# Patient Record
Sex: FEMALE | Race: WHITE | Employment: FULL TIME | URBAN - METROPOLITAN AREA
[De-identification: names, ages, dates, MRNs, and addresses within clinical notes are randomized per-mention and may not be internally consistent; named-entity substitution may affect disease eponyms.]

---

## 2017-02-21 ENCOUNTER — HOSPITAL ENCOUNTER (OUTPATIENT)
Dept: MAMMOGRAPHY | Facility: CLINIC | Age: 47
Discharge: HOME | End: 2017-02-21
Attending: FAMILY MEDICINE | Admitting: FAMILY MEDICINE
Payer: COMMERCIAL

## 2017-02-21 DIAGNOSIS — Z12.31 VISIT FOR SCREENING MAMMOGRAM: ICD-10-CM

## 2017-02-21 PROCEDURE — 77063 BREAST TOMOSYNTHESIS BI: CPT

## 2017-03-10 ENCOUNTER — RESULT FOLLOW UP (OUTPATIENT)
Dept: FAMILY MEDICINE | Facility: CLINIC | Age: 47
End: 2017-03-10

## 2017-03-10 ENCOUNTER — OFFICE VISIT (OUTPATIENT)
Dept: FAMILY MEDICINE | Facility: CLINIC | Age: 47
End: 2017-03-10
Payer: COMMERCIAL

## 2017-03-10 VITALS
TEMPERATURE: 97.8 F | OXYGEN SATURATION: 100 % | BODY MASS INDEX: 22.4 KG/M2 | WEIGHT: 139.4 LBS | DIASTOLIC BLOOD PRESSURE: 80 MMHG | SYSTOLIC BLOOD PRESSURE: 124 MMHG | HEART RATE: 83 BPM | HEIGHT: 66 IN

## 2017-03-10 DIAGNOSIS — Z12.4 SCREENING FOR MALIGNANT NEOPLASM OF CERVIX: Primary | ICD-10-CM

## 2017-03-10 DIAGNOSIS — Z15.89 MTHFR MUTATION: ICD-10-CM

## 2017-03-10 DIAGNOSIS — R87.610 PAPANICOLAOU SMEAR OF CERVIX WITH ATYPICAL SQUAMOUS CELLS OF UNDETERMINED SIGNIFICANCE (ASC-US): ICD-10-CM

## 2017-03-10 DIAGNOSIS — Z13.6 CARDIOVASCULAR SCREENING; LDL GOAL LESS THAN 130: ICD-10-CM

## 2017-03-10 DIAGNOSIS — Z00.00 ENCOUNTER FOR ROUTINE ADULT HEALTH EXAMINATION WITHOUT ABNORMAL FINDINGS: ICD-10-CM

## 2017-03-10 DIAGNOSIS — N39.46 MIXED INCONTINENCE URGE AND STRESS (MALE)(FEMALE): ICD-10-CM

## 2017-03-10 DIAGNOSIS — N81.4 CYSTOCELE WITH UTERINE DESCENSUS: ICD-10-CM

## 2017-03-10 DIAGNOSIS — R87.610 PAP SMEAR WITH ATYPICAL SQUAMOUS CELLS, CANNOT EXCLUDE HIGH GRADE SQUAMOUS INTRAEPITHELIAL LESION (ASC-H): ICD-10-CM

## 2017-03-10 DIAGNOSIS — Z23 NEED FOR PROPHYLACTIC VACCINATION AND INOCULATION AGAINST INFLUENZA: ICD-10-CM

## 2017-03-10 LAB
ERYTHROCYTE [DISTWIDTH] IN BLOOD BY AUTOMATED COUNT: 12.6 % (ref 10–15)
HCT VFR BLD AUTO: 42.6 % (ref 35–47)
HGB BLD-MCNC: 14.2 G/DL (ref 11.7–15.7)
MCH RBC QN AUTO: 29.5 PG (ref 26.5–33)
MCHC RBC AUTO-ENTMCNC: 33.3 G/DL (ref 31.5–36.5)
MCV RBC AUTO: 88 FL (ref 78–100)
PLATELET # BLD AUTO: 294 10E9/L (ref 150–450)
RBC # BLD AUTO: 4.82 10E12/L (ref 3.8–5.2)
WBC # BLD AUTO: 7 10E9/L (ref 4–11)

## 2017-03-10 PROCEDURE — G0145 SCR C/V CYTO,THINLAYER,RESCR: HCPCS | Performed by: FAMILY MEDICINE

## 2017-03-10 PROCEDURE — 84443 ASSAY THYROID STIM HORMONE: CPT | Performed by: FAMILY MEDICINE

## 2017-03-10 PROCEDURE — 80053 COMPREHEN METABOLIC PANEL: CPT | Performed by: FAMILY MEDICINE

## 2017-03-10 PROCEDURE — 87624 HPV HI-RISK TYP POOLED RSLT: CPT | Performed by: FAMILY MEDICINE

## 2017-03-10 PROCEDURE — 85027 COMPLETE CBC AUTOMATED: CPT | Performed by: FAMILY MEDICINE

## 2017-03-10 PROCEDURE — 80061 LIPID PANEL: CPT | Performed by: FAMILY MEDICINE

## 2017-03-10 PROCEDURE — 90471 IMMUNIZATION ADMIN: CPT | Performed by: FAMILY MEDICINE

## 2017-03-10 PROCEDURE — 90686 IIV4 VACC NO PRSV 0.5 ML IM: CPT | Performed by: FAMILY MEDICINE

## 2017-03-10 PROCEDURE — 99396 PREV VISIT EST AGE 40-64: CPT | Mod: 25 | Performed by: FAMILY MEDICINE

## 2017-03-10 PROCEDURE — 36415 COLL VENOUS BLD VENIPUNCTURE: CPT | Performed by: FAMILY MEDICINE

## 2017-03-10 PROCEDURE — G0124 SCREEN C/V THIN LAYER BY MD: HCPCS | Performed by: FAMILY MEDICINE

## 2017-03-10 ASSESSMENT — ANXIETY QUESTIONNAIRES
2. NOT BEING ABLE TO STOP OR CONTROL WORRYING: SEVERAL DAYS
6. BECOMING EASILY ANNOYED OR IRRITABLE: NOT AT ALL
3. WORRYING TOO MUCH ABOUT DIFFERENT THINGS: SEVERAL DAYS
GAD7 TOTAL SCORE: 7
7. FEELING AFRAID AS IF SOMETHING AWFUL MIGHT HAPPEN: SEVERAL DAYS
5. BEING SO RESTLESS THAT IT IS HARD TO SIT STILL: SEVERAL DAYS
IF YOU CHECKED OFF ANY PROBLEMS ON THIS QUESTIONNAIRE, HOW DIFFICULT HAVE THESE PROBLEMS MADE IT FOR YOU TO DO YOUR WORK, TAKE CARE OF THINGS AT HOME, OR GET ALONG WITH OTHER PEOPLE: NOT DIFFICULT AT ALL
1. FEELING NERVOUS, ANXIOUS, OR ON EDGE: MORE THAN HALF THE DAYS

## 2017-03-10 ASSESSMENT — PATIENT HEALTH QUESTIONNAIRE - PHQ9: 5. POOR APPETITE OR OVEREATING: SEVERAL DAYS

## 2017-03-10 NOTE — MR AVS SNAPSHOT
"              After Visit Summary   3/10/2017    Sherry Osorio    MRN: [id number]           Patient Information     Date Of Birth          1970        Visit Information        Provider Department      3/10/2017 4:15 PM Selena Barnes MD [facility] [place]        Today's Diagnoses     Screening for malignant neoplasm of cervix    -  1    Encounter for routine adult health examination without abnormal findings        CARDIOVASCULAR SCREENING; LDL GOAL LESS THAN 130        Papanicolaou smear of cervix with atypical squamous cells of undetermined significance (ASC-US)        Cystocele with uterine descensus- mild w/ both         MTHFR mutation - heterozygote        Need for prophylactic vaccination and inoculation against influenza        Mixed incontinence urge and stress (male)(female)          Care Instructions    STOP TANNING!!!  Discussed effects of UV rays on immune system, wrinkling, skin damage, skin cancer, etc.    Patient Instructions    Tips for avoiding skin cancer and premature skin aging:    Wear sunscreen on face every day even in winter. UVA penetrates window glass and is just as strong in the winter as it is in the summer. Sunscreen with SPF > 30 is recommended.    Avoid mid-day sunshine (10 AM to 3 PM) if possible.    Never use a tanning bed. They are associated with much higher risks of skin cancer. There is no merit in getting \"a base tan\" before a warm weather vacation. All tanning damages the skin.    Wear a wide brimmed hat and sunglasses.    Wear  sun protective clothing such as [employer] ( local company out of [facility]- they guarantee their UPF 50+sun protection for the life of the garment -  or sunprecaCelebrations.com ( [employer]) that is stylish, comfortable and cool. Try their web site for sales.    If you smoke, stop. Smokers have higher rates of skin cancer and also have premature wrinkling.    Note that spray sunscreens are only for re-application, not " for initial applications. Caution around kids who may breathe in the chemicals.    At the beach, it is recommended to use 2 ounces (one shot glass) of sunscreen and to reapply every 2 hours. This means an 8 ounce bottle or tube of sunscreen contains four applications.    Any tan indicates sun damage. Aim for ivory skin year round and you will have less trouble with your skin in years to come.      Chemical free sunscreens (better for sensitive skin) include  -Vanicream SPF 30 and 50+  - EltaMD tinted mineral sunscreen, SPF 41 (for face)  - Blue Lizard  -Neutrogena Pure and Free       Preventive Health Recommendations  Female Ages 40 to 49    Yearly exam:     See your health care provider every year in order to  1. Review health changes.   2. Discuss preventive care.    3. Review your medicines if your doctor prescribed any.      Get a Pap test every three years (unless you have an abnormal result and your provider advises testing more often).      If you get Pap tests with HPV test, you only need to test every 5 years, unless you have an abnormal result. You do not need a Pap test if your uterus was removed (hysterectomy) and you have not had cancer.      You should be tested each year for STDs (sexually transmitted diseases), if you're at risk.       Ask your doctor if you should have a mammogram.      Have a colonoscopy (test for colon cancer) if someone in your family has had colon cancer or polyps before age 50.       Have a cholesterol test every 5 years.       Have a diabetes test (fasting glucose) after age 45. If you are at risk for diabetes, you should have this test every 3 years.    Shots: Get a flu shot each year. Get a tetanus shot every 10 years.     Nutrition:     Eat at least 5 servings of fruits and vegetables each day.    Eat whole-grain bread, whole-wheat pasta and brown rice instead of white grains and rice.    Talk to your provider about Calcium and Vitamin D.     Lifestyle    Exercise at least  150 minutes a week (an average of 30 minutes a day, 5 days a week). This will help you control your weight and prevent disease.    Limit alcohol to one drink per day.    No smoking.     Wear sunscreen to prevent skin cancer.    See your dentist every six months for an exam and cleaning.            Follow-ups after your visit        Additional Services     UROLOGY ADULT REFERRAL       Your provider has referred you to: [facility]: [facility] [phone]   [email or web address]    Please be aware that coverage of these services is subject to the terms and limitations of your health insurance plan.  Call member services at your health plan with any benefit or coverage questions.      Please bring the following with you to your appointment:    (1) Any X-Rays, CTs or MRIs which have been performed.  Contact the facility where they were done to arrange for  prior to your scheduled appointment.  Any new CT, MRI or other procedures ordered by your specialist must be performed at a [facility] facility or coordinated by your clinic's referral office.  (2) List of current medications  (3) This referral request   (4) Any documents/labs given to you for this referral                  Who to contact     If you have questions or need follow up information about today's clinic visit or your schedule please contact [facility] [place] directly at [phone].  Normal or non-critical lab and imaging results will be communicated to you by MyChart, letter or phone within 4 business days after the clinic has received the results. If you do not hear from us within 7 days, please contact the clinic through MyChart or phone. If you have a critical or abnormal lab result, we will notify you by phone as soon as possible.  Submit refill requests through [facility] or call your pharmacy and they will forward the refill request to us. Please allow 3 business days for your  "refill to be completed.          Additional Information About Your Visit        [email or web address] Information     [employer] gives you secure access to your electronic health record. If you see a primary care provider, you can also send messages to your care team and make appointments. If you have questions, please call your primary care clinic.  If you do not have a primary care provider, please call [phone] and they will assist you.        Care EveryWhere ID     This is your Care EveryWhere ID. This could be used by other organizations to access your [facility] medical records  [id number]        Your Vitals Were     Pulse Temperature Height Last Period Pulse Oximetry BMI (Body Mass Index)    83 97.8  F (36.6  C) (Oral) 5' 5.5\" (1.664 m) 04/01/2015 100% 22.84 kg/m2       Blood Pressure from Last 3 Encounters:   03/10/17 124/80   02/11/16 121/84   01/25/16 121/70    Weight from Last 3 Encounters:   03/10/17 139 lb 6.4 oz (63.2 kg)   02/11/16 159 lb (72.1 kg)   01/25/16 159 lb 12.8 oz (72.5 kg)              We Performed the Following     CBC with platelets     Comprehensive metabolic panel     FLU VAC, SPLIT VIRUS IM > 3 YO (QUADRIVALENT) [00623]     HPV High Risk Types DNA Cervical     Lipid panel reflex to direct LDL     Pap imaged thin layer screen with HPV - recommended age 30 - 65 years (select HPV order below)     TSH with free T4 reflex     UROLOGY ADULT REFERRAL     Vaccine Administration, Initial [[id number]]        Primary Care Provider Office Phone # Fax #    Selena Barnes [phone]       [address] [address]        Thank you!     Thank you for choosing [facility]  for your care. Our goal is always to provide you with excellent care. Hearing back from our patients is one way we can continue to improve our services. Please take a few minutes to complete the written survey that you may receive in the mail after your visit with " us. Thank you!             Your Updated Medication List - Protect others around you: Learn how to safely use, store and throw away your medicines at www.disposemymeds.org.          This list is accurate as of: 3/10/17  5:58 PM.  Always use your most recent med list.                   Brand Name Dispense Instructions for use    aspirin 81 MG tablet     30 tablet    Take 1 tablet (81 mg) by mouth daily       triamcinolone 0.1 % cream    KENALOG    80 g    Apply topically 3 times daily as needed Apply sparingly to affected area.

## 2017-03-10 NOTE — NURSING NOTE
"Chief Complaint   Patient presents with     Physical       Initial /80 (BP Location: Right arm, Patient Position: Chair, Cuff Size: Adult Regular)  Pulse 83  Temp 97.8  F (36.6  C) (Oral)  Ht 5' 5.5\" (1.664 m)  Wt 139 lb 6.4 oz (63.2 kg)  LMP 04/01/2015  SpO2 100%  BMI 22.84 kg/m2 Estimated body mass index is 22.84 kg/(m^2) as calculated from the following:    Height as of this encounter: 5' 5.5\" (1.664 m).    Weight as of this encounter: 139 lb 6.4 oz (63.2 kg).  Medication Reconciliation: complete   Adriane Rivera CMA  "

## 2017-03-10 NOTE — LETTER
November 28, 2018      Sherry [address]in[address]a  [address] [place] [address]    Dear ,      At [facility], your health and wellness is our primary concern. That is why we are following up on a colposcopy from 12/13/17, which was reported as NEO 1. Your provider had recommended that you have a Pap smear and HPV test completed by 12/13/18. Our records do not show that this has been scheduled.    It is important to complete the follow up that your provider has suggested for you to ensure that there are no worsening changes which may, over time, develop into cancer.      Please contact our office at  [phone] to schedule an appointment for a Pap smear and HPV test at your earliest convenience. If you have questions or concerns, please call the clinic and we will be happy to assist you.    If you have completed the tests outside of [facility], please have the results forwarded to our office. We will update the chart for your primary Physician to review before your next annual physical.     Thank you for choosing [facility]!    Sincerely,      Selena Barnes MD/[facility]

## 2017-03-10 NOTE — LETTER
October 5, 2017      Sherry [address]iz [address]  [address] [place] [address]    Dear ,      At [facility], your health and wellness is our primary concern. That is why we are following up on a colposcopy from 4/21/17, which was reported as NEO 1. Your provider had recommended that you have a Pap smear completed by 10/21/17. Our records do not show that this has been done.      It is important to complete the follow up that your provider has suggested for you to ensure that there are no worsening changes which may, over time, develop into cancer.      Please contact our office at  [phone] to schedule an appointment for a Pap smear at your earliest convenience. If you have questions or concerns, please call the clinic and we will be happy to assist you.    If you have completed the tests outside of [facility], please have the results forwarded to our office. We will update the chart for your primary Physician to review before your next annual physical.     Thank you for choosing [facility]!    Sincerely,      Selena Barnes MD/mj

## 2017-03-10 NOTE — PROGRESS NOTES
SUBJECTIVE:     CC: Sherry Osorio is an 46 year old woman who presents for preventive health visit.     Healthy Habits:    Do you get at least three servings of calcium containing foods daily (dairy, green leafy vegetables, etc.)? yes    Amount of exercise or daily activities, outside of work: 2-3 day(s) per week, 45-60 minutes    Problems taking medications regularly No    Medication side effects: No    Have you had an eye exam in the past two years? yes    Do you see a dentist twice per year? yes    Do you have sleep apnea, excessive snoring or daytime drowsiness?no            Today's PHQ-2 Score:   PHQ-2 ( 1999 Pfizer) 3/10/2017 1/24/2016   Q1: Little interest or pleasure in doing things 1 -   Q2: Feeling down, depressed or hopeless 1 -   PHQ-2 Score 2 -   Little interest or pleasure in doing things Several days Not at all   Feeling down, depressed or hopeless Several days Not at all   PHQ-2 Score 2 0       Abuse: Current or Past(Physical, Sexual or Emotional)- No  Do you feel safe in your environment - Yes    Social History   Substance Use Topics     Smoking status: Never Smoker     Smokeless tobacco: Never Used     Alcohol use 0.0 - 1.0 oz/week      Comment: maybe 0-1 weekly     The patient does not drink >3 drinks per day nor >7 drinks per week.    Recent Labs   Lab Test  01/25/16   1508  12/22/14   0953  12/18/13   1137   CHOL  192  193  200   HDL  65  63  60   LDL  117*  122  128   TRIG  51  41  61   CHOLHDLRATIO   --   3.1  3.3   NHDL  127   --    --        Reviewed orders with patient.  Reviewed health maintenance and updated orders accordingly - Yes    Mammo Decision Support:  Patient under age 50, mammogram was done on 02/21/2017.      Pertinent mammograms are reviewed under the imaging tab.  History of abnormal Pap smear: NO - age 30- 65 PAP every 3 years recommended    Reviewed and updated as needed this visit by clinical staff  Tobacco  Allergies  Meds  Med Hx  Surg Hx  Fam Hx  Soc Hx         Reviewed and updated as needed this visit by Provider        Health Maintenance   Topic Date Due     INFLUENZA VACCINE (SYSTEM ASSIGNED)  09/01/2017     MAMMO Q1 YR INBASKET MESSAGE  02/21/2018     LIPID MONITORING Q1 YEAR( NO INBASKET)  03/10/2018     PAP Q1 YR  03/10/2018     CAROL QUESTIONNAIRE 1 YEAR  03/10/2018     WELLNESS VISIT Q1 YR (NO INBASKET)  03/10/2018     PHQ-9 Q1YR (NO INBASKET)  03/10/2018     TETANUS IMMUNIZATION (SYSTEM ASSIGNED)  08/30/2022       Patient Active Problem List   Diagnosis     CARDIOVASCULAR SCREENING; LDL GOAL LESS THAN 130     Advanced directives, counseling/discussion     Multiple lipomas     Sebaceous cyst- left upper forehead      Syncope and collapse     Raynaud's phenomenon - noted 10/17/2011 at first visit      Cystocele with uterine descensus- mild w/ both      Cervical pain     Family history of clotting disorder- sister with MTHFR     Dermatitis- every winter - bilateral flanks and hips     MTHFR mutation - heterozygote     Headache     Chronic neck pain     Papanicolaou smear of cervix with atypical squamous cells of undetermined significance (ASC-US)     DDD (degenerative disc disease), cervical     DDD (degenerative disc disease), thoracic     Cervical spinal stenosis     Chronic constipation       Past Medical History   Diagnosis Date     ASCUS favor benign 2011, 2014     neg HPV Plan cotest in 3 yrs.     Cellulitis of right leg w/ abrasion 9/4/2012     Cervicalgia      Left ankle injury 9/4/2012     MTHFR mutation (H)      heterozygote - referred to hematology     Puncture wound of left lower leg with complication- cellulitis 9/4/2012     Vertigo summer 2006     rehydrated over night in hosp = better        Past Surgical History   Procedure Laterality Date     Powersite teeth extraction       Excise mass head  12/11/2012     Procedure: EXCISE MASS HEAD;  Excision Left Forehead Mass, Excision Right Posterior Thigh Mass, Excision Left Posterior Thigh Mass ;  Surgeon:  Felix Trivedi MD;  Location: [facility] OR     Excise mass lower extremity  2012     Procedure: EXCISE MASS LOWER EXTREMITY;;  Surgeon: Felix Trivedi MD;  Location: [facility] OR     Colonoscopy  2016     Dr. 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 No      Service No     Blood Transfusions No     Caffeine Concern Yes     1 can of pop daily      Exercise Yes     not regular - walks occasionally      Seat Belt Yes     always      Self-Exams Yes     SBE encouraged monthly      Social History Narrative       Family History   Problem Relation Age of Onset     Asthma Father      Hypertension Father      CEREBROVASCULAR DISEASE Father      Prostate Cancer Father      Neurologic Disorder Father      Lewy Body dementia - broke hip-  at age 76      CEREBROVASCULAR DISEASE Sister 47     Thyroid Disease Sister 68     hypothyroid      Blood Disease Sister 68     MTHFR deficiency - not factor V leiden      CEREBROVASCULAR DISEASE Sister 15     secondary to MTHFR deficiency      CANCER Sister 30     Breast cancer dx'd 2012     Breast Cancer Sister      Blood Disease Sister      CANCER Sister      Depression Sister      Thyroid Disease Sister      Colon Cancer No family hx of   "    Current Outpatient Prescriptions   Medication Sig Dispense Refill     aspirin 81 MG tablet Take 1 tablet (81 mg) by mouth daily 30 tablet      triamcinolone (KENALOG) 0.1 % cream Apply topically 3 times daily as needed Apply sparingly to affected area. 80 g 5      No Known Allergies     ROS:  C: NEGATIVE for fever, chills, change in weight  I: NEGATIVE for worrisome rashes, moles or lesions  E: NEGATIVE for vision changes or irritation  ENT: NEGATIVE for ear, mouth and throat problems  R: NEGATIVE for significant cough or SOB  B: NEGATIVE for masses, tenderness or discharge  CV: NEGATIVE for chest pain, palpitations or peripheral edema  GI: NEGATIVE for nausea, abdominal pain, heartburn, or change in bowel habits  : NEGATIVE for unusual urinary or vaginal symptoms. Periods are regular.  M: NEGATIVE for significant arthralgias or myalgia  N: NEGATIVE for weakness, dizziness or paresthesias  P: NEGATIVE for changes in mood or affect    Problem list, Medication list, Allergies, and Medical/Social/Surgical histories reviewed in [facility] and updated as appropriate.  Labs reviewed in EPIC  BP Readings from Last 3 Encounters:   03/10/17 124/80   02/11/16 121/84   01/25/16 121/70    Wt Readings from Last 3 Encounters:   03/10/17 139 lb 6.4 oz (63.2 kg)   02/11/16 159 lb (72.1 kg)   01/25/16 159 lb 12.8 oz (72.5 kg)                  OBJECTIVE:     /80 (BP Location: Right arm, Patient Position: Chair, Cuff Size: Adult Regular)  Pulse 83  Temp 97.8  F (36.6  C) (Oral)  Ht 5' 5.5\" (1.664 m)  Wt 139 lb 6.4 oz (63.2 kg)  [facility] 04/01/2015  SpO2 100%  BMI 22.84 kg/m2  EXAM:  GENERAL: healthy, alert and no distress  EYES: Eyes grossly normal to inspection, PERRL and conjunctivae and sclerae normal  HENT: ear canals and TM's normal, nose and mouth without ulcers or lesions  NECK: no adenopathy, no asymmetry, masses, or scars and thyroid normal to palpation  RESP: lungs clear to auscultation - no rales, rhonchi or " wheezes  BREAST: normal without masses, tenderness or nipple discharge and no palpable axillary masses or adenopathy  CV: regular rate and rhythm, normal S1 S2, no S3 or S4, no murmur, click or rub, no peripheral edema and peripheral pulses strong  ABDOMEN: soft, nontender, no hepatosplenomegaly, no masses and bowel sounds normal   (female): normal female external genitalia, normal urethral meatus, vaginal mucosa pink, moist, well rugated, and normal cervix/adnexa/uterus without masses or discharge  MS: no gross musculoskeletal defects noted, no edema  SKIN: no suspicious lesions or rashes  NEURO: Normal strength and tone, mentation intact and speech normal  PSYCH: mentation appears normal, affect normal/bright    ASSESSMENT/PLAN:         ICD-10-CM    1. Screening for malignant neoplasm of cervix Z12.4 Pap imaged thin layer screen with HPV - recommended age 30 - 65 years (select HPV order below)     HPV High Risk Types DNA Cervical   2. Encounter for routine adult health examination without abnormal findings Z00.00 CBC with platelets     TSH with free T4 reflex     Comprehensive metabolic panel   3. CARDIOVASCULAR SCREENING; LDL GOAL LESS THAN 130 Z13.6 Lipid panel reflex to direct LDL     CBC with platelets     TSH with free T4 reflex     Comprehensive metabolic panel   4. Papanicolaou smear of cervix with atypical squamous cells of undetermined significance (ASC-US) R87.610 Pap imaged thin layer screen with HPV - recommended age 30 - 65 years (select HPV order below)     HPV High Risk Types DNA Cervical   5. Cystocele with uterine descensus- mild w/ both  N81.4    6. MTHFR mutation - heterozygote E72.12    7. Need for prophylactic vaccination and inoculation against influenza Z23 Vaccine Administration, Initial [02493]     FLU VAC, SPLIT VIRUS IM > 3 YO (QUADRIVALENT) [66569]   8. Mixed incontinence urge and stress (male)(female) N39.46 UROLOGY ADULT REFERRAL       COUNSELING:   Reviewed preventive health  "counseling, as reflected in patient instructions  BP Readings from Last 3 Encounters:   03/10/17 124/80   02/11/16 121/84   01/25/16 121/70       BP Screening:   Last 3 BP Readings:    BP Readings from Last 3 Encounters:   03/10/17 124/80   02/11/16 121/84   01/25/16 121/70       The following was recommended to the patient:  Re-screen BP within a year and recommended lifestyle modifications     reports that she has never smoked. She has never used smokeless tobacco.    Estimated body mass index is 22.84 kg/(m^2) as calculated from the following:    Height as of this encounter: 5' 5.5\" (1.664 m).    Weight as of this encounter: 139 lb 6.4 oz (63.2 kg).        Getting some mixed stress and urge urinary incontinence. Has had 2 episodes of uncontrollable nocturia from which she did not awaken until she had soaked her bedclothes.  Desires referral to Urology at this time. Doesn't want to do medication quite yet.no dysuria, frequency or urgency.      Counseling Resources:  ATP IV Guidelines  Pooled Cohorts Equation Calculator  Breast Cancer Risk Calculator  FRAX Risk Assessment  ICSI Preventive Guidelines  Dietary Guidelines for Americans, 2010  USDA's MyPlate  ASA Prophylaxis  Lung CA Screening    Selena Barnes MD  [facility] [place]  "

## 2017-03-10 NOTE — PROGRESS NOTES
Injectable Influenza Immunization Documentation    1.  Is the person to be vaccinated sick today?  No    2. Does the person to be vaccinated have an allergy to eggs or to a component of the vaccine?  No    3. Has the person to be vaccinated today ever had a serious reaction to influenza vaccine in the past?  No    4. Has the person to be vaccinated ever had Guillain-[place] syndrome?  No     Form completed by Pt

## 2017-03-10 NOTE — PATIENT INSTRUCTIONS
"STOP TANNING!!!  Discussed effects of UV rays on immune system, wrinkling, skin damage, skin cancer, etc.    Patient Instructions    Tips for avoiding skin cancer and premature skin aging:    Wear sunscreen on face every day even in winter. UVA penetrates window glass and is just as strong in the winter as it is in the summer. Sunscreen with SPF > 30 is recommended.    Avoid mid-day sunshine (10 AM to 3 PM) if possible.    Never use a tanning bed. They are associated with much higher risks of skin cancer. There is no merit in getting \"a base tan\" before a warm weather vacation. All tanning damages the skin.    Wear a wide brimmed hat and sunglasses.    Wear  sun protective clothing such as Cool[employer]ar.com ( local company out of [place]- they guarantee their UPF 50+sun protection for the life of the garment -  or sunpreShark Punch ( [employer] that is stylish, comfortable and cool. Try their web site for sales.    If you smoke, stop. Smokers have higher rates of skin cancer and also have premature wrinkling.    Note that spray sunscreens are only for re-application, not for initial applications. Caution around kids who may breathe in the chemicals.    At the beach, it is recommended to use 2 ounces (one shot glass) of sunscreen and to reapply every 2 hours. This means an 8 ounce bottle or tube of sunscreen contains four applications.    Any tan indicates sun damage. Aim for ivory skin year round and you will have less trouble with your skin in years to come.      Chemical free sunscreens (better for sensitive skin) include  -Vanicream SPF 30 and 50+  - EltaMD tinted mineral sunscreen, SPF 41 (for face)  - Blue Lizard  -Neutrogena Pure and Free       Preventive Health Recommendations  Female Ages 40 to 49    Yearly exam:     See your health care provider every year in order to  1. Review health changes.   2. Discuss preventive care.    3. Review your medicines if your doctor prescribed any.      Get a Pap " test every three years (unless you have an abnormal result and your provider advises testing more often).      If you get Pap tests with HPV test, you only need to test every 5 years, unless you have an abnormal result. You do not need a Pap test if your uterus was removed (hysterectomy) and you have not had cancer.      You should be tested each year for STDs (sexually transmitted diseases), if you're at risk.       Ask your doctor if you should have a mammogram.      Have a colonoscopy (test for colon cancer) if someone in your family has had colon cancer or polyps before age 50.       Have a cholesterol test every 5 years.       Have a diabetes test (fasting glucose) after age 45. If you are at risk for diabetes, you should have this test every 3 years.    Shots: Get a flu shot each year. Get a tetanus shot every 10 years.     Nutrition:     Eat at least 5 servings of fruits and vegetables each day.    Eat whole-grain bread, whole-wheat pasta and brown rice instead of white grains and rice.    Talk to your provider about Calcium and Vitamin D.     Lifestyle    Exercise at least 150 minutes a week (an average of 30 minutes a day, 5 days a week). This will help you control your weight and prevent disease.    Limit alcohol to one drink per day.    No smoking.     Wear sunscreen to prevent skin cancer.    See your dentist every six months for an exam and cleaning.

## 2017-03-11 LAB
ALBUMIN SERPL-MCNC: 4.1 G/DL (ref 3.4–5)
ALP SERPL-CCNC: 103 U/L (ref 40–150)
ALT SERPL W P-5'-P-CCNC: 23 U/L (ref 0–50)
ANION GAP SERPL CALCULATED.3IONS-SCNC: 8 MMOL/L (ref 3–14)
AST SERPL W P-5'-P-CCNC: 17 U/L (ref 0–45)
BILIRUB SERPL-MCNC: 0.5 MG/DL (ref 0.2–1.3)
BUN SERPL-MCNC: 9 MG/DL (ref 7–30)
CALCIUM SERPL-MCNC: 9.3 MG/DL (ref 8.5–10.1)
CHLORIDE SERPL-SCNC: 105 MMOL/L (ref 94–109)
CHOLEST SERPL-MCNC: 203 MG/DL
CO2 SERPL-SCNC: 28 MMOL/L (ref 20–32)
CREAT SERPL-MCNC: 0.73 MG/DL (ref 0.52–1.04)
GFR SERPL CREATININE-BSD FRML MDRD: 86 ML/MIN/1.7M2
GLUCOSE SERPL-MCNC: 86 MG/DL (ref 70–99)
HDLC SERPL-MCNC: 64 MG/DL
LDLC SERPL CALC-MCNC: 130 MG/DL
NONHDLC SERPL-MCNC: 139 MG/DL
POTASSIUM SERPL-SCNC: 4.5 MMOL/L (ref 3.4–5.3)
PROT SERPL-MCNC: 7.4 G/DL (ref 6.8–8.8)
SODIUM SERPL-SCNC: 141 MMOL/L (ref 133–144)
TRIGL SERPL-MCNC: 47 MG/DL
TSH SERPL DL<=0.005 MIU/L-ACNC: 0.59 MU/L (ref 0.4–4)

## 2017-03-11 ASSESSMENT — PATIENT HEALTH QUESTIONNAIRE - PHQ9: SUM OF ALL RESPONSES TO PHQ QUESTIONS 1-9: 7

## 2017-03-11 ASSESSMENT — ANXIETY QUESTIONNAIRES: GAD7 TOTAL SCORE: 7

## 2017-03-16 LAB
COPATH REPORT: ABNORMAL
PAP: ABNORMAL

## 2017-03-17 LAB
FINAL DIAGNOSIS: NORMAL
HPV HR 12 DNA CVX QL NAA+PROBE: NEGATIVE
HPV16 DNA SPEC QL NAA+PROBE: NEGATIVE
HPV18 DNA SPEC QL NAA+PROBE: NEGATIVE
SPECIMEN DESCRIPTION: NORMAL

## 2017-03-20 ENCOUNTER — TELEPHONE (OUTPATIENT)
Dept: FAMILY MEDICINE | Facility: CLINIC | Age: 47
End: 2017-03-20

## 2017-03-20 NOTE — TELEPHONE ENCOUNTER
Reason for Call:  Appointment     Detailed comments: Pt wondering how far out it is okay to book for the colposcopy. Dr. Barnes is booked out until mid April. Is it okay to wait this long or can we fit them in sooner?     Phone Number Patient can be reached at: Cell number on file:    Telephone Information:   Mobile [phone]       Best Time: anytime    Can we leave a detailed message on this number? YES    Call taken on 3/20/2017 at 11:20 AM by Cara Bar

## 2017-03-20 NOTE — PROGRESS NOTES
2011, 2014: Ascus pap,neg HPV Plan cotest in 3 yrs.  1/2016 Pap NIL, cotest one year.  03/10/17: ASC-H pap, Neg HR HPV result. Plan Head Waters due by 06/10/17.  03/20/17: Pt was informed of results and recommendation for a Head Waters. I also sent the pt the info via Kines[employer] Capture per request.  4/21/17: Head Waters-NEO 1. Plan: diagnostic Pap in 6 mo.(dbe)  10/5/17 My Chart pap reminder message sent (rlm)  11/10/17 ASC-H pap, neg HR HPV. Plan to refer back to ObGyn. (LN)  11/27/17: Pt was advised. (sk)  03/23/18 MyChart conversation with RN. ([facility])  03/26/18 RN called ObGyn to request records. ([facility])  12/13/17: Head Waters Bx NEO I, ECC Neg for dysplasia. Plan cotest in 1 year or LEEP if the pt desires this. Cotest due by 12/13/18. (abstracted records).  04/25/18: I'm not seeing that a LEEP was done. Message sent to the pt K[employer] Capture to see if she just decided to cotest in 1 year.  07/18/18: Pt responded via Kines[employer] Capture and said that she decided to cotest in 1 year. Cotest due by 12/13/18.  11/28/18 Cotest reminder letter sent. ([facility])  12/20/18 Spoke with pt, thanked us for the reminder. ([facility])  01/03/19 Patient is lost to pap tracking follow-up. FYI routed to provider. ([facility])

## 2017-04-21 ENCOUNTER — OFFICE VISIT (OUTPATIENT)
Dept: FAMILY MEDICINE | Facility: CLINIC | Age: 47
End: 2017-04-21
Payer: COMMERCIAL

## 2017-04-21 VITALS
OXYGEN SATURATION: 100 % | TEMPERATURE: 98.3 F | HEART RATE: 95 BPM | DIASTOLIC BLOOD PRESSURE: 72 MMHG | BODY MASS INDEX: 22.53 KG/M2 | SYSTOLIC BLOOD PRESSURE: 108 MMHG | HEIGHT: 66 IN | WEIGHT: 140.2 LBS

## 2017-04-21 DIAGNOSIS — R87.610 PAP SMEAR WITH ATYPICAL SQUAMOUS CELLS, CANNOT EXCLUDE HIGH GRADE SQUAMOUS INTRAEPITHELIAL LESION (ASC-H): Primary | ICD-10-CM

## 2017-04-21 LAB — BETA HCG QUAL IFA URINE: NEGATIVE

## 2017-04-21 PROCEDURE — 84703 CHORIONIC GONADOTROPIN ASSAY: CPT | Performed by: FAMILY MEDICINE

## 2017-04-21 PROCEDURE — 57454 BX/CURETT OF CERVIX W/SCOPE: CPT | Performed by: FAMILY MEDICINE

## 2017-04-21 PROCEDURE — 88305 TISSUE EXAM BY PATHOLOGIST: CPT | Performed by: FAMILY MEDICINE

## 2017-04-21 NOTE — PROGRESS NOTES
SUBJECTIVE:                                                    Sherry Osorio is a 46 year old female who presents to clinic today for the following health issues:  for  colposcopy, referred by me . Pap smear 1 months ago showed: ASCUS cannot rule out high grade cellular changes. The prior pap showed Normal.   No LMP recorded. Patient is not currently having periods (Reason: Amenorrhea)..  urine pregnancy test results today: negative.   Allergies:  No Known Allergies    Prior cervical/vaginal disease: Normal exam without visible pathology.  Prior cervical treatment: no treatment.    Procedure for colposcopy and biopsy has been explained to the   Patient in detail. Before the procedure, it was ensured that the patient was educated regarding the nature of her findings to date, the implications of them, and what was to be done. She has been made aware of the role of HPV, the natural history of infection, ways to minimize her future risk, the effect of HPV on the cervix, and treatment options available should they be indicated.     The details of the colposcopic procedure were reviewed, as well as the risks of missed diagnoses, pain, infection and bleeding. All questions were answered before proceeding, and informed consent was therefore obtained.  Risks, benefits and alternative explained. All pt's questions asked and answered.  Full  written informed consent, including risks of bleeding, infection, and permanent scarring, was obtained and sent to scan into Epic.      Pause for the cause with identification of the patient's full name, birthdate, and reason and location of the procedure was done and confirmed today with me, Selena Barnes MD and Ashley Mccormick LPN,  as a witness.     HPV and the ways it can affect the genitals and cervix were explained to pt as well today.     PROCEDURE:  Speculum placed in vagina and excellent visualization of cervix   acheived, cervix swabbed x 3 with acetic acid  solution.    FINDINGS:  Cervix: no mosaicism, no punctation, no abnormal vasculature,  Superficial warty/HPV changes noted at 9 o'clock; cervix swabbed with Lugol's solution, endocervical speculum placed, superficial endocervical lesions noted at 12 and 6 o'clock, and hurricaine gel applied to endo and ectocervix.   endocervical curettage performed, cervical biopsy  taken at 9 o'clock, specimens labelled and sent to pathology, hemostasis achieved with Monsel's solution   Vaginal inspection: normal without visible lesions.    Vulvar colposcopy: normal mucosa without lesions.    Procedure Summary: Patient tolerated procedure well and colposcopy adequate.    ASSESSMENT/PLAN:                                                      ASSESSMENT: mild  cervical dysplasia    ICD-10-CM    1. Pap smear with atypical squamous cells, cannot exclude high grade squamous intraepithelial lesion (ASC-H) R87.611 Beta HCG qual IFA urine     Surgical pathology exam     COLP CERVIX/UPPER VAGINA W BX CERVIX/ENDOCERV CURETT        PLAN: Specimens labelled and sent to pathology., Will base further treatment on pathology findings., treatment options discussed with patient, post biopsy instructions given to patient and call to discuss Pathology results. see scanned colposcopy written sheet as well. Please, call or return to clinic or go to the ER immediately if signs or symptoms worsen or fail to improve as anticipated.          Selena Barnes MD    [facility]

## 2017-04-21 NOTE — PATIENT INSTRUCTIONS
Please, call or return to clinic or go to the ER immediately if signs or symptoms worsen or fail to improve as anticipated.     Please call if:   any bleeding heavier than a heavy period.                          any temperature > 100.5 degrees Fahrenheit   Any severe abdominal/pelvic cramping - more than a bad period   Any dizziness or feeling like you might faint that doesn't get better after eating/drinking     Use a light coating of vaseline to help protect your vulva from the Monsel's solution passing.     Nothing in the vagina ( no fingers, tampons, or sexual activity at all) for 7 days.     No tub baths or hot tubs x 7 days.       Please, call or return to clinic or go to the ER immediately if signs or symptoms worsen or fail to improve as anticipated.     Thank you for choosing [facility] for your Health Care. It is our pleasure and privilege to help care for you.  Please contact us with any questions or concerns you may have.       Sincerely,              Selena Barnes MD                                                                    To reach your [facility] care team after hours call:   [phone]     Our clinic hours are:   Monday- 7:30 am - 7:00 pm                          Tuesday through Friday- 7:30 am - 5:00 pm                                      Saturday- 8:00 am - 12:00 pm              Phone:  [phone]     Our pharmacy hours are:     Monday - Friday- 8:00 am to 7:00 pm                     Sat & Sun - 9:00 am to 1:00 pm         Phone:  [phone]       There is also information available at our web site:  [email or web address]     If your provider ordered any lab tests and you do not receive the results within 10 business days, please call the clinic.     If you need a medication refill please contact your pharmacy.  Please allow 2 business days for your refill to be completed.     Our clinic offers telephone visits and e visits.  Please ask one  of your team members to explain more.       Use Moxiehart (secure email communication and access to your chart) to send your primary care provider a message or make an appointment. Ask someone on your Team how to sign up for [employer]ie[employer].

## 2017-04-21 NOTE — MR AVS SNAPSHOT
After Visit Summary   4/21/2017    Sherry Osorio    MRN: [id number]           Patient Information     Date Of Birth          1970        Visit Information        Provider Department      4/21/2017 9:40 AM Selena Barnes MD; RV PROC RM 1 [facility]        Today's Diagnoses     Pap smear with atypical squamous cells, cannot exclude high grade squamous intraepithelial lesion (ASC-H)    -  1      Care Instructions    Please, call or return to clinic or go to the ER immediately if signs or symptoms worsen or fail to improve as anticipated.     Please call if:   any bleeding heavier than a heavy period.                          any temperature > 100.5 degrees Fahrenheit   Any severe abdominal/pelvic cramping - more than a bad period   Any dizziness or feeling like you might faint that doesn't get better after eating/drinking     Use a light coating of vaseline to help protect your vulva from the Monsel's solution passing.     Nothing in the vagina ( no fingers, tampons, or sexual activity at all) for 7 days.     No tub baths or hot tubs x 7 days.       Please, call or return to clinic or go to the ER immediately if signs or symptoms worsen or fail to improve as anticipated.     Thank you for choosing [facility] for your Health Care. It is our pleasure and privilege to help care for you.  Please contact us with any questions or concerns you may have.       Sincerely,              Selena Barnes MD                                                                    To reach your [facility] care team after hours call:   [phone]     Our clinic hours are:   Monday- 7:30 am - 7:00 pm                          Tuesday through Friday- 7:30 am - 5:00 pm                                      Saturday- 8:00 am - 12:00 pm              Phone:  [phone]     Our pharmacy hours are:     Monday - Friday- 8:00 am to 7:00 pm                      Sat & Sun - 9:00 am to 1:00 pm         Phone:  [phone]       There is also information available at our web site:  [email or web address]     If your provider ordered any lab tests and you do not receive the results within 10 business days, please call the clinic.     If you need a medication refill please contact your pharmacy.  Please allow 2 business days for your refill to be completed.     Our clinic offers telephone visits and e visits.  Please ask one of your team members to explain more.       Use TravelSharkt (secure email communication and access to your chart) to send your primary care provider a message or make an appointment. Ask someone on your Team how to sign up for [employer].           Follow-ups after your visit        Who to contact     If you have questions or need follow up information about today's clinic visit or your schedule please contact [facility] directly at [phone].  Normal or non-critical lab and imaging results will be communicated to you by MyChart, letter or phone within 4 business days after the clinic has received the results. If you do not hear from us within 7 days, please contact the clinic through Woldmehart or phone. If you have a critical or abnormal lab result, we will notify you by phone as soon as possible.  Submit refill requests through [employer] or call your pharmacy and they will forward the refill request to us. Please allow 3 business days for your refill to be completed.          Additional Information About Your Visit        MyChart Information     TravelSharkt gives you secure access to your electronic health record. If you see a primary care provider, you can also send messages to your care team and make appointments. If you have questions, please call your primary care clinic.  If you do not have a primary care provider, please call [phone] and they will assist you.        Care EveryWhere ID     This is your Care EveryWhere ID. This could be used  "by other organizations to access your [place] medical records  [id number]        Your Vitals Were     Pulse Temperature Height Pulse Oximetry BMI (Body Mass Index)       95 98.3  F (36.8  C) (Oral) 5' 5.5\" (1.664 m) 100% 22.98 kg/m2        Blood Pressure from Last 3 Encounters:   04/21/17 108/72   03/10/17 124/80   02/11/16 121/84    Weight from Last 3 Encounters:   04/21/17 140 lb 3.2 oz (63.6 kg)   03/10/17 139 lb 6.4 oz (63.2 kg)   02/11/16 159 lb (72.1 kg)              We Performed the Following     Beta HCG qual IFA urine        Primary Care Provider Office Phone # Fax #    Selena Barnes [phone]       [facility] [address] [place] [id number]        Thank you!     Thank you for choosing [facility]  for your care. Our goal is always to provide you with excellent care. Hearing back from our patients is one way we can continue to improve our services. Please take a few minutes to complete the written survey that you may receive in the mail after your visit with us. Thank you!             Your Updated Medication List - Protect others around you: Learn how to safely use, store and throw away your medicines at www.disposemymeds.org.          This list is accurate as of: 4/21/17 11:10 AM.  Always use your most recent med list.                   Brand Name Dispense Instructions for use    aspirin 81 MG tablet     30 tablet    Take 1 tablet (81 mg) by mouth daily       triamcinolone 0.1 % cream    KENALOG    80 g    Apply topically 3 times daily as needed Apply sparingly to affected area.         "

## 2017-04-21 NOTE — NURSING NOTE
"Chief Complaint   Patient presents with     Colposcopy       Initial /72 (BP Location: Left arm, Patient Position: Chair, Cuff Size: Adult Regular)  Pulse 95  Temp 98.3  F (36.8  C) (Oral)  Ht 5' 5.5\" (1.664 m)  Wt 140 lb 3.2 oz (63.6 kg)  SpO2 100%  BMI 22.98 kg/m2 Estimated body mass index is 22.98 kg/(m^2) as calculated from the following:    Height as of this encounter: 5' 5.5\" (1.664 m).    Weight as of this encounter: 140 lb 3.2 oz (63.6 kg).  Medication Reconciliation: complete   Adriane Rivera CMA  "

## 2017-04-26 LAB — COPATH REPORT: NORMAL

## 2017-04-26 NOTE — PROGRESS NOTES
Please call pt with results below:     - Consistent with low-grade squamous intraepithelial lesion (NEO-1).  The body will often heal itself from this type of mild HPV lesion. Recommend rechecking diagnostic pap in 6 months.

## 2017-07-27 ENCOUNTER — TELEPHONE (OUTPATIENT)
Dept: FAMILY MEDICINE | Facility: CLINIC | Age: 47
End: 2017-07-27

## 2017-07-27 ENCOUNTER — OFFICE VISIT (OUTPATIENT)
Dept: FAMILY MEDICINE | Facility: CLINIC | Age: 47
End: 2017-07-27
Payer: COMMERCIAL

## 2017-07-27 ENCOUNTER — RADIANT APPOINTMENT (OUTPATIENT)
Dept: GENERAL RADIOLOGY | Facility: CLINIC | Age: 47
End: 2017-07-27
Attending: PHYSICIAN ASSISTANT
Payer: COMMERCIAL

## 2017-07-27 VITALS
HEART RATE: 87 BPM | TEMPERATURE: 98 F | HEIGHT: 66 IN | SYSTOLIC BLOOD PRESSURE: 102 MMHG | DIASTOLIC BLOOD PRESSURE: 62 MMHG | WEIGHT: 140.6 LBS | OXYGEN SATURATION: 100 % | BODY MASS INDEX: 22.6 KG/M2

## 2017-07-27 DIAGNOSIS — S69.91XA INJURY OF RIGHT HAND, INITIAL ENCOUNTER: ICD-10-CM

## 2017-07-27 DIAGNOSIS — S69.91XA INJURY OF RIGHT HAND, INITIAL ENCOUNTER: Primary | ICD-10-CM

## 2017-07-27 PROCEDURE — 73130 X-RAY EXAM OF HAND: CPT | Mod: RT

## 2017-07-27 PROCEDURE — 99213 OFFICE O/P EST LOW 20 MIN: CPT | Performed by: PHYSICIAN ASSISTANT

## 2017-07-27 NOTE — NURSING NOTE
"Chief Complaint   Patient presents with     Musculoskeletal Problem       Initial /62 (BP Location: Right arm, Patient Position: Sitting, Cuff Size: Adult Regular)  Pulse 87  Temp 98  F (36.7  C) (Oral)  Ht 5' 5.5\" (1.664 m)  Wt 140 lb 9.6 oz (63.8 kg)  SpO2 100%  BMI 23.04 kg/m2 Estimated body mass index is 23.04 kg/(m^2) as calculated from the following:    Height as of this encounter: 5' 5.5\" (1.664 m).    Weight as of this encounter: 140 lb 9.6 oz (63.8 kg).  Medication Reconciliation: complete   Nereyda Veras MA  "

## 2017-07-27 NOTE — PROGRESS NOTES
SUBJECTIVE:                                                    hSerry Osorio is a 47 year old female who presents to clinic today for the following health issues:    Concern - Hand injury- playing with dog, pt has tingling pain coming from fingers traveling down.    Onset: 7-26    Description:   Right hand- no longer swollen.    Intensity: severe- yesterday last night when it happened.    Progression of Symptoms:  It is numb on the palm side but no longer swollen, but when moving hand it seems to hurt more      Alleviating factors:  Improved by: Iced her hand and took ibuprofen     Patient was playing fetch with her dog yesterday. While she was on her knees, she lunged forward for the toy with an outstretched hand and hit the ulnar aspect of her right hand directly on the hard wood floor. She heard a cracking sound and felt tingling into her right 3rd, 4th, and 5th digits. Subsequently developed swelling and bruising of the dorsal aspect of the right hand overlying the 4th and 5th metacarpals. Pain is worse with adduction of the 5th metacarpal and extension of the digits, and improves with flexion of fingers. Denies pain with wrist range of motion. She applied ice and the swelling improved. Took ibuprofen last night with minimal improvement in pain.     Patient has no prior history of fractures. Denies other injuries or concerns.     Problem list and histories reviewed & adjusted, as indicated.  Additional history: as documented    Patient Active Problem List   Diagnosis     CARDIOVASCULAR SCREENING; LDL GOAL LESS THAN 130     Advanced directives, counseling/discussion     Multiple lipomas     Sebaceous cyst- left upper forehead      Syncope and collapse     Raynaud's phenomenon - noted 10/17/2011 at first visit      Cystocele with uterine descensus- mild w/ both      Cervical pain     Family history of clotting disorder- sister with MTHFR     Dermatitis- every winter - bilateral flanks and hips     MTHFR  mutation - heterozygote     Headache     Chronic neck pain     Pap smear with atypical squamous cells, cannot exclude high grade squamous intraepithelial lesion (ASC-H)     DDD (degenerative disc disease), cervical     DDD (degenerative disc disease), thoracic     Cervical spinal stenosis     Chronic constipation     Past Surgical History:   Procedure Laterality Date     COLONOSCOPY  2016    Dr. Diaz [facility]     COLONOSCOPY N/A 2016    Procedure: COLONOSCOPY;  Surgeon: Oscar Diaz MD;  Location: [facility]     EXCISE MASS HEAD  2012    Procedure: EXCISE MASS HEAD;  Excision Left Forehead Mass, Excision Right Posterior Thigh Mass, Excision Left Posterior Thigh Mass ;  Surgeon: Felix Trivedi MD;  Location: [facility] OR     EXCISE MASS LOWER EXTREMITY  2012    Procedure: EXCISE MASS LOWER EXTREMITY;;  Surgeon: Felix Trivedi MD;  Location: [facility] OR     wisdom teeth extraction         Social History   Substance Use Topics     Smoking status: Never Smoker     Smokeless tobacco: Never Used     Alcohol use 0.0 - 1.0 oz/week      Comment: maybe 0-1 weekly     Family History   Problem Relation Age of Onset     Asthma Father      Hypertension Father      CEREBROVASCULAR DISEASE Father      Prostate Cancer Father      Neurologic Disorder Father      Lewy Body dementia - broke hip-  at age 76      CEREBROVASCULAR DISEASE Sister 47     Thyroid Disease Sister 68     hypothyroid      Blood Disease Sister 68     MTHFR deficiency - not factor V leiden      CEREBROVASCULAR DISEASE Sister 15     secondary to MTHFR deficiency      CANCER Sister 30     Breast cancer dx'd 2012     Breast Cancer Sister      Blood Disease Sister      CANCER Sister      Depression Sister      Thyroid Disease Sister      Colon Cancer No family hx of          Current Outpatient Prescriptions   Medication Sig Dispense Refill     aspirin 81 MG tablet Take 1 tablet (81 mg) by mouth daily 30 tablet      triamcinolone (KENALOG)  "0.1 % cream Apply topically 3 times daily as needed Apply sparingly to affected area. 80 g 5     No Known Allergies      Reviewed and updated as needed this visit by clinical staff     Reviewed and updated as needed this visit by Provider         ROS:  C: NEGATIVE for fever, chills  I: NEGATIVE for worrisome rashes, moles or lesions  R: NEGATIVE for significant cough or SOB  CV: NEGATIVE for chest pain, palpitations or peripheral edema  M: POSITIVE right hand injury, pain, swelling. NEGATIVE for significant arthralgias or myalgia      OBJECTIVE:     /62 (BP Location: Right arm, Patient Position: Sitting, Cuff Size: Adult Regular)  Pulse 87  Temp 98  F (36.7  C) (Oral)  Ht 5' 5.5\" (1.664 m)  Wt 140 lb 9.6 oz (63.8 kg)  SpO2 100%  BMI 23.04 kg/m2  Body mass index is 23.04 kg/(m^2).  GENERAL: healthy, alert and no distress  RESP: lungs clear to auscultation - no rales, rhonchi or wheezes  CV: regular rate and rhythm, normal S1 S2, no S3 or S4, no murmur, click or rub, no peripheral edema and peripheral pulses strong  MS: Right hand - FROM of right wrist and digits. Pain increased with adduction of 5th digit and extension of digits, and improves with flexion. Swelling and ecchymosis overlying the dorsal aspect of 4th and 5th metacarpals. No bony abnormalities. No snuffbox tenderness. Distal neurovascular is intact.     Diagnostic Test Results:  Xray - No evidence of fracture or dislocation per my interpretation.    ASSESSMENT/PLAN:     1. Injury of right hand, initial encounter  Xrays of the right hand ordered. There is no evidence of fracture or dislocation. Work-up is consistent with a contusion of the right hand. Recommend applying ice and pressure with ACE wrap. Continue to take ibuprofen for inflammation. Return precautions discussed (follow-up if not improving over the next 10-14 days).  - XR Hand Right G/E 3 Views; Future    See Patient Instructions.     MADISON Chan    I performed a history " and physical examination in addition to that performed by the student. The documentation above reflects my personal history and physical findings.    Davida Gastelum PA-C  [facility]

## 2017-07-27 NOTE — TELEPHONE ENCOUNTER
Sherry was playing with her dog and made a dive for a toy last night.  She landed on her side of hand and heard click in her wrist.  Full mobility and can move her fingers but is is very swollen. She  Iced but now has   Numbness and tingling in her hand.  Appt made with Davida Gastelum at 1:40.   Khadra Painter RN- Triage [employer]

## 2017-07-27 NOTE — MR AVS SNAPSHOT
After Visit Summary   7/27/2017    Sherry Osorio    MRN: [id number]           Patient Information     Date Of Birth          1970        Visit Information        Provider Department      7/27/2017 1:40 PM Davida Gastelum PA-C [facility]        Today's Diagnoses     Injury of right hand, initial encounter    -  1      Care Instructions      Upper Extremity Contusion  You have a contusion (bruise) of an upper extremity (arm, wrist, hand, or fingers). Symptoms include pain, swelling, and skin discoloration. No bones are broken. This injury may take from a few days to a few weeks to heal.  During that time, the bruise may change from reddish in color, to purple-blue, to green-yellow, to yellow-brown.  Home care    Unless another medication was prescribed, you can take acetaminophen, ibuprofen, or naproxen to control pain. (If you have chronic liver or kidney disease or ever had a stomach ulcer or GI bleeding, talk with your doctor before using these medicines.)    Elevate the injured area to reduce pain and swelling. As much as possible, sit or lie down with the injured area raised about the level of your heart. This is especially important during the first 48 hours.    Ice the injured area to help reduce pain and swelling. Wrap a cold source (ice pack or ice cubes in a plastic bag) in a thin towel. Apply to the bruised area for 20 minutes every 1 to 2 hours the first day. Continue this 3 to 4 times a day until the pain and swelling goes away.    If a sling was provided, you may remove it to shower or bathe. To prevent joint stiffness, do not wear it for more than one week.  Follow up  Follow up with your health care provider or our staff as advised. Call if you are not improving within the next 1 to 2 weeks.  When to seek medical advice   Call your health care provider right away if any of these occur:    Increased pain or swelling    Hand or fingers become cold, blue, numb or  "tingly    Signs of infection: Warmth, drainage, or increased redness or pain around the injury    Inability to move the injured body part     Frequent bruising for unknown reasons  Date Last Reviewed: 4/29/2015 2000-2017 [employer]. [address] [place] [address], [place] [address]. All rights reserved. This information is not intended as a substitute for professional medical care. Always follow your healthcare professional's instructions.                Follow-ups after your visit        Who to contact     If you have questions or need follow up information about today's clinic visit or your schedule please contact [facility] directly at [phone].  Normal or non-critical lab and imaging results will be communicated to you by MyChart, letter or phone within 4 business days after the clinic has received the results. If you do not hear from us within 7 days, please contact the clinic through [employer] or phone. If you have a critical or abnormal lab result, we will notify you by phone as soon as possible.  Submit refill requests through [employer] or call your pharmacy and they will forward the refill request to us. Please allow 3 business days for your refill to be completed.          Additional Information About Your Visit        [employer] Information     [employer] gives you secure access to your electronic health record. If you see a primary care provider, you can also send messages to your care team and make appointments. If you have questions, please call your primary care clinic.  If you do not have a primary care provider, please call [phone] and they will assist you.        Care EveryWhere ID     This is your Care EveryWhere ID. This could be used by other organizations to access your [place] medical records  [id number]        Your Vitals Were     Pulse Temperature Height Pulse Oximetry BMI (Body Mass Index)       87 98  F (36.7  C) (Oral) 5' 5.5\" (1.664 m) 100% 23.04 kg/m2        " Blood Pressure from Last 3 Encounters:   07/27/17 102/62   04/21/17 108/72   03/10/17 124/80    Weight from Last 3 Encounters:   07/27/17 140 lb 9.6 oz (63.8 kg)   04/21/17 140 lb 3.2 oz (63.6 kg)   03/10/17 139 lb 6.4 oz (63.2 kg)               Primary Care Provider Office Phone # Fax #    Selena Barnes [phone]       [facility] [address] [facility] [address]        Equal Access to Services     [facility]: Hadii aad ku hadasho Soomaali, waaxda luqadaha, qaybta kaalmada adeegyada, erinn cesar . So [facility] [phone].    ATENCIÓN: Si habla español, tiene a chirinos disposición servicios gratuitos de asistencia lingüística. Llame al [phone].    We comply with applicable federal civil rights laws and [place] laws. We do not discriminate on the basis of race, color, national origin, age, disability sex, sexual orientation or gender identity.            Thank you!     Thank you for choosing [facility]  for your care. Our goal is always to provide you with excellent care. Hearing back from our patients is one way we can continue to improve our services. Please take a few minutes to complete the written survey that you may receive in the mail after your visit with us. Thank you!             Your Updated Medication List - Protect others around you: Learn how to safely use, store and throw away your medicines at www.disposemymeds.org.          This list is accurate as of: 7/27/17  2:32 PM.  Always use your most recent med list.                   Brand Name Dispense Instructions for use Diagnosis    aspirin 81 MG tablet     30 tablet    Take 1 tablet (81 mg) by mouth daily    MTHFR mutation (H)       triamcinolone 0.1 % cream    KENALOG    80 g    Apply topically 3 times daily as needed Apply sparingly to affected area.    Dermatitis

## 2017-07-27 NOTE — PATIENT INSTRUCTIONS
Upper Extremity Contusion  You have a contusion (bruise) of an upper extremity (arm, wrist, hand, or fingers). Symptoms include pain, swelling, and skin discoloration. No bones are broken. This injury may take from a few days to a few weeks to heal.  During that time, the bruise may change from reddish in color, to purple-blue, to green-yellow, to yellow-brown.  Home care    Unless another medication was prescribed, you can take acetaminophen, ibuprofen, or naproxen to control pain. (If you have chronic liver or kidney disease or ever had a stomach ulcer or GI bleeding, talk with your doctor before using these medicines.)    Elevate the injured area to reduce pain and swelling. As much as possible, sit or lie down with the injured area raised about the level of your heart. This is especially important during the first 48 hours.    Ice the injured area to help reduce pain and swelling. Wrap a cold source (ice pack or ice cubes in a plastic bag) in a thin towel. Apply to the bruised area for 20 minutes every 1 to 2 hours the first day. Continue this 3 to 4 times a day until the pain and swelling goes away.    If a sling was provided, you may remove it to shower or bathe. To prevent joint stiffness, do not wear it for more than one week.  Follow up  Follow up with your health care provider or our staff as advised. Call if you are not improving within the next 1 to 2 weeks.  When to seek medical advice   Call your health care provider right away if any of these occur:    Increased pain or swelling    Hand or fingers become cold, blue, numb or tingly    Signs of infection: Warmth, drainage, or increased redness or pain around the injury    Inability to move the injured body part     Frequent bruising for unknown reasons  Date Last Reviewed: 4/29/2015 2000-2017 [employer] [address] [place] [address], [place] [address]. All rights reserved. This information is not intended as a substitute for professional  medical care. Always follow your healthcare professional's instructions.

## 2017-11-01 ENCOUNTER — TRANSFERRED RECORDS (OUTPATIENT)
Dept: HEALTH INFORMATION MANAGEMENT | Facility: CLINIC | Age: 47
End: 2017-11-01

## 2017-11-10 ENCOUNTER — OFFICE VISIT (OUTPATIENT)
Dept: FAMILY MEDICINE | Facility: CLINIC | Age: 47
End: 2017-11-10
Payer: COMMERCIAL

## 2017-11-10 VITALS
SYSTOLIC BLOOD PRESSURE: 112 MMHG | DIASTOLIC BLOOD PRESSURE: 72 MMHG | WEIGHT: 141.6 LBS | HEART RATE: 95 BPM | BODY MASS INDEX: 23.21 KG/M2 | OXYGEN SATURATION: 100 % | TEMPERATURE: 98.5 F

## 2017-11-10 DIAGNOSIS — Z23 NEED FOR PROPHYLACTIC VACCINATION AND INOCULATION AGAINST INFLUENZA: ICD-10-CM

## 2017-11-10 DIAGNOSIS — R87.610 PAP SMEAR WITH ATYPICAL SQUAMOUS CELLS, CANNOT EXCLUDE HIGH GRADE SQUAMOUS INTRAEPITHELIAL LESION (ASC-H): Primary | ICD-10-CM

## 2017-11-10 PROCEDURE — 88141 CYTOPATH C/V INTERPRET: CPT | Performed by: FAMILY MEDICINE

## 2017-11-10 PROCEDURE — 99212 OFFICE O/P EST SF 10 MIN: CPT | Mod: 25 | Performed by: FAMILY MEDICINE

## 2017-11-10 PROCEDURE — 90686 IIV4 VACC NO PRSV 0.5 ML IM: CPT | Performed by: FAMILY MEDICINE

## 2017-11-10 PROCEDURE — 88175 CYTOPATH C/V AUTO FLUID REDO: CPT | Performed by: FAMILY MEDICINE

## 2017-11-10 PROCEDURE — 87624 HPV HI-RISK TYP POOLED RSLT: CPT | Performed by: FAMILY MEDICINE

## 2017-11-10 PROCEDURE — 90471 IMMUNIZATION ADMIN: CPT | Performed by: FAMILY MEDICINE

## 2017-11-10 NOTE — MR AVS SNAPSHOT
After Visit Summary   11/10/2017    Sherry Osorio    MRN: [id number]           Patient Information     Date Of Birth          1970        Visit Information        Provider Department      11/10/2017 3:15 PM Selena Barnes MD [facility]        Today's Diagnoses     Pap smear with atypical squamous cells, cannot exclude high grade squamous intraepithelial lesion (ASC-H)    -  1    Need for prophylactic vaccination and inoculation against influenza          Care Instructions    Await pathology results on pap.  Will plan from there.                Thank you for choosing [facility]  for your Health Care. It was a pleasure seeing you at your visit today. Please contact us with any questions or concerns you may have.                   Selena Barnes MD                                  To reach your [facility] care team after hours call:   [phone]    Our clinic hours are:     Monday- 7:30 am - 7:00 pm                             Tuesday through Friday- 7:30 am - 5:00 pm                                        Saturday- 8:00 am - 12:00 pm                  Phone:  [phone]    Our pharmacy hours are:     Monday  8:00 am to 7:00 pm      Tuesday through Friday 8:00am to 6:00pm                        Saturday - 9:00 am to 1:00 pm      Sunday : Closed.              Phone:  [phone]      There is also information available at our web site:  [email or web address]    If your provider ordered any lab tests and you do not receive the results within 10 business days, please call the clinic.    If you need a medication refill please contact your pharmacy.  Please allow 2 business days for your refill to be completed.    Our clinic offers telephone visits and e visits.  Please ask one of your team members to explain more.      Use my[employer]le (secure email communication and access to your chart) to send your primary care provider a message or  make an appointment. Ask someone on your Team how to sign up for [employer].                         Follow-ups after your visit        Who to contact     If you have questions or need follow up information about today's clinic visit or your schedule please contact [facility] directly at [phone].  Normal or non-critical lab and imaging results will be communicated to you by MyChart, letter or phone within 4 business days after the clinic has received the results. If you do not hear from us within 7 days, please contact the clinic through [employer] or phone. If you have a critical or abnormal lab result, we will notify you by phone as soon as possible.  Submit refill requests through [employer] or call your pharmacy and they will forward the refill request to us. Please allow 3 business days for your refill to be completed.          Additional Information About Your Visit        [employer] Information     [employer] gives you secure access to your electronic health record. If you see a primary care provider, you can also send messages to your care team and make appointments. If you have questions, please call your primary care clinic.  If you do not have a primary care provider, please call [phone] and they will assist you.        Care EveryWhere ID     This is your Care EveryWhere ID. This could be used by other organizations to access your [place] medical records  [id number]        Your Vitals Were     Pulse Temperature Pulse Oximetry BMI (Body Mass Index)          95 98.5  F (36.9  C) (Oral) 100% 23.21 kg/m2         Blood Pressure from Last 3 Encounters:   11/10/17 112/72   07/27/17 102/62   04/21/17 108/72    Weight from Last 3 Encounters:   11/10/17 141 lb 9.6 oz (64.2 kg)   07/27/17 140 lb 9.6 oz (63.8 kg)   04/21/17 140 lb 3.2 oz (63.6 kg)              We Performed the Following          ADMIN VACCINE, FIRST [53272]     HC FLU VAC PRESRV FREE QUAD SPLIT VIR 3+YRS IM  [[id number]]     HPV High Risk  Types DNA Cervical     Pap imaged thin layer diagnostic with HPV (select HPV order below)        Primary Care Provider Office Phone # Fax #    Selena Barnes [phone] [address] [facility] [address]        Equal Access to Services     LENAJULIA GENEVIEVE : Hadii aad ku hadasho Soomaali, waaxda luqadaha, qaybta kaalmada adeegyada, waxfran ramirez alycian donavan vegamuluroly knox. So [facility] [phone].    ATENCIÓN: Si habla español, tiene a chirinos disposición servicios gratuitos de asistencia lingüística. Llame al [phone].    We comply with applicable federal civil rights laws and [place] laws. We do not discriminate on the basis of race, color, national origin, age, disability, sex, sexual orientation, or gender identity.            Thank you!     Thank you for choosing [facility]  for your care. Our goal is always to provide you with excellent care. Hearing back from our patients is one way we can continue to improve our services. Please take a few minutes to complete the written survey that you may receive in the mail after your visit with us. Thank you!             Your Updated Medication List - Protect others around you: Learn how to safely use, store and throw away your medicines at www.disposemymeds.org.          This list is accurate as of: 11/10/17  3:59 PM.  Always use your most recent med list.                   Brand Name Dispense Instructions for use Diagnosis    aspirin 81 MG tablet     30 tablet    Take 1 tablet (81 mg) by mouth daily    MTHFR mutation (H)       triamcinolone 0.1 % cream    KENALOG    80 g    Apply topically 3 times daily as needed Apply sparingly to affected area.    Dermatitis

## 2017-11-10 NOTE — PATIENT INSTRUCTIONS
Await pathology results on pap.  Will plan from there.                Thank you for choosing [facility]  for your Health Care. It was a pleasure seeing you at your visit today. Please contact us with any questions or concerns you may have.                   Selena Barnes MD                                  To reach your [facility] care team after hours call:   [phone]    Our clinic hours are:     Monday- 7:30 am - 7:00 pm                             Tuesday through Friday- 7:30 am - 5:00 pm                                        Saturday- 8:00 am - 12:00 pm                  Phone:  [phone]    Our pharmacy hours are:     Monday  8:00 am to 7:00 pm      Tuesday through Friday 8:00am to 6:00pm                        Saturday - 9:00 am to 1:00 pm      Sunday : Closed.              Phone:  [phone]      There is also information available at our web site:  [email or web address]    If your provider ordered any lab tests and you do not receive the results within 10 business days, please call the clinic.    If you need a medication refill please contact your pharmacy.  Please allow 2 business days for your refill to be completed.    Our clinic offers telephone visits and e visits.  Please ask one of your team members to explain more.      Use [employer] (secure email communication and access to your chart) to send your primary care provider a message or make an appointment. Ask someone on your Team how to sign up for [employer].

## 2017-11-10 NOTE — NURSING NOTE

## 2017-11-10 NOTE — NURSING NOTE
"Chief Complaint   Patient presents with     Repeat Pap Smear       Initial /72 (BP Location: Left arm, Patient Position: Chair, Cuff Size: Adult Regular)  Pulse 95  Temp 98.5  F (36.9  C) (Oral)  Wt 141 lb 9.6 oz (64.2 kg)  SpO2 100%  BMI 23.21 kg/m2 Estimated body mass index is 23.21 kg/(m^2) as calculated from the following:    Height as of 7/27/17: 5' 5.5\" (1.664 m).    Weight as of this encounter: 141 lb 9.6 oz (64.2 kg).  Medication Reconciliation: complete   Adriane Rivera CMA  "

## 2017-11-10 NOTE — PROGRESS NOTES
SUBJECTIVE:                                                    Sherry Osorio is a 47 year old female who presents to clinic today for the following health issues:    Patient is here today for a follow up pap smear due having an abnormal pap smear in the past.   Recent pap hx :   2011, 2014: [facility] pap,neg HPV Plan cotest in 3 yrs.  1/2016 Pap NIL, cotest one year.  03/10/17: ASC-H pap, Neg HR HPV result. Plan [place] due by 06/10/17.  4/21/17: [place]-CIN1. Plan: diagnostic Pap in 6 mo.    Patient Active Problem List   Diagnosis     CARDIOVASCULAR SCREENING; LDL GOAL LESS THAN 130     Advanced directives, counseling/discussion     Multiple lipomas     Sebaceous cyst- left upper forehead      Syncope and collapse     Raynaud's phenomenon - noted 10/17/2011 at first visit      Cystocele with uterine descensus- mild w/ both      Cervical pain     Family history of clotting disorder- sister with MTHFR     Dermatitis- every winter - bilateral flanks and hips     MTHFR mutation - heterozygote     Headache     Chronic neck pain     Pap smear with atypical squamous cells, cannot exclude high grade squamous intraepithelial lesion (ASC-H)     DDD (degenerative disc disease), cervical     DDD (degenerative disc disease), thoracic     Cervical spinal stenosis     Chronic constipation       Current Outpatient Prescriptions   Medication Sig Dispense Refill     aspirin 81 MG tablet Take 1 tablet (81 mg) by mouth daily 30 tablet      triamcinolone (KENALOG) 0.1 % cream Apply topically 3 times daily as needed Apply sparingly to affected area. 80 g 5        No Known Allergies       Problem list and histories reviewed & adjusted, as indicated.  Additional history: as documented    Reviewed and updated as needed this visit by clinical staff  Tobacco  Allergies  Meds  Med Hx  Surg Hx  Fam Hx  Soc Hx      Reviewed and updated as needed this visit by Provider         ROS:   ROS: 12 point ROS neg other than the symptoms noted  above    OBJECTIVE:                                                    /72 (BP Location: Left arm, Patient Position: Chair, Cuff Size: Adult Regular)  Pulse 95  Temp 98.5  F (36.9  C) (Oral)  Wt 141 lb 9.6 oz (64.2 kg)  SpO2 100%  BMI 23.21 kg/m2  Body mass index is 23.21 kg/(m^2).   GENERAL: healthy, alert, well nourished, well hydrated, no distress  HENT:   Mouth- no ulcers, no lesions  NECK: no tenderness, no adenopathy, no asymmetry, no masses, no stiffness;  RESP: lungs clear to auscultation - no rales, no rhonchi, no wheezes  CV: regular rates and rhythm, normal S1 S2, no S3 or S4 and no murmur, no click or rub -  ABDOMEN: soft, no tenderness, no  hepatosplenomegaly, no masses, normal bowel sounds  Vagina and vulva are normal;  no discharge is noted.  Cervix normal without lesions. Uterus anteverted and mobile, normal in size and shape without tenderness.  Adnexa normal in size without masses or tenderness. Pap Smear - is completed today.        ASSESSMENT/PLAN:                                                        ICD-10-CM    1. Pap smear with atypical squamous cells, cannot exclude high grade squamous intraepithelial lesion (ASC-H) R87.611 Pap imaged thin layer diagnostic with HPV (select HPV order below)     HPV High Risk Types DNA Cervical   2. Need for prophylactic vaccination and inoculation against influenza Z23 HC FLU VAC PRESRV FREE QUAD SPLIT VIR 3+YRS IM  [95248]          ADMIN VACCINE, FIRST [60206]       See Patient Instructions. Await path results.  Will proceed per current abnormal pap recomrnendations.          Selena Barnes MD    [facility]

## 2017-11-16 LAB
COPATH REPORT: ABNORMAL
PAP: ABNORMAL

## 2017-12-05 ENCOUNTER — TRANSFERRED RECORDS (OUTPATIENT)
Dept: HEALTH INFORMATION MANAGEMENT | Facility: CLINIC | Age: 47
End: 2017-12-05

## 2017-12-13 ENCOUNTER — TRANSFERRED RECORDS (OUTPATIENT)
Dept: HEALTH INFORMATION MANAGEMENT | Facility: CLINIC | Age: 47
End: 2017-12-13

## 2017-12-15 ENCOUNTER — TRANSFERRED RECORDS (OUTPATIENT)
Dept: HEALTH INFORMATION MANAGEMENT | Facility: CLINIC | Age: 47
End: 2017-12-15

## 2018-03-26 ENCOUNTER — TELEPHONE (OUTPATIENT)
Dept: FAMILY MEDICINE | Facility: CLINIC | Age: 48
End: 2018-03-26

## 2018-03-26 NOTE — TELEPHONE ENCOUNTER
I called [facility] and requested these records to be faxed.  Emani Santos RN-Pap Tracking

## 2018-04-21 ENCOUNTER — HEALTH MAINTENANCE LETTER (OUTPATIENT)
Age: 48
End: 2018-04-21

## 2018-07-19 ENCOUNTER — HOSPITAL ENCOUNTER (OUTPATIENT)
Dept: MAMMOGRAPHY | Facility: CLINIC | Age: 48
Discharge: HOME | End: 2018-07-19
Attending: FAMILY MEDICINE | Admitting: FAMILY MEDICINE
Payer: COMMERCIAL

## 2018-07-19 ENCOUNTER — OFFICE VISIT (OUTPATIENT)
Dept: FAMILY MEDICINE | Facility: CLINIC | Age: 48
End: 2018-07-19
Payer: COMMERCIAL

## 2018-07-19 VITALS
DIASTOLIC BLOOD PRESSURE: 72 MMHG | TEMPERATURE: 98.4 F | HEIGHT: 66 IN | WEIGHT: 147 LBS | OXYGEN SATURATION: 100 % | HEART RATE: 91 BPM | BODY MASS INDEX: 23.63 KG/M2 | SYSTOLIC BLOOD PRESSURE: 112 MMHG

## 2018-07-19 DIAGNOSIS — Z12.31 VISIT FOR SCREENING MAMMOGRAM: ICD-10-CM

## 2018-07-19 DIAGNOSIS — M54.50 ACUTE LEFT-SIDED LOW BACK PAIN WITHOUT SCIATICA: Primary | ICD-10-CM

## 2018-07-19 PROCEDURE — 99213 OFFICE O/P EST LOW 20 MIN: CPT | Performed by: FAMILY MEDICINE

## 2018-07-19 PROCEDURE — 77063 BREAST TOMOSYNTHESIS BI: CPT

## 2018-07-19 RX ORDER — CYCLOBENZAPRINE HCL 5 MG
5-10 TABLET ORAL 3 TIMES DAILY PRN
Qty: 40 TABLET | Refills: 1 | Status: SHIPPED | OUTPATIENT
Start: 2018-07-19 | End: 2020-12-16

## 2018-07-19 NOTE — MR AVS SNAPSHOT
"              After Visit Summary   7/19/2018    Sherry Osorio    MRN: [id number]           Patient Information     Date Of Birth          1970        Visit Information        Provider Department      7/19/2018 10:00 AM Georges Snyder MD [facility] [place]        Today's Diagnoses     Acute left-sided low back pain without sciatica    -  1    Visit for screening mammogram           Follow-ups after your visit        Additional Services     PHYSICAL THERAPY REFERRAL       *This therapy referral will be filtered to a centralized scheduling office at [facility] and the patient will receive a call to schedule an appointment at a [place] location most convenient for them. *     [facility] provides Physical Therapy evaluation and treatment and many specialty services across the [place] system.  If requesting a specialty program, please choose from the list below.    If you have not heard from the scheduling office within 2 business days, please call [phone] for all locations, with the exception of [place], please call [phone] and [facility], please call [phone]  Treatment: Evaluation & Treatment  Special Instructions/Modalities:   Special Programs:     Please be aware that coverage of these services is subject to the terms and limitations of your health insurance plan.  Call member services at your health plan with any benefit or coverage questions.      **Note to Provider:  If you are referring outside of [place] for the therapy appointment, please list the name of the location in the \"special instructions\" above, print the referral and give to the patient to schedule the appointment.                  Future tests that were ordered for you today     Open Future Orders        Priority Expected Expires Ordered    MA Screen Bilateral w/Christian Routine  7/18/2019 7/18/2018            Who to contact     If you have questions or need follow up " "information about today's clinic visit or your schedule please contact [facility] [place] directly at [phone].  Normal or non-critical lab and imaging results will be communicated to you by Hairbobohart, letter or phone within 4 business days after the clinic has received the results. If you do not hear from us within 7 days, please contact the clinic through Hairbobohart or phone. If you have a critical or abnormal lab result, we will notify you by phone as soon as possible.  Submit refill requests through Tittat or call your pharmacy and they will forward the refill request to us. Please allow 3 business days for your refill to be completed.          Additional Information About Your Visit        Hairbobohart Information     Tittat gives you secure access to your electronic health record. If you see a primary care provider, you can also send messages to your care team and make appointments. If you have questions, please call your primary care clinic.  If you do not have a primary care provider, please call [phone] and they will assist you.        Care EveryWhere ID     This is your Care EveryWhere ID. This could be used by other organizations to access your [facility] medical records  [id number]        Your Vitals Were     Pulse Temperature Height Pulse Oximetry BMI (Body Mass Index)       91 98.4  F (36.9  C) (Oral) 5' 5.5\" (1.664 m) 100% 24.09 kg/m2        Blood Pressure from Last 3 Encounters:   07/19/18 112/72   11/10/17 112/72   07/27/17 102/62    Weight from Last 3 Encounters:   07/19/18 147 lb (66.7 kg)   11/10/17 141 lb 9.6 oz (64.2 kg)   07/27/17 140 lb 9.6 oz (63.8 kg)              We Performed the Following     PHYSICAL THERAPY REFERRAL          Today's Medication Changes          These changes are accurate as of 7/19/18 10:27 AM.  If you have any questions, ask your nurse or doctor.               Start taking these medicines.        Dose/Directions    cyclobenzaprine 5 MG tablet   Commonly known as:  " FLEXERIL   Used for:  Acute left-sided low back pain without sciatica   Started by:  Georges Snyder MD        Dose:  5-10 mg   Take 1-2 tablets (5-10 mg) by mouth 3 times daily as needed for muscle spasms   Quantity:  40 tablet   Refills:  1            Where to get your medicines      These medications were sent to [id number] - [place], [place] - [address] AT [facility] [address]  [address] [place] [address]     Phone:  [phone]     cyclobenzaprine 5 MG tablet                Primary Care Provider Office Phone # Fax #    Selenaradha Barnes [phone]       [address] [place] [address]        Equal Access to Services     [facility]: Hadii lorraine zacarias hadasho Soomaali, waaxda luqadaha, qaybta kaalmada adeegyada, erinn cesar . So [facility] [phone].    ATENCIÓN: Si habla español, tiene a chirinos disposición servicios gratuitos de asistencia lingüística. [facility] [phone].    We comply with applicable federal civil rights laws and [place] laws. We do not discriminate on the basis of race, color, national origin, age, disability, sex, sexual orientation, or gender identity.            Thank you!     Thank you for choosing [facility]  for your care. Our goal is always to provide you with excellent care. Hearing back from our patients is one way we can continue to improve our services. Please take a few minutes to complete the written survey that you may receive in the mail after your visit with us. Thank you!             Your Updated Medication List - Protect others around you: Learn how to safely use, store and throw away your medicines at www.disposemymeds.org.          This list is accurate as of 7/19/18 10:27 AM.  Always use your most recent med list.                   Brand Name Dispense Instructions for use Diagnosis    aspirin 81 MG tablet     30 tablet    Take 1 tablet (81 mg) by mouth daily    MTHFR  mutation (H)       cyclobenzaprine 5 MG tablet    FLEXERIL    40 tablet    Take 1-2 tablets (5-10 mg) by mouth 3 times daily as needed for muscle spasms    Acute left-sided low back pain without sciatica       triamcinolone 0.1 % cream    KENALOG    80 g    Apply topically 3 times daily as needed Apply sparingly to affected area.    Dermatitis

## 2018-07-19 NOTE — PROGRESS NOTES
"  SUBJECTIVE:   Sherry Osorio is a 48 year old female who presents to clinic today for the following health issues:    Back Pain Follow Up - Pt thinks it might be due her sleep number bed or that she's been driving a lot more      Description:   Location of pain:  X a couple of days, Lower back down spine, pt has a history of upper back and neck pain but this is a new location  Character of pain: spasm, tightness, pulling and intermittent  Pain radiation: Radiates up into neck  Since last visit, pain is:  New location  New numbness or weakness in legs, not attributed to pain:  no     Intensity: Depends on how she moves    History:   Pain interferes with job: 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    ROS:  General, neuro, sleep, psych, musculoskeletal system otherwise negative.     /72 (BP Location: Right arm, Patient Position: Sitting, Cuff Size: Adult Regular)  Pulse 91  Temp 98.4  F (36.9  C) (Oral)  Ht 5' 5.5\" (1.664 m)  Wt 147 lb (66.7 kg)  SpO2 100%  BMI 24.09 kg/m2    Back examination: Back symmetric, no curvature. ROM normal. No CVA tenderness.  [unfilled] leg raise test: negative  GENERAL APPEARANCE: healthy and mild distress  RESP: lungs clear to auscultation - no rales, rhonchi or wheezes  CV: regular rates and rhythm, normal S1 S2, no murmur noted  NEURO: Normal strength and tone with no weakness or sensory deficit noted, reflexes normal   SKIN: no suspicious lesions or rashes    ASSESSMENT/IMPRESSION:  lumbosacral strain    PLAN:1) PLEASE SEE ORDER SUMMARY  [unfilled]:      1.  Continue stretching and strengthening exercises.       2.  Continue prn heat or ice application.    PT and muscle relaxant   Pt instructed to come back to the clinic for worsening sx      "

## 2018-07-30 ENCOUNTER — THERAPY VISIT (OUTPATIENT)
Dept: PHYSICAL THERAPY | Facility: CLINIC | Age: 48
End: 2018-07-30
Payer: COMMERCIAL

## 2018-07-30 DIAGNOSIS — M54.50 LEFT-SIDED LOW BACK PAIN WITHOUT SCIATICA, UNSPECIFIED CHRONICITY: Primary | ICD-10-CM

## 2018-07-30 PROCEDURE — 97161 PT EVAL LOW COMPLEX 20 MIN: CPT | Mod: GP | Performed by: PHYSICAL THERAPIST

## 2018-07-30 PROCEDURE — 97110 THERAPEUTIC EXERCISES: CPT | Mod: GP | Performed by: PHYSICAL THERAPIST

## 2018-07-30 NOTE — MR AVS SNAPSHOT
After Visit Summary   7/30/2018    Sherry Osorio    MRN: [id number]           Patient Information     Date Of Birth          1970        Visit Information        Provider Department      7/30/2018 9:50 AM Bob Black PT [facility] [place]        Today's Diagnoses     Left-sided low back pain without sciatica, unspecified chronicity    -  1       Follow-ups after your visit        Your next 10 appointments already scheduled     Aug 06, 2018  9:50 AM CDT   [facility] Spine with Bob Black PT   [facility] ([facility] [place])    [address] [id number] [phone]            Aug 20, 2018  9:50 AM CDT   BH[facility] Spine with Bob Black PT   [facility] ([facility] [place])    [address]  [place] [id number] [phone]              Who to contact     If you have questions or need follow up information about today's clinic visit or your schedule please contact [place] [facility] [place] directly at [phone].  Normal or non-critical lab and imaging results will be communicated to you by MyChart, letter or phone within 4 business days after the clinic has received the results. If you do not hear from us within 7 days, please contact the clinic through Nextivahart or phone. If you have a critical or abnormal lab result, we will notify you by phone as soon as possible.  Submit refill requests through [employer] or call your pharmacy and they will forward the refill request to us. Please allow 3 business days for your refill to be completed.          Additional Information About Your Visit        MyChart Information     [employer] gives you secure access to your electronic health record. If you see a primary care provider, you can also send messages to your care team and make appointments. If you have questions, please call your primary care clinic.  If you do not have a primary care provider,  please call [phone] and they will assist you.        Care EveryWhere ID     This is your Care EveryWhere ID. This could be used by other organizations to access your [facility] medical records  [id number]         Blood Pressure from Last 3 Encounters:   07/19/18 112/72   11/10/17 112/72   07/27/17 102/62    Weight from Last 3 Encounters:   07/19/18 66.7 kg (147 lb)   11/10/17 64.2 kg (141 lb 9.6 oz)   07/27/17 63.8 kg (140 lb 9.6 oz)              We Performed the Following     HC PT EVAL, LOW COMPLEXITY     BHAVESH INITIAL EVAL REPORT     THERAPEUTIC EXERCISES        Primary Care Provider Office Phone # Fax #    Selena Barnes [phone]       [address] [place] [address]        Equal Access to Services     [facility]: Hadii aad ku hadasho Sokaylah, waaxda luqadaha, qaybta kaalmada adeegyada, erinn ramirez hayricky cesar . So [facility] [phone].    ATENCIÓN: Si habla español, tiene a chirinos disposición servicios gratuitos de asistencia lingüística. Iván al [phone].    We comply with applicable federal civil rights laws and [place] laws. We do not discriminate on the basis of race, color, national origin, age, disability, sex, sexual orientation, or gender identity.            Thank you!     Thank you for choosing [facility] [place]  for your care. Our goal is always to provide you with excellent care. Hearing back from our patients is one way we can continue to improve our services. Please take a few minutes to complete the written survey that you may receive in the mail after your visit with us. Thank you!             Your Updated Medication List - Protect others around you: Learn how to safely use, store and throw away your medicines at www.disposemymeds.org.          This list is accurate as of 7/30/18 10:43 AM.  Always use your most recent med list.                   Brand Name Dispense Instructions for use Diagnosis    aspirin 81 MG  tablet     30 tablet    Take 1 tablet (81 mg) by mouth daily    MTHFR mutation (H)       cyclobenzaprine 5 MG tablet    FLEXERIL    40 tablet    Take 1-2 tablets (5-10 mg) by mouth 3 times daily as needed for muscle spasms    Acute left-sided low back pain without sciatica       triamcinolone 0.1 % cream    KENALOG    80 g    Apply topically 3 times daily as needed Apply sparingly to affected area.    Dermatitis

## 2018-07-30 NOTE — PROGRESS NOTES
[facility] Initial Evaluation  Subjective:  Patient is a 48 year old female presenting with rehab back hpi. The history is provided by the patient. No  was used.   Sherry Osorio is a 48 year old female with a lumbar condition.  Condition occurred with:  Degenerative joint disease (Pt with Hx of Cervical/Thoracic DJD, with increased LBP started about 1 month ago. No known injury.  Lower left side pain, now feeling litle better with taking some flexeril).  Condition occurred: for unknown reasons.  This is a new condition  June 2018  .    Patient reports pain:  Lower lumbar spine and lumbar spine left.  Radiates to:  No radiation.  Pain is described as aching and is intermittent and reported as 2/10.  Associated symptoms:  Loss of motion. Pain is the same all the time.  Symptoms are exacerbated by lifting, certain positions, standing and sitting and relieved by muscle relaxants.  Since onset symptoms are gradually improving.        General health as reported by patient is good.  Pertinent medical history includes:  Osteoarthritis and cancer.  Medical allergies: no.  Other surgeries include:  Cancer surgery ([facility]).  Current medications:  None as reported by the patient.  Current occupation is educator.        Barriers include:  None as reported by the patient.    Red flags:  None as reported by the patient.                        Objective:  System         Lumbar/SI Evaluation  ROM:  Arom wnl lumbar: repeated L SG  2 x 10, better with L SB ROM.  GONZALEZ x 2 no change, feels some neck tension.  AROM Lumbar:   Flexion:            WNL, some L sided ache (worse)  Ext:                    Min loss no worse   Side Bend:        Left:  Min loss L sided limits    Right:  WNL pulls L  Rotation:           Left:     Right:   Side Glide:        Left:  No pain, min loss    Right:  + pain          Lumbar Myotomes:  normal                  Neural Tension/Mobility:  Lumbar:  Normal                                                   Hip Evaluation  Hip PROM:  Hip PROM:  Left Hip:    Normal  Right Hip:  Normal                          Hip Strength:        Abduction:  Left: 4-/5    -   Pain:Right: 5-/5    Pain:                                 General     ROS    Assessment/Plan:    Patient is a 48 year old female with lumbar complaints.    Patient has the following significant findings with corresponding treatment plan.                Diagnosis 1:  L LBP  Pain -  hot/cold therapy, self management, education, directional preference exercise and home program  Decreased ROM/flexibility - manual therapy and therapeutic exercise  Decreased strength - therapeutic exercise and therapeutic activities  Decreased function - therapeutic activities    Therapy Evaluation Codes:   1) History comprised of:   Personal factors that impact the plan of care:      None.    Comorbidity factors that impact the plan of care are:      Osteoarthritis.     Medications impacting care: None.  2) Examination of Body Systems comprised of:   Body structures and functions that impact the plan of care:      Hip and Lumbar spine.   Activity limitations that impact the plan of care are:      Lifting, Sitting and Standing.  3) Clinical presentation characteristics are:   Stable/Uncomplicated.  4) Decision-Making    Low complexity using standardized patient assessment instrument and/or measureable assessment of functional outcome.  Cumulative Therapy Evaluation is: Low complexity.    Previous and current functional limitations:  (See Goal Flow Sheet for this information)    Short term and Long term goals: (See Goal Flow Sheet for this information)     Communication ability:  Patient appears to be able to clearly communicate and understand verbal and written communication and follow directions correctly.  Treatment Explanation - The following has been discussed with the patient:   RX ordered/plan of care  Anticipated outcomes  Possible  risks and side effects  This patient would benefit from PT intervention to resume normal activities.   Rehab potential is excellent.    Frequency:  1 X week, once daily  Duration:  for 6 weeks  Discharge Plan:  Achieve all LTG.  Independent in home treatment program.  Reach maximal therapeutic benefit.    Please refer to the daily flowsheet for treatment today, total treatment time and time spent performing 1:1 timed codes.

## 2018-08-02 ENCOUNTER — TRANSFERRED RECORDS (OUTPATIENT)
Dept: HEALTH INFORMATION MANAGEMENT | Facility: CLINIC | Age: 48
End: 2018-08-02

## 2018-08-06 ENCOUNTER — THERAPY VISIT (OUTPATIENT)
Dept: PHYSICAL THERAPY | Facility: CLINIC | Age: 48
End: 2018-08-06
Payer: COMMERCIAL

## 2018-08-06 DIAGNOSIS — M54.50 LEFT-SIDED LOW BACK PAIN WITHOUT SCIATICA, UNSPECIFIED CHRONICITY: ICD-10-CM

## 2018-08-06 PROCEDURE — 97112 NEUROMUSCULAR REEDUCATION: CPT | Mod: GP | Performed by: PHYSICAL THERAPIST

## 2018-08-06 PROCEDURE — 97110 THERAPEUTIC EXERCISES: CPT | Mod: GP | Performed by: PHYSICAL THERAPIST

## 2018-08-20 ENCOUNTER — THERAPY VISIT (OUTPATIENT)
Dept: PHYSICAL THERAPY | Facility: CLINIC | Age: 48
End: 2018-08-20
Payer: COMMERCIAL

## 2018-08-20 DIAGNOSIS — M54.50 LEFT-SIDED LOW BACK PAIN WITHOUT SCIATICA, UNSPECIFIED CHRONICITY: ICD-10-CM

## 2018-08-20 PROCEDURE — 97110 THERAPEUTIC EXERCISES: CPT | Mod: GP | Performed by: PHYSICAL THERAPIST

## 2018-08-20 PROCEDURE — 97112 NEUROMUSCULAR REEDUCATION: CPT | Mod: GP | Performed by: PHYSICAL THERAPIST

## 2018-09-18 ENCOUNTER — THERAPY VISIT (OUTPATIENT)
Dept: PHYSICAL THERAPY | Facility: CLINIC | Age: 48
End: 2018-09-18
Payer: COMMERCIAL

## 2018-09-18 DIAGNOSIS — M54.50 LEFT-SIDED LOW BACK PAIN WITHOUT SCIATICA, UNSPECIFIED CHRONICITY: ICD-10-CM

## 2018-09-18 PROCEDURE — 97140 MANUAL THERAPY 1/> REGIONS: CPT | Mod: GP | Performed by: PHYSICAL THERAPIST

## 2018-09-18 PROCEDURE — 97110 THERAPEUTIC EXERCISES: CPT | Mod: GP | Performed by: PHYSICAL THERAPIST

## 2018-09-18 NOTE — PROGRESS NOTES
Subjective:  HPI  Oswestry Score: 8 %                 Objective:  System    Physical Exam    General     ROS    Assessment/Plan:    DISCHARGE REPORT    Progress reporting period is from initial to 9/18.       SUBJECTIVE  Subjective changes noted by patient:  Sherry returns to PT feeling in general better, she has started working again/teaching and at times her posture habits impacts her as the day goes on.  Walking for longer distances does not cause back or hips to fatigue.    Current pain level is 0/10  .     Previous pain level was  2/10 Initial Pain level: 2/10.   Changes in function:  Yes (See Goal flowsheet attached for changes in current functional level)  Adverse reaction to treatment or activity: None    OBJECTIVE  Changes noted in objective findings:  Yes, Lumbar ROM Flex WNL (palms flat), SB WNL Bilat, SG to L min loss, SG R WNL, Extension WNL no pain feels better than before, thoracic flex WNL no pain, Rot B WNL, Extension min loss w pain (region of T9-T11)     ASSESSMENT/PLAN  Updated problem list and treatment plan: Diagnosis 1:  L LBP, thoracic pain  Pain -  hot/cold therapy, manual therapy, self management, education and home program  Decreased ROM/flexibility - manual therapy and therapeutic exercise  Decreased strength - therapeutic exercise and therapeutic activities  Decreased function - therapeutic activities  Impaired posture - neuro re-education  STG/LTGs have been met or progress has been made towards goals:  Yes (See Goal flow sheet completed today.)  Assessment of Progress: The patient's condition is improving.  Self Management Plans:  Patient has been instructed in a home treatment program.  Patient  has been instructed in self management of symptoms.  I have re-evaluated this patient and find that the nature, scope, duration and intensity of the therapy is appropriate for the medical condition of the patient.  Sherry continues to require the following intervention to meet STG and LTG's:   PT    Recommendations:  This patient is ready to be discharged from therapy and continue their home treatment program.  Pt will follow up with PT as needed over the next 1 month.    Please refer to the daily flowsheet for treatment today, total treatment time and time spent performing 1:1 timed codes.

## 2018-09-18 NOTE — MR AVS SNAPSHOT
After Visit Summary   9/18/2018    Sherry Osorio    MRN: [id number]           Patient Information     Date Of Birth          1970        Visit Information        Provider Department      9/18/2018 4:20 PM Bob Black PT [place] [facility] [place]        Today's Diagnoses     Left-sided low back pain without sciatica, unspecified chronicity           Follow-ups after your visit        Who to contact     If you have questions or need follow up information about today's clinic visit or your schedule please contact [place] [facility] [place] directly at [phone].  Normal or non-critical lab and imaging results will be communicated to you by La Cartooneriehart, letter or phone within 4 business days after the clinic has received the results. If you do not hear from us within 7 days, please contact the clinic through Digital Lifeboatt or phone. If you have a critical or abnormal lab result, we will notify you by phone as soon as possible.  Submit refill requests through Runivermag or call your pharmacy and they will forward the refill request to us. Please allow 3 business days for your refill to be completed.          Additional Information About Your Visit        MyChart Information     Runiver[employer] gives you secure access to your electronic health record. If you see a primary care provider, you can also send messages to your care team and make appointments. If you have questions, please call your primary care clinic.  If you do not have a primary care provider, please call [phone] and they will assist you.        Care EveryWhere ID     This is your Care EveryWhere ID. This could be used by other organizations to access your [facility] medical records  [id number]         Blood Pressure from Last 3 Encounters:   07/19/18 112/72   11/10/17 112/72   07/27/17 102/62    Weight from Last 3 Encounters:   07/19/18 66.7 kg (147 lb)   11/10/17 64.2 kg (141 lb 9.6 oz)   07/27/17 63.8 kg (140 lb  9.6 oz)              We Performed the Following     BHAVESH PROGRESS NOTES REPORT     MANUAL THER TECH,1+REGIONS,EA 15 MIN     THERAPEUTIC EXERCISES        Primary Care Provider Office Phone # Fax #    Selena Barnes [phone]       [address] [place] [address]        Equal Access to Services     [facility] : Hadii aad ku hadasho Soomaali, waaxda luqadaha, qaybta kaalmada adeegyada, waxay idiin hayaan adeeg chrissh la'aan . So [facility] [phone].    ATENCIÓN: Si habla español, tiene a chirinos disposición servicios gratuitos de asistencia lingüística. Llame al [phone].    We comply with applicable federal civil rights laws and [place] laws. We do not discriminate on the basis of race, color, national origin, age, disability, sex, sexual orientation, or gender identity.            Thank you!     Thank you for choosing [facility] [place]  for your care. Our goal is always to provide you with excellent care. Hearing back from our patients is one way we can continue to improve our services. Please take a few minutes to complete the written survey that you may receive in the mail after your visit with us. Thank you!             Your Updated Medication List - Protect others around you: Learn how to safely use, store and throw away your medicines at www.disposemymeds.org.          This list is accurate as of 9/18/18  5:12 PM.  Always use your most recent med list.                   Brand Name Dispense Instructions for use Diagnosis    aspirin 81 MG tablet     30 tablet    Take 1 tablet (81 mg) by mouth daily    MTHFR mutation (H)       cyclobenzaprine 5 MG tablet    FLEXERIL    40 tablet    Take 1-2 tablets (5-10 mg) by mouth 3 times daily as needed for muscle spasms    Acute left-sided low back pain without sciatica       triamcinolone 0.1 % cream    KENALOG    80 g    Apply topically 3 times daily as needed Apply sparingly to affected area.    Dermatitis

## 2018-12-20 ENCOUNTER — TELEPHONE (OUTPATIENT)
Dept: FAMILY MEDICINE | Facility: CLINIC | Age: 48
End: 2018-12-20

## 2018-12-20 NOTE — TELEPHONE ENCOUNTER
Pt is past due for f/u pap smear.  Spoke with pt, thanked us for the reminder.  Kathryn Cuevas,    Pap Tracking

## 2019-06-28 ENCOUNTER — OFFICE VISIT (OUTPATIENT)
Dept: FAMILY MEDICINE | Facility: CLINIC | Age: 49
End: 2019-06-28
Payer: COMMERCIAL

## 2019-06-28 VITALS
WEIGHT: 146.8 LBS | DIASTOLIC BLOOD PRESSURE: 76 MMHG | HEART RATE: 103 BPM | SYSTOLIC BLOOD PRESSURE: 118 MMHG | HEIGHT: 66 IN | BODY MASS INDEX: 23.59 KG/M2 | OXYGEN SATURATION: 99 % | TEMPERATURE: 98.7 F

## 2019-06-28 DIAGNOSIS — Z11.4 SCREENING FOR HUMAN IMMUNODEFICIENCY VIRUS: ICD-10-CM

## 2019-06-28 DIAGNOSIS — Z13.6 CARDIOVASCULAR SCREENING; LDL GOAL LESS THAN 130: ICD-10-CM

## 2019-06-28 DIAGNOSIS — Z12.4 SCREENING FOR CERVICAL CANCER: ICD-10-CM

## 2019-06-28 DIAGNOSIS — Z15.89 MTHFR MUTATION: ICD-10-CM

## 2019-06-28 DIAGNOSIS — Z23 NEED FOR SHINGLES VACCINE: ICD-10-CM

## 2019-06-28 DIAGNOSIS — Z78.0 ASYMPTOMATIC POSTMENOPAUSAL STATUS: ICD-10-CM

## 2019-06-28 DIAGNOSIS — Z00.01 ENCOUNTER FOR ROUTINE ADULT MEDICAL EXAM WITH ABNORMAL FINDINGS: Primary | ICD-10-CM

## 2019-06-28 DIAGNOSIS — Z12.31 VISIT FOR SCREENING MAMMOGRAM: ICD-10-CM

## 2019-06-28 PROCEDURE — 87624 HPV HI-RISK TYP POOLED RSLT: CPT | Performed by: FAMILY MEDICINE

## 2019-06-28 PROCEDURE — 99396 PREV VISIT EST AGE 40-64: CPT | Performed by: FAMILY MEDICINE

## 2019-06-28 PROCEDURE — G0145 SCR C/V CYTO,THINLAYER,RESCR: HCPCS | Performed by: FAMILY MEDICINE

## 2019-06-28 ASSESSMENT — ANXIETY QUESTIONNAIRES
1. FEELING NERVOUS, ANXIOUS, OR ON EDGE: MORE THAN HALF THE DAYS
7. FEELING AFRAID AS IF SOMETHING AWFUL MIGHT HAPPEN: NOT AT ALL
IF YOU CHECKED OFF ANY PROBLEMS ON THIS QUESTIONNAIRE, HOW DIFFICULT HAVE THESE PROBLEMS MADE IT FOR YOU TO DO YOUR WORK, TAKE CARE OF THINGS AT HOME, OR GET ALONG WITH OTHER PEOPLE: SOMEWHAT DIFFICULT
3. WORRYING TOO MUCH ABOUT DIFFERENT THINGS: MORE THAN HALF THE DAYS
2. NOT BEING ABLE TO STOP OR CONTROL WORRYING: MORE THAN HALF THE DAYS
GAD7 TOTAL SCORE: 9
5. BEING SO RESTLESS THAT IT IS HARD TO SIT STILL: SEVERAL DAYS
6. BECOMING EASILY ANNOYED OR IRRITABLE: SEVERAL DAYS

## 2019-06-28 ASSESSMENT — PATIENT HEALTH QUESTIONNAIRE - PHQ9
SUM OF ALL RESPONSES TO PHQ QUESTIONS 1-9: 10
5. POOR APPETITE OR OVEREATING: SEVERAL DAYS

## 2019-06-28 ASSESSMENT — MIFFLIN-ST. JEOR: SCORE: 1299.69

## 2019-06-28 NOTE — PATIENT INSTRUCTIONS
Preventive Health Recommendations  Female Ages 40 to 49    Yearly exam:     See your health care provider every year in order to  1. Review health changes.   2. Discuss preventive care.    3. Review your medicines if your doctor prescribed any.      Get a Pap test every three years (unless you have an abnormal result and your provider advises testing more often).      If you get Pap tests with HPV test, you only need to test every 5 years, unless you have an abnormal result. You do not need a Pap test if your uterus was removed (hysterectomy) and you have not had cancer.      You should be tested each year for STDs (sexually transmitted diseases), if you're at risk.     Ask your doctor if you should have a mammogram.      Have a colonoscopy (test for colon cancer) if someone in your family has had colon cancer or polyps before age 50.       Have a cholesterol test every 5 years.       Have a diabetes test (fasting glucose) after age 45. If you are at risk for diabetes, you should have this test every 3 years.    Shots: Get a flu shot each year. Get a tetanus shot every 10 years.     Nutrition:     Eat at least 5 servings of fruits and vegetables each day.    Eat whole-grain bread, whole-wheat pasta and brown rice instead of white grains and rice.    Get adequate Calcium and Vitamin D.      Lifestyle    Exercise at least 150 minutes a week (an average of 30 minutes a day, 5 days a week). This will help you control your weight and prevent disease.    Limit alcohol to one drink per day.    No smoking.     Wear sunscreen to prevent skin cancer.    See your dentist every six months for an exam and cleaning.               Thank you for choosing [facility]  for your Health Care. It was a pleasure seeing you at your visit today. Please contact us with any questions or concerns you may have.                   Selena Barnes MD                                  To reach your [facility]  [facility] team after hours call:   [phone]    Our clinic hours are:     Monday- 7:30 am - 7:00 pm                             Tuesday through Friday- 7:30 am - 5:00 pm                                        Saturday- 8:00 am - 12:00 pm                  Phone:  [phone]    Our pharmacy hours are:     Monday  8:00 am to 7:00 pm      Tuesday through Friday 8:00am to 6:00pm                        Saturday - 9:00 am to 1:00 pm      Sunday : Closed.              Phone:  [phone]      There is also information available at our web site:  [email or web address]    If your provider ordered any lab tests and you do not receive the results within 10 business days, please call the clinic.    If you need a medication refill please contact your pharmacy.  Please allow 2 business days for your refill to be completed.    Our clinic offers telephone visits and e visits.  Please ask one of your team members to explain more.      Use [employer] (secure email communication and access to your chart) to send your primary care provider a message or make an appointment. Ask someone on your Team how to sign up for [employer].

## 2019-06-28 NOTE — PROGRESS NOTES
SUBJECTIVE:   CC: Sherry Osorio is an 48 year old woman who presents for preventive health visit.     Healthy Habits:    Do you get at least three servings of calcium containing foods daily (dairy, green leafy vegetables, etc.)? yes    Amount of exercise or daily activities, outside of work: walk and yoga    Problems taking medications regularly No    Medication side effects: No    Have you had an eye exam in the past two years? yes    Do you see a dentist twice per year? yes    Do you have sleep apnea, excessive snoring or daytime drowsiness?no      Additional concerns:  Right side of head feels foggy intermittently. Comes and goes without rhyme or reason.  Her vision has seemed foggy as well and has seen an eye doctor.  Had a skin biopsy with MOHS surgery for basal cell skin cancer in same area  2 yrs ago - still gets some pain assoc with scar.  ? Related to sleep and dehydration.  Can't really correlate.  Doesn't seem to be getting worse. Steroid injections  In the scar helped previously.  Pt will talk with her dermatologist in 8/2019 when she sees her again.   Please, call or return to clinic or go to the ER immediately if signs or symptoms worsen or fail to improve as anticipated.       Today's PHQ-2 Score:   PHQ-2 ( 1999 [employer]) 6/28/2019 3/10/2017   Q1: Little interest or pleasure in doing things 1 1   Q2: Feeling down, depressed or hopeless 1 1   PHQ-2 Score 2 2   Q1: Little interest or pleasure in doing things - Several days   Q2: Feeling down, depressed or hopeless - Several days   PHQ-2 Score - 2       Abuse: Current or Past(Physical, Sexual or Emotional)- No  Do you feel safe in your environment? Yes      Social History     Tobacco Use     Smoking status: Never Smoker     Smokeless tobacco: Never Used   Substance Use Topics     Alcohol use: Yes     Alcohol/week: 0.0 - 1.0 oz     Comment: maybe 0-1 weekly     If you drink alcohol do you typically have >3 drinks per day or >7 drinks per week? No                      Reviewed orders with patient.  Reviewed health maintenance and updated orders accordingly - Yes  BP Readings from Last 3 Encounters:   06/28/19 118/76   07/19/18 112/72   11/10/17 112/72    Wt Readings from Last 3 Encounters:   06/28/19 66.6 kg (146 lb 12.8 oz)   07/19/18 66.7 kg (147 lb)   11/10/17 64.2 kg (141 lb 9.6 oz)                  Patient Active Problem List   Diagnosis     CARDIOVASCULAR SCREENING; LDL GOAL LESS THAN 130     Advanced directives, counseling/discussion     Multiple lipomas     Sebaceous cyst- left upper forehead      Syncope and collapse     Raynaud's phenomenon - noted 10/17/2011 at first visit      Cystocele with uterine descensus- mild w/ both      Cervical pain     Family history of clotting disorder- sister with MTHFR     Dermatitis- every winter - bilateral flanks and hips     MTHFR mutation - heterozygote     Headache     Chronic neck pain     Pap smear with atypical squamous cells, cannot exclude high grade squamous intraepithelial lesion (ASC-H)     DDD (degenerative disc disease), cervical     DDD (degenerative disc disease), thoracic     Cervical spinal stenosis     Chronic constipation     Left-sided low back pain without sciatica, unspecified chronicity     Asymptomatic postmenopausal status - since 2012 - some mood swings, but scant hot flashes , no night sweats      Past Surgical History:   Procedure Laterality Date     BIOPSY  8/1995; 11/2017    needle biopsy of mass in left breast;shave biopsy of scalp     COLONOSCOPY  2/11/2016    Dr. Diaz [facility]     COLONOSCOPY N/A 2/11/2016    Procedure: COLONOSCOPY;  Surgeon: Oscar Diaz MD;  Location: [facility]     EXCISE MASS HEAD  12/11/2012    Procedure: EXCISE MASS HEAD;  Excision Left Forehead Mass, Excision Right Posterior Thigh Mass, Excision Left Posterior Thigh Mass ;  Surgeon: Felix Trivedi MD;  Location: [facility] OR     EXCISE MASS LOWER EXTREMITY  12/11/2012    Procedure: EXCISE MASS LOWER  EXTREMITY;;  Surgeon: Felix Trivedi MD;  Location: [facility] OR     wisdom teeth extraction         Social History     Tobacco Use     Smoking status: Never Smoker     Smokeless tobacco: Never Used   Substance Use Topics     Alcohol use: Yes     Alcohol/week: 0.0 - 1.0 oz     Comment: maybe 0-1 weekly     Family History   Problem Relation Age of Onset     Asthma Father      Hypertension Father      Cerebrovascular Disease Father      Prostate Cancer Father      Neurologic Disorder Father         Lewy Body dementia - broke hip-  at age 76      Cerebrovascular Disease Sister 47     Thyroid Disease Sister 68        hypothyroid      Blood Disease Sister 68        MTHFR deficiency - not factor V leiden      Cerebrovascular Disease Sister 15        secondary to MTHFR deficiency      Cancer Sister 30        Breast cancer dx'd 2012     Breast Cancer Sister      Blood Disease Sister      Cancer Sister      Depression Sister      Thyroid Disease Sister      Cerebrovascular Disease Sister      Breast Cancer Sister         just had genetic testing done 2019 - she's heterozygous MTHFR      Depression Sister      Thyroid Disease Sister      Endometrial Cancer Sister 51        Endometrioid adenocarcinoma grade 1     Other - See Comments Sister         heterozygous MTHFR      Colon Cancer Other         Maternal aunt         Current Outpatient Medications   Medication Sig Dispense Refill     aspirin 81 MG tablet Take 1 tablet (81 mg) by mouth daily 30 tablet      cyclobenzaprine (FLEXERIL) 5 MG tablet Take 1-2 tablets (5-10 mg) by mouth 3 times daily as needed for muscle spasms 40 tablet 1     triamcinolone (KENALOG) 0.1 % cream Apply topically 3 times daily as needed Apply sparingly to affected area. 80 g 5     No Known Allergies  Recent Labs   Lab Test 03/10/17  1621 16  1508 14  0953  12  1147   * 117* 122   < > 127   HDL 64 65 63   < > 62   TRIG 47 51 41   < > 49   ALT 23 25  --   --   --    CR  0.73 0.66  --   --   --    GFRESTIMATED 86 >90  Non  GFR Calc    --   --   --    GFRESTBLACK >90   GFR Calc   >90   GFR Calc    --   --   --    POTASSIUM 4.5 4.2  --   --   --    TSH 0.59  --   --   --  0.71    < > = values in this interval not displayed.      Mammogram Screening: Patient under age 50, mutual decision reflected in health maintenance.    No results found.      History of abnormal Pap smear: YES - updated in Problem List and Health Maintenance accordingly  PAP / HPV Latest Ref Rng & Units 11/10/2017 3/10/2017 1/25/2016   PAP - ASC-H(A) ASC-H(A) NIL   HPV 16 DNA NEG:Negative Negative Negative -   HPV 18 DNA NEG:Negative Negative Negative -   OTHER HR HPV NEG:Negative Negative Negative -     Reviewed and updated as needed this visit by clinical staff  Tobacco  Allergies  Meds  Problems  Med Hx  Surg Hx  Fam Hx  Soc Hx        Reviewed and updated as needed this visit by Provider  Tobacco  Allergies  Meds  Problems  Med Hx  Surg Hx  Fam Hx        Past Medical History:   Diagnosis Date     Arthritis 2010    osteoarthritis...     ASCUS favor benign 2011, 2014    neg HPV Plan cotest in 3 yrs.     Cellulitis of right leg w/ abrasion 9/4/2012     Cervicalgia      Headache      Left ankle injury 9/4/2012     MTHFR mutation (H)     heterozygote - referred to hematology     Pap smear cannot exclude high grade squamous intraepithelial lesion (ASC-H) 03/10/2017    03/10/17: ASC-H pap, Neg HR HPV result.      Puncture wound of left lower leg with complication- cellulitis 9/4/2012     Vertigo summer 2006    rehydrated over night in hosp = better       Past Surgical History:   Procedure Laterality Date     BIOPSY  8/1995; 11/2017    needle biopsy of mass in left breast;shave biopsy of scalp     COLONOSCOPY  2/11/2016    Dr. Diaz [facility]     COLONOSCOPY N/A 2/11/2016    Procedure: COLONOSCOPY;  Surgeon: Oscar Diaz MD;  [facility]: [facility]      "EXCISE MASS HEAD  2012    Procedure: EXCISE MASS HEAD;  Excision Left Forehead Mass, Excision Right Posterior Thigh Mass, Excision Left Posterior Thigh Mass ;  Surgeon: Felix Trivedi MD;  Location: [facility]     EXCISE MASS LOWER EXTREMITY  2012    Procedure: EXCISE MASS LOWER EXTREMITY;;  Surgeon: Felix Trivedi MD;  Location: [facility] OR     wisdom teeth extraction       OB History    Para Term  AB Living   4 4 4 0 0 4   SAB TAB Ectopic Multiple Live Births   0 0 0 0 0      # Outcome Date GA Lbr Clement/2nd Weight Sex Delivery Anes PTL Lv   4 Term            3 Term            2 Term            1 Term               Obstetric Comments    x 4 - no complications with any pregnancies , labors or deliveries     ROS:  CONSTITUTIONAL: NEGATIVE for fever, chills, change in weight  INTEGUMENTARU/SKIN: NEGATIVE for worrisome rashes, moles or lesions  EYES: NEGATIVE for vision changes or irritation  ENT: NEGATIVE for ear, mouth and throat problems  RESP: NEGATIVE for significant cough or SOB  BREAST: NEGATIVE for masses, tenderness or discharge  CV: NEGATIVE for chest pain, palpitations or peripheral edema  GI: NEGATIVE for nausea, abdominal pain, heartburn, or change in bowel habits  : NEGATIVE for unusual urinary or vaginal symptoms. Periods are absent.   MUSCULOSKELETAL: NEGATIVE for significant arthralgias or myalgia  NEURO: NEGATIVE for weakness, dizziness or paresthesias  ENDOCRINE: NEGATIVE for temperature intolerance, skin/hair changes  HEME/ALLERGY/IMMUNE: NEGATIVE for bleeding problems  PSYCHIATRIC: NEGATIVE for changes in mood or affect    OBJECTIVE:   /76 (BP Location: Right arm, Patient Position: Chair, Cuff Size: Adult Regular)   Pulse 103   Temp 98.7  F (37.1  C) (Oral)   Ht 1.664 m (5' 5.5\")   Wt 66.6 kg (146 lb 12.8 oz)   SpO2 99%   BMI 24.06 kg/m    EXAM:  GENERAL APPEARANCE: healthy, alert and no distress  EYES: Eyes grossly normal to inspection, PERRL and conjunctivae " and sclerae normal  HENT: ear canals and TM's normal, nose and mouth without ulcers or lesions, oropharynx clear and oral mucous membranes moist  NECK: no adenopathy, no asymmetry, masses, or scars and thyroid normal to palpation  RESP: lungs clear to auscultation - no rales, rhonchi or wheezes  BREAST: normal without masses, tenderness or nipple discharge and no palpable axillary masses or adenopathy  CV: regular rate and rhythm, normal S1 S2, no S3 or S4, no murmur, click or rub, no peripheral edema and peripheral pulses strong  ABDOMEN: soft, nontender, no hepatosplenomegaly, no masses and bowel sounds normal   (female): normal female external genitalia, normal urethral meatus, vaginal mucosal atrophy noted, normal cervix, adnexae, and uterus without masses or abnormal discharge  MS: no musculoskeletal defects are noted and gait is age appropriate without ataxia  SKIN: no suspicious lesions or rashes  NEURO: Normal strength and tone, sensory exam grossly normal, mentation intact and speech normal  PSYCH: mentation appears normal and affect normal/bright    Diagnostic Test Results:  See [facility] orders.     ASSESSMENT/PLAN:       ICD-10-CM    1. Encounter for routine adult medical exam with abnormal findings Z00.01    2. CARDIOVASCULAR SCREENING; LDL GOAL LESS THAN 130 Z13.6 Lipid panel reflex to direct LDL Fasting     Comprehensive metabolic panel     CBC with platelets     TSH with free T4 reflex   3. Screening for human immunodeficiency virus Z11.4 CANCELED: HIV Screening   4. Screening for cervical cancer Z12.4 Pap imaged thin layer screen with HPV - recommended age 30 - 65 years (select HPV order below)     HPV High Risk Types DNA Cervical   5. Visit for screening mammogram Z12.31 MA Screen Bilateral w/Christian   6. Need for shingles vaccine Z23    7. MTHFR mutation (H) E72.12    8. Asymptomatic postmenopausal status - since 2012 - some mood swings, but scant hot flashes , no night sweats  Z78.0   "      COUNSELING:   Reviewed preventive health counseling, as reflected in patient instructions    Estimated body mass index is 24.06 kg/m  as calculated from the following:    Height as of this encounter: 1.664 m (5' 5.5\").    Weight as of this encounter: 66.6 kg (146 lb 12.8 oz).         reports that she has never smoked. She has never used smokeless tobacco.      Counseling Resources:  ATP IV Guidelines  Pooled Cohorts Equation Calculator  Breast Cancer Risk Calculator  FRAX Risk Assessment  ICSI Preventive Guidelines  Dietary Guidelines for Americans, 2010  USDA's MyPlate  ASA Prophylaxis  Lung CA Screening    Selena Barnes MD  [facility]  "

## 2019-06-29 ASSESSMENT — ANXIETY QUESTIONNAIRES: GAD7 TOTAL SCORE: 9

## 2019-07-03 DIAGNOSIS — Z13.6 CARDIOVASCULAR SCREENING; LDL GOAL LESS THAN 130: ICD-10-CM

## 2019-07-03 LAB
ALBUMIN SERPL-MCNC: 3.4 G/DL (ref 3.4–5)
ALP SERPL-CCNC: 118 U/L (ref 40–150)
ALT SERPL W P-5'-P-CCNC: 20 U/L (ref 0–50)
ANION GAP SERPL CALCULATED.3IONS-SCNC: 10 MMOL/L (ref 3–14)
AST SERPL W P-5'-P-CCNC: 17 U/L (ref 0–45)
BILIRUB SERPL-MCNC: 0.4 MG/DL (ref 0.2–1.3)
BUN SERPL-MCNC: 13 MG/DL (ref 7–30)
CALCIUM SERPL-MCNC: 8.3 MG/DL (ref 8.5–10.1)
CHLORIDE SERPL-SCNC: 108 MMOL/L (ref 94–109)
CHOLEST SERPL-MCNC: 206 MG/DL
CO2 SERPL-SCNC: 25 MMOL/L (ref 20–32)
COPATH REPORT: NORMAL
CREAT SERPL-MCNC: 0.72 MG/DL (ref 0.52–1.04)
ERYTHROCYTE [DISTWIDTH] IN BLOOD BY AUTOMATED COUNT: 12.6 % (ref 10–15)
GFR SERPL CREATININE-BSD FRML MDRD: >90 ML/MIN/{1.73_M2}
GLUCOSE SERPL-MCNC: 85 MG/DL (ref 70–99)
HCT VFR BLD AUTO: 40.6 % (ref 35–47)
HDLC SERPL-MCNC: 58 MG/DL
HGB BLD-MCNC: 13.7 G/DL (ref 11.7–15.7)
LDLC SERPL CALC-MCNC: 138 MG/DL
MCH RBC QN AUTO: 29.4 PG (ref 26.5–33)
MCHC RBC AUTO-ENTMCNC: 33.7 G/DL (ref 31.5–36.5)
MCV RBC AUTO: 87 FL (ref 78–100)
NONHDLC SERPL-MCNC: 148 MG/DL
PAP: NORMAL
PLATELET # BLD AUTO: 268 10E9/L (ref 150–450)
POTASSIUM SERPL-SCNC: 4.2 MMOL/L (ref 3.4–5.3)
PROT SERPL-MCNC: 6.8 G/DL (ref 6.8–8.8)
RBC # BLD AUTO: 4.66 10E12/L (ref 3.8–5.2)
SODIUM SERPL-SCNC: 143 MMOL/L (ref 133–144)
TRIGL SERPL-MCNC: 50 MG/DL
TSH SERPL DL<=0.005 MIU/L-ACNC: 1.25 MU/L (ref 0.4–4)
WBC # BLD AUTO: 4.9 10E9/L (ref 4–11)

## 2019-07-03 PROCEDURE — 84443 ASSAY THYROID STIM HORMONE: CPT | Performed by: FAMILY MEDICINE

## 2019-07-03 PROCEDURE — 80061 LIPID PANEL: CPT | Performed by: FAMILY MEDICINE

## 2019-07-03 PROCEDURE — 36415 COLL VENOUS BLD VENIPUNCTURE: CPT | Performed by: FAMILY MEDICINE

## 2019-07-03 PROCEDURE — 80053 COMPREHEN METABOLIC PANEL: CPT | Performed by: FAMILY MEDICINE

## 2019-07-03 PROCEDURE — 85027 COMPLETE CBC AUTOMATED: CPT | Performed by: FAMILY MEDICINE

## 2019-07-08 ENCOUNTER — RESULT FOLLOW UP (OUTPATIENT)
Dept: FAMILY MEDICINE | Facility: CLINIC | Age: 49
End: 2019-07-08

## 2019-07-08 DIAGNOSIS — N87.0 MILD DYSPLASIA OF CERVIX: ICD-10-CM

## 2019-07-08 LAB
FINAL DIAGNOSIS: NORMAL
HPV HR 12 DNA CVX QL NAA+PROBE: NEGATIVE
HPV16 DNA SPEC QL NAA+PROBE: NEGATIVE
HPV18 DNA SPEC QL NAA+PROBE: NEGATIVE
SPECIMEN DESCRIPTION: NORMAL
SPECIMEN SOURCE CVX/VAG CYTO: NORMAL

## 2019-07-08 NOTE — PROGRESS NOTES
2011, 2014: Ascus pap,neg HPV Plan cotest in 3 yrs.  1/2016 Pap NIL, cotest one year.  03/10/17: ASC-H pap, Neg HR HPV result. Plan Dietrich due by 06/10/17.  4/21/17: Dietrich-CIN1. Plan: diagnostic Pap in 6 mo.  11/10/17 ASC-H pap, neg HR HPV. Refer back to ObGyn.  12/13/17: Dietrich Bx NEO I, ECC Neg for dysplasia. Plan cotest in 1 year or LEEP if the pt desires this. Pt decided to repeat a cotest in 1 year. (abstracted records)  01/03/19 Patient is lost to pap tracking follow-up.   6/28/19 NIL, Neg HPV. Plan 1 yr co-test due by 6/28/20.  7/8/19 [employer] result letter sent and has been viewed by patient. (LN)

## 2019-08-08 ENCOUNTER — TRANSFERRED RECORDS (OUTPATIENT)
Dept: HEALTH INFORMATION MANAGEMENT | Facility: CLINIC | Age: 49
End: 2019-08-08

## 2019-08-31 ENCOUNTER — TELEPHONE (OUTPATIENT)
Dept: SCHEDULING | Facility: CLINIC | Age: 49
End: 2019-08-31

## 2019-09-12 PROBLEM — M54.50 LEFT-SIDED LOW BACK PAIN WITHOUT SCIATICA, UNSPECIFIED CHRONICITY: Status: RESOLVED | Noted: 2018-07-30 | Resolved: 2019-09-12

## 2019-10-18 ENCOUNTER — HOSPITAL ENCOUNTER (OUTPATIENT)
Dept: MAMMOGRAPHY | Facility: CLINIC | Age: 49
Discharge: HOME | End: 2019-10-18
Attending: FAMILY MEDICINE | Admitting: FAMILY MEDICINE
Payer: COMMERCIAL

## 2019-10-18 DIAGNOSIS — Z12.31 VISIT FOR SCREENING MAMMOGRAM: ICD-10-CM

## 2019-10-18 PROCEDURE — 77063 BREAST TOMOSYNTHESIS BI: CPT

## 2019-10-31 ENCOUNTER — HEALTH MAINTENANCE LETTER (OUTPATIENT)
Age: 49
End: 2019-10-31

## 2020-09-03 ENCOUNTER — TRANSFERRED RECORDS (OUTPATIENT)
Dept: HEALTH INFORMATION MANAGEMENT | Facility: CLINIC | Age: 50
End: 2020-09-03

## 2020-09-08 ENCOUNTER — TELEPHONE (OUTPATIENT)
Dept: FAMILY MEDICINE | Facility: CLINIC | Age: 50
End: 2020-09-08

## 2020-09-08 NOTE — TELEPHONE ENCOUNTER
General Call:     Who is calling:  patient    Reason for Call:  Patient had sutures put in at [facility] and was told to get them removed on Thurs 9/10. Appt made with Nereyda Hazel at 11:40am.    Sherry wants to speak with a nurse regarding their sutures. States it is still super swollen and the skin is not closing. Wondering if the need to wait longer.    What are your questions or concerns:  n/a    Date of last appointment with provider: n/a    Okay to leave a detailed message:Yes at Cell number on file:    Telephone Information:   Mobile [phone]

## 2020-09-09 NOTE — TELEPHONE ENCOUNTER
Called # [phone]     Pt called and discussed finger. Pt advised to be seen for evaluation as difficult to visualize over phone. Pt did note the incision is still somewhat open, Pt stated there probably could have been a few more sutures. Pt stated cap refill is somewhat delayed but unable to tell for how long as finger is still swollen and bruised. Pt did note able to move finger and no signs of infection.     Aren Geller RN   [facility]

## 2020-09-09 NOTE — PROGRESS NOTES
SUBJECTIVE:   Sherry Osorio is a 50 year old female who presents to clinic today for the following health issues:    ED/UC Followup:    Facility:  [facility]   Date of visit: 9/3/2020  Reason for visit: Suture Removal   Current Status: Some what improved, but patient is wondering if her sutures were not enough to close the wound.            Problem list and histories reviewed & adjusted, as indicated.  Additional history: as documented    Patient Active Problem List   Diagnosis     CARDIOVASCULAR SCREENING; LDL GOAL LESS THAN 130     Advanced directives, counseling/discussion     Multiple lipomas     Sebaceous cyst- left upper forehead      Syncope and collapse     Raynaud's phenomenon - noted 10/17/2011 at first visit      Cystocele with uterine descensus- mild w/ both      Cervical pain     Family history of clotting disorder- sister with MTHFR     Dermatitis- every winter - bilateral flanks and hips     MTHFR mutation - heterozygote     Headache     Chronic neck pain     Pap smear with atypical squamous cells, cannot exclude high grade squamous intraepithelial lesion (ASC-H)     DDD (degenerative disc disease), cervical     DDD (degenerative disc disease), thoracic     Cervical spinal stenosis     Chronic constipation     Asymptomatic postmenopausal status - since 2012 - some mood swings, but scant hot flashes , no night sweats      Past Surgical History:   Procedure Laterality Date     BIOPSY  8/1995; 11/2017    needle biopsy of mass in left breast;shave biopsy of scalp     [facility]  [facility]     COLONOSCOPY N/A 2/11/2016    Procedure: COLONOSCOPY;  Surgeon: Oscar Diaz MD;  Location: [facility]     EXCISE MASS HEAD  12/11/2012    Procedure: EXCISE MASS HEAD;  Excision Left Forehead Mass, Excision Right Posterior Thigh Mass, Excision Left Posterior Thigh Mass ;  Surgeon: Felix Trivedi MD;  Location: [facility]     EXCISE MASS LOWER EXTREMITY   2012    Procedure: EXCISE MASS LOWER EXTREMITY;;  Surgeon: Felix Trivedi MD;  Location: [facility] OR     wisdom teeth extraction         Social History     Tobacco Use     Smoking status: Never Smoker     Smokeless tobacco: Never Used   Substance Use Topics     Alcohol use: Yes     Alcohol/week: 0.0 - 1.7 standard drinks     Comment: maybe 0-1 weekly     Family History   Problem Relation Age of Onset     Asthma Father      Hypertension Father      Cerebrovascular Disease Father      Prostate Cancer Father      Neurologic Disorder Father         Lewy Body dementia - broke hip-  at age 76      Cerebrovascular Disease Sister 47     Thyroid Disease Sister 68        hypothyroid      Blood Disease Sister 68        MTHFR deficiency - not factor V leiden      Cerebrovascular Disease Sister 15        secondary to MTHFR deficiency      Cancer Sister 30        Breast cancer dx'd 2012     Breast Cancer Sister      Blood Disease Sister      Cancer Sister      Depression Sister      Thyroid Disease Sister      Cerebrovascular Disease Sister      Breast Cancer Sister         just had genetic testing done 2019 - she's heterozygous MTHFR      Depression Sister      Thyroid Disease Sister      Endometrial Cancer Sister 51        Endometrioid adenocarcinoma grade 1     Other - See Comments Sister         heterozygous MTHFR      Colon Cancer Other         Maternal aunt         Current Outpatient Medications   Medication Sig Dispense Refill     aspirin 81 MG tablet Take 1 tablet (81 mg) by mouth daily 30 tablet      cefPROZIL (CEFZIL) 500 MG tablet        cyclobenzaprine (FLEXERIL) 5 MG tablet Take 1-2 tablets (5-10 mg) by mouth 3 times daily as needed for muscle spasms 40 tablet 1     triamcinolone (KENALOG) 0.1 % cream Apply topically 3 times daily as needed Apply sparingly to affected area. 80 g 5     VITAMIN D PO        No Known Allergies    Reviewed and updated as needed this visit by clinical staff  Tobacco   "Allergies  Meds  Problems  Med Hx  Surg Hx  Fam Hx  Soc Hx        Reviewed and updated as needed this visit by Provider  Tobacco  Allergies  Meds  Problems  Med Hx  Surg Hx  Fam Hx         ROS:  Constitutional, HEENT, cardiovascular, pulmonary, GI, , musculoskeletal, neuro, skin, endocrine and psych systems are negative, except as otherwise noted.    OBJECTIVE:   /72 (BP Location: Right arm, Cuff Size: Adult Regular)   Pulse 87   Temp 98.7  F (37.1  C) (Tympanic)   Ht 1.664 m (5' 5.5\")   Wt 71.2 kg (157 lb)   SpO2 99%   BMI 25.73 kg/m   Body mass index is 25.73 kg/m .      GENERAL: healthy, alert and no distress  EYES: Eyes grossly normal to inspection  MS: no gross musculoskeletal defects noted, no edema  SKIN: 5 nylon sutures removed without difficulty.  well healing laceration on the volar aspect of the 3rd finger of the left hand, some mottling of skin and area where skin is missing.  In these regions early granulation tissue is noted.  No evidence for infection or dehiscence.    NEURO: Normal strength and tone, mentation intact and speech normal  PSYCH: mentation appears normal, affect normal/bright    Diagnostic Test Results:  none   ASSESSMENT/PLAN:   Sherry was seen today for er f/u.    Diagnoses and all orders for this visit:    Visit for suture removal  Sutures removed and wound care discussed in detail.  Complete antibiotic as well.      There are no Patient Instructions on file for this visit.    No follow-ups on file.     [address] [address]  [email or web address]   Office: [phone]           Nereyda Hazel, MS, PA-C      "

## 2020-09-10 ENCOUNTER — OFFICE VISIT (OUTPATIENT)
Dept: FAMILY MEDICINE | Facility: CLINIC | Age: 50
End: 2020-09-10
Payer: COMMERCIAL

## 2020-09-10 VITALS
DIASTOLIC BLOOD PRESSURE: 72 MMHG | OXYGEN SATURATION: 99 % | HEART RATE: 87 BPM | HEIGHT: 66 IN | SYSTOLIC BLOOD PRESSURE: 110 MMHG | BODY MASS INDEX: 25.23 KG/M2 | TEMPERATURE: 98.7 F | WEIGHT: 157 LBS

## 2020-09-10 DIAGNOSIS — Z48.02 VISIT FOR SUTURE REMOVAL: Primary | ICD-10-CM

## 2020-09-10 PROCEDURE — 99212 OFFICE O/P EST SF 10 MIN: CPT | Performed by: PHYSICIAN ASSISTANT

## 2020-09-10 RX ORDER — CEFPROZIL 500 MG/1
TABLET, FILM COATED ORAL
COMMUNITY
Start: 2020-09-03 | End: 2024-07-09

## 2020-09-10 ASSESSMENT — MIFFLIN-ST. JEOR: SCORE: 1335.96

## 2020-11-07 ENCOUNTER — HEALTH MAINTENANCE LETTER (OUTPATIENT)
Age: 50
End: 2020-11-07

## 2020-11-15 ENCOUNTER — VIRTUAL VISIT (OUTPATIENT)
Dept: FAMILY MEDICINE | Facility: OTHER | Age: 50
End: 2020-11-15

## 2020-11-15 DIAGNOSIS — Z20.822 SUSPECTED COVID-19 VIRUS INFECTION: Primary | ICD-10-CM

## 2020-11-15 NOTE — PROGRESS NOTES
"Date: 11/15/2020 10:13:24  Clinician: Elliot Nolan  Clinician NPI: [id number]  Patient: Sherry Osorio  Patient : 1970  Patient Address: [address] [place] [address]  Patient Phone: [phone]  Visit Protocol: URI  Patient Summary:  Sherry is a 50 year old ( : 1970 ) female who initiated a [facility] Visit for COVID-19 (Coronavirus) evaluation and screening. When asked the question \"Please sign me up to receive news, health information and promotions. \", Sherry responded \"Yes\".    Sherry states her symptoms started 1-2 days ago.   Her symptoms consist of facial pain or pressure, myalgia, tooth pain, and a cough.   Symptom details     Cough: Sherry coughs a few times an hour and her cough is not more bothersome at night. Phlegm does not come into her throat when she coughs. She believes her cough is caused by post-nasal drip.     Facial pain or pressure: The facial pain or pressure feels worse when bending over or leaning forward.     Tooth pain: The tooth pain is not caused by a cavity, recent dental work, or other mouth problems.      Sherry denies having vomiting, rhinitis, chills, malaise, sore throat, ageusia, diarrhea, ear pain, headache, wheezing, fever, nasal congestion, nausea, and anosmia. She also denies taking antibiotic medication in the past month and having recent facial or sinus surgery in the past 60 days. She is not experiencing dyspnea.   Precipitating events  She has recently been exposed to someone with influenza. Sherry has not been in close contact with any high risk individuals.   Pertinent COVID-19 (Coronavirus) information  Sherry does not work or volunteer as healthcare worker or a . In the past 14 days, Sherry has not worked or volunteered at a healthcare facility or group living setting.   In the past 14 days, she also has not lived in a congregate living setting.   Sherry has had a close contact with a laboratory-confirmed COVID-19 patient " within 14 days of symptom onset. She was not exposed at her work. Date Sherry was exposed to the laboratory-confirmed COVID-19 patient: 11/10/2020   Additional information about contact with COVID-19 (Coronavirus) patient as reported by the patient (free text): my son started symptoms on 11/10 and tested 11/12  results came back positive today    Since December 2019, Sherry has not been tested for COVID-19 and has had upper respiratory infection (URI) or influenza-like illness.      Date(s) of previous URI or influenza-like illness (free-text): January 9,2020     Symptoms Sherry experienced during previous URI or influenza-like illness as reported by the patient (free-text): complete body aches, fever, fatigue, headache        Pertinent medical history  Sherry does not get yeast infections when she takes antibiotics.   Sherry does not need a return to work/school note.   Weight: 150 lbs   Sherry does not smoke or use smokeless tobacco.   Weight: 150 lbs    MEDICATIONS: Tylenol Arthritis Pain oral, cyclobenzaprine oral, [place] Calcium-Vitamin D3 oral, aspirin-dipyridamole oral, ALLERGIES: NKDA  Clinician Response:  Dear Sherry,   Your symptoms show that you may have coronavirus (COVID-19). This illness can cause fever, cough and trouble breathing. Many people get a mild case and get better on their own. Some people can get very sick.  What should I do?  We would like to test you for this virus.   1. Please call [phone] to schedule your visit. Explain that you were referred by [facility] to have a COVID-19 test. Be ready to share your [facility] visit ID number.  * If you need to schedule in [facility] please call [phone] or for [employer] employees please call [phone].  * If you need to schedule in the [place] area please call [phone]. [employer] employees call [phone].  The following will serve as your written order for this COVID Test, ordered by me, for the indication of suspected COVID  "[Z20.828]: The test will be ordered in [employer], our electronic health record, after you are scheduled. It will show as ordered and authorized by Colten Saeed MD.  Order: COVID-19 (Coronavirus) PCR for SYMPTOMATIC testing from [facility].   2. When it's time for your COVID test:  Stay at least 6 feet away from others. (If someone will drive you to your test, stay in the backseat, as far away from the  as you can.)   Cover your mouth and nose with a mask, tissue or washcloth.  Go straight to the testing site. Don't make any stops on the way there or back.      3.Starting now: Stay home and away from others (self-isolate) until:   You've had no fever---and no medicine that reduces fever---for one full day (24 hours). And...   Your other symptoms have gotten better. For example, your cough or breathing has improved. And...   At least 10 days have passed since your symptoms started.       During this time, don't leave the house except for testing or medical care.   Stay in your own room, even for meals. Use your own bathroom if you can.   Stay away from others in your home. No hugging, kissing or shaking hands. No visitors.  Don't go to work, school or anywhere else.    Clean \"high touch\" surfaces often (doorknobs, counters, handles, etc.). Use a household cleaning spray or wipes. You'll find a full list of  on the EPA website: www.epa.gov/pesticide-registration/list-n-disinfectants-use-against-sars-cov-2.   Cover your mouth and nose with a mask, tissue or washcloth to avoid spreading germs.  Wash your hands and face often. Use soap and water.  Caregivers in these groups are at risk for severe illness due to COVID-19:  o People 65 years and older  o People who live in a nursing home or long-term care facility  o People with chronic disease (lung, heart, cancer, diabetes, kidney, liver, immunologic)  o People who have a weakened immune system, including those who:   Are in cancer treatment  Take medicine that " weakens the immune system, such as corticosteroids  Had a bone marrow or organ transplant  Have an immune deficiency  Have poorly controlled HIV or AIDS  Are obese (body mass index of 40 or higher)  Smoke regularly   o Caregivers should wear gloves while washing dishes, handling laundry and cleaning bedrooms and bathrooms.  o Use caution when washing and drying laundry: Don't shake dirty laundry, and use the warmest water setting that you can.  o For more tips, go to www.cdc.gov/coronavirus/2019-ncov/downloads/10Things.pdf.    4.Sign up for [employer]. We know it's scary to hear that you might have COVID-19. We want to track your symptoms to make sure you're okay over the next 2 weeks. Please look for an email from [email or web address]---this is a free, online program that we'll use to keep in touch. To sign up, follow the link in the email. Learn more at [email or web address]  How can I take care of myself?   Get lots of rest. Drink extra fluids (unless a doctor has told you not to).   Take Tylenol (acetaminophen) for fever or pain. If you have liver or kidney problems, ask your family doctor if it's okay to take Tylenol.   Adults can take either:    650 mg (two 325 mg pills) every 4 to 6 hours, or...   1,000 mg (two 500 mg pills) every 8 hours as needed.    Note: Don't take more than 3,000 mg in one day. Acetaminophen is found in many medicines (both prescribed and over-the-counter medicines). Read all labels to be sure you don't take too much.   For children, check the Tylenol bottle for the right dose. The dose is based on the child's age or weight.    If you have other health problems (like cancer, heart failure, an organ transplant or severe kidney disease): Call your specialty clinic if you don't feel better in the next 2 days.       Know when to call 911. Emergency warning signs include:    Trouble breathing or shortness of breath Pain or pressure in the chest that doesn't go away Feeling confused like  you haven't felt before, or not being able to wake up Bluish-colored lips or face.  Where can I get more information?   [facility] -- About COVID-19: [email or web address]   CDC -- What to Do If You're Sick: www.cdc.gov/coronavirus/2019-ncov/about/steps-when-sick.html   CDC -- Ending Home Isolation: www.cdc.gov/coronavirus/2019-ncov/hcp/disposition-in-home-patients.html   [facility] -- Caring for Someone: www.cdc.gov/coronavirus/2019-ncov/if-you-are-sick/care-for-someone.html   [facility] -- Interim Guidance for Hospital Discharge to Home: www.[facility].mn.us/diseases/coronavirus/hcp/hospdischarge.pdf   [facility] clinical trials (COVID-19 research studies): clinicalaffairs.[facility]-clinical-trials    Below are the COVID-19 hotlines at the [facility] of Health ([facility]). Interpreters are available.    For health questions: Call [phone] or [phone] (7 a.m. to 7 p.m.) For questions about schools and childcare: Call [phone] or [phone] (7 a.m. to 7 p.m.)    Diagnosis: Cough  Diagnosis ICD: R05

## 2020-11-16 DIAGNOSIS — Z20.822 SUSPECTED COVID-19 VIRUS INFECTION: ICD-10-CM

## 2020-11-16 PROCEDURE — U0003 INFECTIOUS AGENT DETECTION BY NUCLEIC ACID (DNA OR RNA); SEVERE ACUTE RESPIRATORY SYNDROME CORONAVIRUS 2 (SARS-COV-2) (CORONAVIRUS DISEASE [COVID-19]), AMPLIFIED PROBE TECHNIQUE, MAKING USE OF HIGH THROUGHPUT TECHNOLOGIES AS DESCRIBED BY CMS-2020-01-R: HCPCS | Performed by: FAMILY MEDICINE

## 2020-11-18 LAB
SARS-COV-2 RNA SPEC QL NAA+PROBE: ABNORMAL
SPECIMEN SOURCE: ABNORMAL

## 2020-12-16 ENCOUNTER — OFFICE VISIT (OUTPATIENT)
Dept: FAMILY MEDICINE | Facility: CLINIC | Age: 50
End: 2020-12-16
Payer: COMMERCIAL

## 2020-12-16 VITALS
HEIGHT: 66 IN | SYSTOLIC BLOOD PRESSURE: 110 MMHG | DIASTOLIC BLOOD PRESSURE: 82 MMHG | OXYGEN SATURATION: 95 % | BODY MASS INDEX: 25.07 KG/M2 | HEART RATE: 89 BPM | WEIGHT: 156 LBS | TEMPERATURE: 97.7 F

## 2020-12-16 DIAGNOSIS — Z00.01 ENCOUNTER FOR ROUTINE ADULT MEDICAL EXAM WITH ABNORMAL FINDINGS: Primary | ICD-10-CM

## 2020-12-16 DIAGNOSIS — M54.50 ACUTE LEFT-SIDED LOW BACK PAIN WITHOUT SCIATICA: ICD-10-CM

## 2020-12-16 DIAGNOSIS — Z12.4 SCREENING FOR MALIGNANT NEOPLASM OF CERVIX: ICD-10-CM

## 2020-12-16 DIAGNOSIS — E78.5 HYPERLIPIDEMIA LDL GOAL <130: ICD-10-CM

## 2020-12-16 DIAGNOSIS — Z23 NEED FOR SHINGLES VACCINE: ICD-10-CM

## 2020-12-16 DIAGNOSIS — H04.123 DRY EYES: ICD-10-CM

## 2020-12-16 DIAGNOSIS — Z78.0 ASYMPTOMATIC POSTMENOPAUSAL STATUS: ICD-10-CM

## 2020-12-16 DIAGNOSIS — Z13.6 CARDIOVASCULAR SCREENING; LDL GOAL LESS THAN 130: ICD-10-CM

## 2020-12-16 DIAGNOSIS — Z13.220 SCREENING FOR HYPERLIPIDEMIA: ICD-10-CM

## 2020-12-16 DIAGNOSIS — U07.1 INFECTION DUE TO 2019 NOVEL CORONAVIRUS: ICD-10-CM

## 2020-12-16 DIAGNOSIS — Z12.31 VISIT FOR SCREENING MAMMOGRAM: ICD-10-CM

## 2020-12-16 DIAGNOSIS — Z23 NEEDS FLU SHOT: ICD-10-CM

## 2020-12-16 PROCEDURE — 87624 HPV HI-RISK TYP POOLED RSLT: CPT | Performed by: FAMILY MEDICINE

## 2020-12-16 PROCEDURE — 99396 PREV VISIT EST AGE 40-64: CPT | Mod: 25 | Performed by: FAMILY MEDICINE

## 2020-12-16 PROCEDURE — 90682 RIV4 VACC RECOMBINANT DNA IM: CPT | Performed by: FAMILY MEDICINE

## 2020-12-16 PROCEDURE — G0145 SCR C/V CYTO,THINLAYER,RESCR: HCPCS | Performed by: FAMILY MEDICINE

## 2020-12-16 PROCEDURE — G0124 SCREEN C/V THIN LAYER BY MD: HCPCS

## 2020-12-16 PROCEDURE — 90471 IMMUNIZATION ADMIN: CPT | Performed by: FAMILY MEDICINE

## 2020-12-16 RX ORDER — CYCLOBENZAPRINE HCL 5 MG
5-10 TABLET ORAL 3 TIMES DAILY PRN
Qty: 30 TABLET | Refills: 1 | Status: SHIPPED | OUTPATIENT
Start: 2020-12-16 | End: 2022-03-02

## 2020-12-16 SDOH — ECONOMIC STABILITY: FOOD INSECURITY: HOW HARD IS IT FOR YOU TO PAY FOR THE VERY BASICS LIKE FOOD, HOUSING, MEDICAL CARE, AND HEATING?: NOT ASKED

## 2020-12-16 SDOH — ECONOMIC STABILITY: FOOD INSECURITY: WITHIN THE PAST 12 MONTHS, YOU WORRIED THAT YOUR FOOD WOULD RUN OUT BEFORE YOU GOT THE MONEY TO BUY MORE.: NOT ASKED

## 2020-12-16 SDOH — ECONOMIC STABILITY: FOOD INSECURITY: WITHIN THE PAST 12 MONTHS, THE FOOD YOU BOUGHT JUST DIDN'T LAST AND YOU DIDN'T HAVE MONEY TO GET MORE.: NOT ASKED

## 2020-12-16 SDOH — ECONOMIC STABILITY: TRANSPORTATION INSECURITY
IN THE PAST 12 MONTHS, HAS LACK OF TRANSPORTATION KEPT YOU FROM MEDICAL APPOINTMENTS OR FROM GETTING MEDICATIONS?: NOT ASKED

## 2020-12-16 ASSESSMENT — MIFFLIN-ST. JEOR: SCORE: 1331.42

## 2020-12-16 ASSESSMENT — ACTIVITIES OF DAILY LIVING (ADL): LACK_OF_TRANSPORTATION: NOT ASKED

## 2020-12-16 NOTE — PROGRESS NOTES
SUBJECTIVE:   CC: Sherry Osorio is an 50 year old woman who presents for preventive health visit and the following other medical problems:      1. Encounter for routine adult medical exam with abnormal findings    2. Infection due to 2019 novel coronavirus-in November 2020- mainly sinus pain and fatigue     3. Needs flu shot    4. CARDIOVASCULAR SCREENING; LDL GOAL LESS THAN 130    5. Asymptomatic postmenopausal status - since 2012 - some mood swings, but scant hot flashes , no night sweats     6. Acute left-sided low back pain without sciatica- intermittent recurrences - not right now, but desires cyclobenzaprine rx on hold     7. Dry eyes- sometimes increased watering and sometimes a bit painful in the am's    8. Visit for screening mammogram    9. Screening for malignant neoplasm of cervix    10. Screening for hyperlipidemia    11. Need for shingles vaccine    12. Hyperlipidemia LDL goal <130    getting her eyes checked yearly by optometry , but gets some dry eye - gets a burning milky white discharge that gets clogged in nasal lacrimal ducts at times.       Patient has been advised of split billing requirements and indicates understanding: Yes     Healthy Habits:    Getting at least 3 servings of Calcium per day:  Yes    Bi-annual eye exam:  Yes    Dental care twice a year:  Yes    Sleep apnea or symptoms of sleep apnea:  None    Diet:  Regular (no restrictions)    Frequency of exercise:  6-7 days/week    Duration of exercise:  45-60 minutes    Barriers to taking medications:  None    Medication side effects:  None    PHQ-2 Total Score:    Additional concerns today:  No       Today's PHQ-2 Score:   PHQ-2 ( 1999 Pfizer) 12/16/2020   Q1: Little interest or pleasure in doing things 0   Q2: Feeling down, depressed or hopeless 0   PHQ-2 Score 0   Q1: Little interest or pleasure in doing things -   Q2: Feeling down, depressed or hopeless -   PHQ-2 Score -       Abuse: Current or Past (Physical, Sexual or  Emotional) - No  Do you feel safe in your environment? YES        Social History     Tobacco Use     Smoking status: Never Smoker     Smokeless tobacco: Never Used   Substance Use Topics     Alcohol use: Yes     Alcohol/week: 0.0 - 1.7 standard drinks     Comment: maybe 0-1 weekly     If you drink alcohol do you typically have >3 drinks per day or >7 drinks per week? No    Alcohol Use 12/16/2020   Prescreen: >3 drinks/day or >7 drinks/week? No       Reviewed orders with patient.  Reviewed health maintenance and updated orders accordingly - Yes  Lab work is in process  Labs reviewed in EPIC  BP Readings from Last 3 Encounters:   12/16/20 110/82   09/10/20 110/72   06/28/19 118/76    Wt Readings from Last 3 Encounters:   12/16/20 70.8 kg (156 lb)   09/10/20 71.2 kg (157 lb)   06/28/19 66.6 kg (146 lb 12.8 oz)                  Patient Active Problem List   Diagnosis     Advanced directives, counseling/discussion     Multiple lipomas     Sebaceous cyst- left upper forehead      Syncope and collapse     Raynaud's phenomenon - noted 10/17/2011 at first visit      Cystocele with uterine descensus- mild w/ both      Cervical pain     Family history of clotting disorder- sister with MTHFR     Dermatitis- every winter - bilateral flanks and hips     MTHFR mutation - heterozygote     Headache     Chronic neck pain     Mild dysplasia of cervix     DDD (degenerative disc disease), cervical     DDD (degenerative disc disease), thoracic     Cervical spinal stenosis     Chronic constipation     Acute left-sided low back pain without sciatica     Asymptomatic postmenopausal status - since 2012 - some mood swings, but scant hot flashes , no night sweats      Dry eyes- sometimes increased watering and sometimes a bit painful in the am's     Hyperlipidemia LDL goal <130     Past Surgical History:   Procedure Laterality Date     BIOPSY  8/1995; 11/2017    needle biopsy of mass in left breast;shave biopsy of scalp     COLONOSCOPY   2016    Dr. Diaz [facility]     COLONOSCOPY N/A 2016    Procedure: COLONOSCOPY;  Surgeon: Oscar Diaz MD;  Location: [facility]     EXCISE MASS HEAD  2012    Procedure: EXCISE MASS HEAD;  Excision Left Forehead Mass, Excision Right Posterior Thigh Mass, Excision Left Posterior Thigh Mass ;  Surgeon: Felix Trivedi MD;  Location: [facility] OR     EXCISE MASS LOWER EXTREMITY  2012    Procedure: EXCISE MASS LOWER EXTREMITY;;  Surgeon: Felix Trivedi MD;  Location: [facility] OR     wisdom teeth extraction         Social History     Tobacco Use     Smoking status: Never Smoker     Smokeless tobacco: Never Used   Substance Use Topics     Alcohol use: Yes     Alcohol/week: 0.0 - 1.7 standard drinks     Comment: maybe 0-1 weekly     Family History   Problem Relation Age of Onset     Asthma Father      Hypertension Father      Cerebrovascular Disease Father      Prostate Cancer Father      Neurologic Disorder Father         Lewy Body dementia - broke hip-  at age 76      Cerebrovascular Disease Sister 47     Thyroid Disease Sister 68        hypothyroid      Blood Disease Sister 68        MTHFR deficiency - not factor V leiden      Cerebrovascular Disease Sister 15        secondary to MTHFR deficiency      Cancer Sister 30        Breast cancer dx'd 2012     Breast Cancer Sister      Blood Disease Sister      Cancer Sister      Depression Sister      Thyroid Disease Sister      Cerebrovascular Disease Sister      Breast Cancer Sister         just had genetic testing done 2019 - she's heterozygous MTHFR      Depression Sister      Thyroid Disease Sister      Endometrial Cancer Sister 51        Endometrioid adenocarcinoma grade 1     Other - See Comments Sister         heterozygous MTHFR      Colon Cancer Other         Maternal aunt         Current Outpatient Medications   Medication Sig Dispense Refill     aspirin 81 MG tablet Take 1 tablet (81 mg) by mouth daily 30 tablet      cefPROZIL (CEFZIL)  500 MG tablet        cyclobenzaprine (FLEXERIL) 5 MG tablet Take 1-2 tablets (5-10 mg) by mouth 3 times daily as needed for muscle spasms 30 tablet 1     triamcinolone (KENALOG) 0.1 % cream Apply topically 3 times daily as needed Apply sparingly to affected area. 80 g 5     VITAMIN D PO        No Known Allergies  Recent Labs   Lab Test 20  1012 19  0828 03/10/17  1621   * 138* 130*   HDL 63 58 64   TRIG 95 50 47   ALT 27 20 23   CR 0.70 0.72 0.73   GFRESTIMATED >90 >90 86   GFRESTBLACK >90 >90 >90  African American GFR Calc     POTASSIUM 4.3 4.2 4.5   TSH 1.11 1.25 0.59        Mammogram Screening: Patient over age 50, mutual decision to screen reflected in health maintenance.    Pertinent mammograms are reviewed under the imaging tab.  History of abnormal Pap smear: NO - age 30- 65 PAP every 3 years recommended  PAP / HPV Latest Ref Rng & Units 2020 2019 11/10/2017   PAP - ASC-US(A) NIL ASC-H(A)   HPV 16 DNA NEG:Negative Negative Negative Negative   HPV 18 DNA NEG:Negative Negative Negative Negative   OTHER HR HPV NEG:Negative Negative Negative Negative     Reviewed and updated as needed this visit by clinical staff  Tobacco  Allergies  Meds  Problems  Med Hx  Surg Hx  Fam Hx          Reviewed and updated as needed this visit by Provider     Problems   Surg Hx          OB History    Para Term  AB Living   4 4 4 0 0 4   SAB TAB Ectopic Multiple Live Births   0 0 0 0 0      # Outcome Date GA Lbr Clement/2nd Weight Sex Delivery Anes PTL Lv   4 Term            3 Term            2 Term            1 Term               Obstetric Comments    x 4 - no complications with any pregnancies , labors or deliveries       Review of Systems  CONSTITUTIONAL: NEGATIVE for fever, chills, change in weight  INTEGUMENTARY/SKIN: NEGATIVE for worrisome rashes, moles or lesions  EYES: NEGATIVE for vision changes or irritation  ENT: NEGATIVE for ear, mouth and throat problems  RESP: NEGATIVE  "for significant cough or SOB  BREAST: NEGATIVE for masses, tenderness or discharge  CV: NEGATIVE for chest pain, palpitations or peripheral edema  GI: NEGATIVE for nausea, abdominal pain, heartburn, or change in bowel habits  : NEGATIVE for unusual urinary or vaginal symptoms. No vaginal bleeding.  MUSCULOSKELETAL: NEGATIVE for significant arthralgias or myalgia  NEURO: NEGATIVE for weakness, dizziness or paresthesias  PSYCHIATRIC: NEGATIVE for changes in mood or affect      OBJECTIVE:   /82   Pulse 89   Temp 97.7  F (36.5  C)   Ht 1.664 m (5' 5.5\")   Wt 70.8 kg (156 lb)   SpO2 95%   BMI 25.56 kg/m    Physical Exam  GENERAL APPEARANCE: healthy, alert and no distress  EYES: Eyes grossly normal to inspection, PERRL and conjunctivae and sclerae normal  HENT: ear canals and TM's normal, nose and mouth without ulcers or lesions, oropharynx clear and oral mucous membranes moist  NECK: no adenopathy, no asymmetry, masses, or scars and thyroid normal to palpation  RESP: lungs clear to auscultation - no rales, rhonchi or wheezes  BREAST: normal without masses, tenderness or nipple discharge and no palpable axillary masses or adenopathy  CV: regular rate and rhythm, normal S1 S2, no S3 or S4, no murmur, click or rub, no peripheral edema and peripheral pulses strong  ABDOMEN: soft, nontender, no hepatosplenomegaly, no masses and bowel sounds normal   (female): normal female external genitalia, normal urethral meatus, vaginal mucosal atrophy noted, normal cervix, adnexae, and uterus without masses or abnormal discharge  MS: no musculoskeletal defects are noted and gait is age appropriate without ataxia  SKIN: no suspicious lesions or rashes  NEURO: Normal strength and tone, sensory exam grossly normal, mentation intact and speech normal  PSYCH: mentation appears normal and affect normal/bright    Diagnostic Test Results:  Labs reviewed in Epic    ASSESSMENT/PLAN:       ICD-10-CM    1. Encounter for routine adult " "medical exam with abnormal findings  Z00.01 REVIEW OF HEALTH MAINTENANCE PROTOCOL ORDERS   2. Infection due to 2019 novel coronavirus-in November 2020- mainly sinus pain and fatigue   U07.1 OFFICE/OUTPT VISIT,EST,LEVL III   3. Needs flu shot  Z23 ADMIN 1st VACCINE   4. CARDIOVASCULAR SCREENING; LDL GOAL LESS THAN 130  Z13.6 Lipid panel reflex to direct LDL Fasting     CBC with platelets and differential     Comprehensive metabolic panel (BMP + Alb, Alk Phos, ALT, AST, Total. Bili, TP)     TSH with free T4 reflex   5. Asymptomatic postmenopausal status - since 2012 - some mood swings, but scant hot flashes , no night sweats   Z78.0 DX Hip/Pelvis/Spine   6. Acute left-sided low back pain without sciatica- intermittent recurrences - not right now, but desires cyclobenzaprine rx on hold   M54.5 cyclobenzaprine (FLEXERIL) 5 MG tablet     OFFICE/OUTPT VISIT,EST,LEVL III   7. Dry eyes- sometimes increased watering and sometimes a bit painful in the am's  H04.123 EYE ADULT REFERRAL     OFFICE/OUTPT VISIT,EST,LEVL III   8. Visit for screening mammogram  Z12.31 MA Screen Bilateral w/Christian   9. Screening for malignant neoplasm of cervix  Z12.4 Pap imaged thin layer screen with HPV - recommended age 30 - 65 years (select HPV order below)     HPV High Risk Types DNA Cervical   10. Screening for hyperlipidemia  Z13.220    11. Need for shingles vaccine  Z23 zoster vaccine recombinant adjuvanted (SHINGRIX) injection   12. Hyperlipidemia LDL goal <130  E78.5 Lipid panel reflex to direct LDL Fasting     AST     ALT       Patient has been advised of split billing requirements and indicates understanding: Yes  COUNSELING:  Reviewed preventive health counseling, as reflected in patient instructions    Estimated body mass index is 25.56 kg/m  as calculated from the following:    Height as of this encounter: 1.664 m (5' 5.5\").    Weight as of this encounter: 70.8 kg (156 lb).    Weight management plan: Discussed healthy diet and exercise " guidelines    She reports that she has never smoked. She has never used smokeless tobacco.      Counseling Resources:  ATP IV Guidelines  Pooled Cohorts Equation Calculator  Breast Cancer Risk Calculator  BRCA-Related Cancer Risk Assessment: FHS-7 Tool  FRAX Risk Assessment  ICSI Preventive Guidelines  Dietary Guidelines for Americans, 2010  USDA's MyPlate  ASA Prophylaxis  Lung CA Screening    Selena Barnes MD  [facility]

## 2020-12-16 NOTE — PATIENT INSTRUCTIONS
[facility]  [address] [place] [id number]  Office: [phone]   Fax:    [phone]         Preventive Health Recommendations  Female Ages 50 - 64    Yearly exam: See your health care provider every year in order to  o Review health changes.   o Discuss preventive care.    o Review your medicines if your doctor has prescribed any.      Get a Pap test every three years (unless you have an abnormal result and your provider advises testing more often).    If you get Pap tests with HPV test, you only need to test every 5 years, unless you have an abnormal result.     You do not need a Pap test if your uterus was removed (hysterectomy) and you have not had cancer.    You should be tested each year for STDs (sexually transmitted diseases) if you're at risk.     Have a mammogram every 1 to 2 years.    Have a colonoscopy at age 50, or have a yearly FIT test (stool test). These exams screen for colon cancer.      Have a cholesterol test every 5 years, or more often if advised.    Have a diabetes test (fasting glucose) every three years. If you are at risk for diabetes, you should have this test more often.     If you are at risk for osteoporosis (brittle bone disease), think about having a bone density scan (DEXA).    Shots: Get a flu shot each year. Get a tetanus shot every 10 years.    Nutrition:     Eat at least 5 servings of fruits and vegetables each day.    Eat whole-grain bread, whole-wheat pasta and brown rice instead of white grains and rice.    Get adequate Calcium and Vitamin D.     Lifestyle    Exercise at least 150 minutes a week (30 minutes a day, 5 days a week). This will help you control your weight and prevent disease.    Limit alcohol to one drink per day.    No smoking.     Wear sunscreen to prevent skin cancer.     See your dentist every six months for an exam and cleaning.    See your eye doctor every 1 to 2 years.    Thank you so much or choosing [facility]  for your Health Care. It was a pleasure seeing you at your visit today! Please contact us with any questions or concerns you may have.                   Selena Barnes MD                              To reach your [facility] care team after hours call:   [phone]    PLEASE NOTE OUR HOURS HAVE CHANGED secondary to COVID-19 coronavirus pandemic, as we are trying to minimize patient exposure to the virus,  which is now widespread in the [facility].  These hours may change with very little notice.  We apologize for any inconvenience.       Our current clinic hours are:          Monday- Thursday   7:00am - 6:00pm  in person.      Friday  7:00am- 5:00pm                       Saturday and Sunday : Closed to in person and virtual visits        We have telephone and virtual visit times available between    7:00am - 6pm on Monday-Friday as well.                                                Phone:  [phone]      Our pharmacy hours:   Monday  9:00 am to 6:00 pm      Tuesday through Friday 9:00am to 5:00pm                        Saturday - 9:00 am to 12 noon       Sunday : Closed.              Phone:  [phone]    ###  Please note: at this time we are not accepting any walk-in visits. ###      There is also information available at our web site:  [email or web address]    If your provider ordered any lab tests and you do not receive the results within 10 business days, please call the clinic.    If you need a medication refill please contact your pharmacy.  Please allow 2 business days for your refill to be completed.    Our clinic offers telephone visits and e visits.  Please ask one of your team members to explain more.      Use [employer] (secure email communication and access to your chart) to send your primary care provider a message or make an appointment. Ask someone on your Team how to sign up for [employer].

## 2020-12-22 ENCOUNTER — PATIENT OUTREACH (OUTPATIENT)
Dept: FAMILY MEDICINE | Facility: CLINIC | Age: 50
End: 2020-12-22

## 2020-12-22 DIAGNOSIS — N87.0 MILD DYSPLASIA OF CERVIX: ICD-10-CM

## 2020-12-22 LAB
COPATH REPORT: ABNORMAL
PAP: ABNORMAL

## 2020-12-22 NOTE — TELEPHONE ENCOUNTER
2011, 2014: [facility] pap,neg HPV Plan cotest in 3 yrs.  1/2016 Pap NIL, cotest one year.  03/10/17: [facility] pap, Neg HR HPV result. Plan [place] due by 06/10/17.  4/21/17: [place]-CIN1. Plan: diagnostic Pap in 6 mo.  11/10/17 [facility] pap, neg HR HPV. Refer back to ObGyn.  12/13/17: [place] Bx NEO I, ECC Neg for dysplasia. Plan cotest in 1 year or LEEP if the pt desires this. Pt decided to repeat a cotest in 1 year. (abstracted records)  01/03/19 Patient is lost to pap tracking follow-up.   6/28/19 NIL, Neg HPV. Plan 1 yr co-test.   12/16/20 Pap/HPV - in process

## 2020-12-28 ENCOUNTER — PATIENT OUTREACH (OUTPATIENT)
Dept: FAMILY MEDICINE | Facility: CLINIC | Age: 50
End: 2020-12-28

## 2020-12-28 DIAGNOSIS — N87.0 MILD DYSPLASIA OF CERVIX: ICD-10-CM

## 2020-12-28 NOTE — TELEPHONE ENCOUNTER
"2011, 2014: [facility] pap,neg HPV Plan cotest in 3 yrs.  1/2016 Pap NIL, cotest one year.  03/10/17: [facility] pap, Neg HR HPV result. Plan [place] due by 06/10/17.  4/21/17: [place]-CIN1. Plan: diagnostic Pap in 6 mo.  11/10/17 [facility] pap, neg HR HPV. Refer back to ObGyn.  12/13/17: [place] Bx NEO I, ECC Neg for dysplasia. Plan cotest in 1 year or LEEP if the pt desires this. Pt decided to repeat a cotest in 1 year. (abstracted records)  01/03/19 Patient is lost to pap tracking follow-up.   6/28/19 NIL, Neg HPV. Plan 1 yr co-test.   12/16/20 ASCUS, Neg HPV. Plan: \"referral to ob/gyn specialists whom pt saw in 2017\"  12/28/20 left msg    "

## 2020-12-28 NOTE — TELEPHONE ENCOUNTER
Patient notified of results and recommendation for follow up by phone today. All questions answered to her satisfaction. Patient agrees with plan.     SHERRI Cabezas, RN-BC

## 2020-12-29 DIAGNOSIS — Z13.6 CARDIOVASCULAR SCREENING; LDL GOAL LESS THAN 130: ICD-10-CM

## 2020-12-29 LAB
BASOPHILS # BLD AUTO: 0 10E9/L (ref 0–0.2)
BASOPHILS NFR BLD AUTO: 0.4 %
DIFFERENTIAL METHOD BLD: NORMAL
EOSINOPHIL # BLD AUTO: 0.4 10E9/L (ref 0–0.7)
EOSINOPHIL NFR BLD AUTO: 7.4 %
ERYTHROCYTE [DISTWIDTH] IN BLOOD BY AUTOMATED COUNT: 13 % (ref 10–15)
HCT VFR BLD AUTO: 43.1 % (ref 35–47)
HGB BLD-MCNC: 13.8 G/DL (ref 11.7–15.7)
LYMPHOCYTES # BLD AUTO: 1.9 10E9/L (ref 0.8–5.3)
LYMPHOCYTES NFR BLD AUTO: 33.3 %
MCH RBC QN AUTO: 28.8 PG (ref 26.5–33)
MCHC RBC AUTO-ENTMCNC: 32 G/DL (ref 31.5–36.5)
MCV RBC AUTO: 90 FL (ref 78–100)
MONOCYTES # BLD AUTO: 0.5 10E9/L (ref 0–1.3)
MONOCYTES NFR BLD AUTO: 8.9 %
NEUTROPHILS # BLD AUTO: 2.8 10E9/L (ref 1.6–8.3)
NEUTROPHILS NFR BLD AUTO: 50 %
PLATELET # BLD AUTO: 310 10E9/L (ref 150–450)
RBC # BLD AUTO: 4.8 10E12/L (ref 3.8–5.2)
WBC # BLD AUTO: 5.6 10E9/L (ref 4–11)

## 2020-12-29 PROCEDURE — 80061 LIPID PANEL: CPT | Performed by: FAMILY MEDICINE

## 2020-12-29 PROCEDURE — 80050 GENERAL HEALTH PANEL: CPT | Performed by: FAMILY MEDICINE

## 2020-12-29 PROCEDURE — 36415 COLL VENOUS BLD VENIPUNCTURE: CPT | Performed by: FAMILY MEDICINE

## 2020-12-30 LAB
ALBUMIN SERPL-MCNC: 3.8 G/DL (ref 3.4–5)
ALP SERPL-CCNC: 122 U/L (ref 40–150)
ALT SERPL W P-5'-P-CCNC: 27 U/L (ref 0–50)
ANION GAP SERPL CALCULATED.3IONS-SCNC: 4 MMOL/L (ref 3–14)
AST SERPL W P-5'-P-CCNC: 22 U/L (ref 0–45)
BILIRUB SERPL-MCNC: 0.4 MG/DL (ref 0.2–1.3)
BUN SERPL-MCNC: 13 MG/DL (ref 7–30)
CALCIUM SERPL-MCNC: 8.8 MG/DL (ref 8.5–10.1)
CHLORIDE SERPL-SCNC: 107 MMOL/L (ref 94–109)
CHOLEST SERPL-MCNC: 253 MG/DL
CO2 SERPL-SCNC: 28 MMOL/L (ref 20–32)
CREAT SERPL-MCNC: 0.7 MG/DL (ref 0.52–1.04)
GFR SERPL CREATININE-BSD FRML MDRD: >90 ML/MIN/{1.73_M2}
GLUCOSE SERPL-MCNC: 88 MG/DL (ref 70–99)
HDLC SERPL-MCNC: 63 MG/DL
LDLC SERPL CALC-MCNC: 171 MG/DL
NONHDLC SERPL-MCNC: 190 MG/DL
POTASSIUM SERPL-SCNC: 4.3 MMOL/L (ref 3.4–5.3)
PROT SERPL-MCNC: 7.6 G/DL (ref 6.8–8.8)
SODIUM SERPL-SCNC: 139 MMOL/L (ref 133–144)
TRIGL SERPL-MCNC: 95 MG/DL
TSH SERPL DL<=0.005 MIU/L-ACNC: 1.11 MU/L (ref 0.4–4)

## 2021-01-01 PROBLEM — M54.50 ACUTE LEFT-SIDED LOW BACK PAIN WITHOUT SCIATICA: Status: ACTIVE | Noted: 2018-07-30

## 2021-01-01 PROBLEM — H04.123 DRY EYES: Status: ACTIVE | Noted: 2021-01-01

## 2021-01-01 PROBLEM — E78.5 HYPERLIPIDEMIA LDL GOAL <130: Status: ACTIVE | Noted: 2021-01-01

## 2021-01-15 ENCOUNTER — HEALTH MAINTENANCE LETTER (OUTPATIENT)
Age: 51
End: 2021-01-15

## 2021-01-15 ENCOUNTER — HOSPITAL ENCOUNTER (OUTPATIENT)
Dept: MAMMOGRAPHY | Facility: CLINIC | Age: 51
Discharge: HOME | End: 2021-01-15
Attending: FAMILY MEDICINE | Admitting: FAMILY MEDICINE
Payer: COMMERCIAL

## 2021-01-15 DIAGNOSIS — Z12.31 VISIT FOR SCREENING MAMMOGRAM: ICD-10-CM

## 2021-01-15 PROCEDURE — 77063 BREAST TOMOSYNTHESIS BI: CPT

## 2021-01-20 ENCOUNTER — ANCILLARY PROCEDURE (OUTPATIENT)
Dept: BONE DENSITY | Facility: CLINIC | Age: 51
End: 2021-01-20
Attending: FAMILY MEDICINE
Payer: COMMERCIAL

## 2021-01-20 DIAGNOSIS — Z78.0 ASYMPTOMATIC POSTMENOPAUSAL STATUS: ICD-10-CM

## 2021-01-20 PROCEDURE — 77080 DXA BONE DENSITY AXIAL: CPT | Performed by: INTERNAL MEDICINE

## 2021-02-16 ENCOUNTER — PATIENT OUTREACH (OUTPATIENT)
Dept: FAMILY MEDICINE | Facility: CLINIC | Age: 51
End: 2021-02-16

## 2021-02-16 NOTE — TELEPHONE ENCOUNTER
"12/16/20 ASCUS, Neg HPV. Plan: \"referral to ob/gyn specialists whom pt saw in 2017\"  12/28/20 Pt notified by phone.  02/16/21 mychart inquiry sent to the pt to see if she has had this consult appt.       "

## 2021-04-14 ENCOUNTER — MYC MEDICAL ADVICE (OUTPATIENT)
Dept: FAMILY MEDICINE | Facility: CLINIC | Age: 51
End: 2021-04-14

## 2021-04-14 NOTE — TELEPHONE ENCOUNTER
04/14/21 [employer] inquiry sent to the pt to see if she had this consult appt with [facility].

## 2021-05-12 NOTE — TELEPHONE ENCOUNTER
04/14/21 mychart inquiry sent to the pt to see if she has had this consult appt. No response from the pt.   05/12/21 [employer] inquiry sent to the pt to see if she has had this consult appt.

## 2021-06-08 NOTE — TELEPHONE ENCOUNTER
"Hi Dr. Barnes,   I have been attempting to reach this pt to see if she has been seen by [facility] and she hasn't responded to my latest mychart inquiries. Would you like me to put her at a 1 year cotest due 12/16/21 or would you like other recommendations? Please advise.     Pap Hx:  2011, 2014: ASCUS Pap, Neg HR HPV Plan cotest in 3 yrs.  1/2016 Pap NIL, cotest one year.  03/10/17: [facility] pap, Neg HR HPV result. Plan [place] due by 06/10/17.  4/21/17: [place]-CIN1. Plan: diagnostic Pap in 6 mo.  11/10/17 ASC-H pap, neg HR HPV. Refer back to ObGyn.  12/13/17: [place] Bx NEO I, ECC Neg for dysplasia. Plan cotest in 1 year or LEEP if the pt desires this. Pt decided to repeat a cotest in 1 year. (abstracted records)  01/03/19 Patient is lost to pap tracking follow-up.   6/28/19 NIL, Neg HPV. Plan 1 yr co-test.   12/16/20 ASCUS, Neg HPV. Plan: \"referral to ob/gyn specialists whom pt saw in 2017\"  12/28/20 Pt notified by phone.  02/16/21 mychart inquiry sent to the pt to see if she has had this consult appt pt stated she had not, reminder was sent to the pt to schedule this appt. 04/14/21 mychart inquiry sent to the pt to see if she has had this consult appt, pt viewed, but no response from the pt.   05/12/21 mychart inquiry sent to the pt to see if she has had this consult appt. No response from the pt.       "

## 2021-06-09 NOTE — TELEPHONE ENCOUNTER
Patient due for Consult with [facility].    Reminder done today via telephone call-lm to either schedule a consult appt at [facility] or if declining then New Plan cotest due 12/16/21 per provider.

## 2021-06-09 NOTE — TELEPHONE ENCOUNTER
1 year cotest due 12/16/21  Pt had a NEO 1 on colposcopy with ob/gyn spec in 12/13/2017 . That is the last note from them. Had ASCUS pap with neg Cotest with me on 12/26/2020.

## 2021-09-05 ENCOUNTER — HEALTH MAINTENANCE LETTER (OUTPATIENT)
Age: 51
End: 2021-09-05

## 2021-12-02 ENCOUNTER — PATIENT OUTREACH (OUTPATIENT)
Dept: FAMILY MEDICINE | Facility: CLINIC | Age: 51
End: 2021-12-02
Payer: COMMERCIAL

## 2021-12-02 NOTE — LETTER
December 2, 2021      Sherry Osorio  [address] [place] [address]        Dear ,    This letter is to remind you that you are due for your follow-up Pap smear and Human Papillomavirus (HPV) test.    Please call [phone] to schedule your appointment at your earliest convenience.    If you have completed the appointment outside of the [facility] system, please have the records forwarded to our office. We will update your chart for your provider to review before your next annual wellness visit.     Thank you for choosing [facility]!      Sincerely,    Your [facility] Care Team

## 2022-02-20 ENCOUNTER — HEALTH MAINTENANCE LETTER (OUTPATIENT)
Age: 52
End: 2022-02-20

## 2022-02-27 ASSESSMENT — ENCOUNTER SYMPTOMS
HEMATURIA: 0
PARESTHESIAS: 0
NAUSEA: 0
JOINT SWELLING: 0
DYSURIA: 0
ABDOMINAL PAIN: 0
EYE PAIN: 0
MYALGIAS: 0
COUGH: 0
SORE THROAT: 0
HEADACHES: 0
HEARTBURN: 0
ARTHRALGIAS: 0
PALPITATIONS: 0
HEMATOCHEZIA: 0
FEVER: 0
DIZZINESS: 0
FREQUENCY: 0
CHILLS: 0
BREAST MASS: 0
WEAKNESS: 0
NERVOUS/ANXIOUS: 0
SHORTNESS OF BREATH: 0
CONSTIPATION: 0
DIARRHEA: 0

## 2022-03-02 ENCOUNTER — OFFICE VISIT (OUTPATIENT)
Dept: FAMILY MEDICINE | Facility: CLINIC | Age: 52
End: 2022-03-02
Payer: COMMERCIAL

## 2022-03-02 VITALS
TEMPERATURE: 97.6 F | DIASTOLIC BLOOD PRESSURE: 88 MMHG | BODY MASS INDEX: 26.68 KG/M2 | OXYGEN SATURATION: 98 % | SYSTOLIC BLOOD PRESSURE: 138 MMHG | HEART RATE: 89 BPM | HEIGHT: 66 IN | WEIGHT: 166 LBS

## 2022-03-02 DIAGNOSIS — Z28.20 VACCINE REFUSED BY PATIENT: ICD-10-CM

## 2022-03-02 DIAGNOSIS — Z12.4 CERVICAL CANCER SCREENING: ICD-10-CM

## 2022-03-02 DIAGNOSIS — L30.9 DERMATITIS: ICD-10-CM

## 2022-03-02 DIAGNOSIS — Z15.89 MTHFR MUTATION: ICD-10-CM

## 2022-03-02 DIAGNOSIS — Z23 NEEDS FLU SHOT: ICD-10-CM

## 2022-03-02 DIAGNOSIS — A09 TRAVELER'S DIARRHEA: ICD-10-CM

## 2022-03-02 DIAGNOSIS — Z00.01 ENCOUNTER FOR ROUTINE ADULT MEDICAL EXAM WITH ABNORMAL FINDINGS: Primary | ICD-10-CM

## 2022-03-02 DIAGNOSIS — E78.5 HYPERLIPIDEMIA LDL GOAL <130: ICD-10-CM

## 2022-03-02 DIAGNOSIS — Z13.220 SCREENING FOR HYPERLIPIDEMIA: ICD-10-CM

## 2022-03-02 DIAGNOSIS — E72.12 METHYLENETETRAHYDROFOLATE REDUCTASE DEFICIENCY (H): ICD-10-CM

## 2022-03-02 DIAGNOSIS — M54.50 ACUTE LEFT-SIDED LOW BACK PAIN WITHOUT SCIATICA: ICD-10-CM

## 2022-03-02 DIAGNOSIS — Z12.31 VISIT FOR SCREENING MAMMOGRAM: ICD-10-CM

## 2022-03-02 PROBLEM — Z78.0 ASYMPTOMATIC POSTMENOPAUSAL STATUS: Status: ACTIVE | Noted: 2019-06-28

## 2022-03-02 LAB
BASOPHILS # BLD AUTO: 0 10E3/UL (ref 0–0.2)
BASOPHILS NFR BLD AUTO: 1 %
EOSINOPHIL # BLD AUTO: 0.3 10E3/UL (ref 0–0.7)
EOSINOPHIL NFR BLD AUTO: 6 %
ERYTHROCYTE [DISTWIDTH] IN BLOOD BY AUTOMATED COUNT: 12.6 % (ref 10–15)
HCT VFR BLD AUTO: 40.6 % (ref 35–47)
HGB BLD-MCNC: 13.5 G/DL (ref 11.7–15.7)
IMM GRANULOCYTES # BLD: 0 10E3/UL
IMM GRANULOCYTES NFR BLD: 0 %
LYMPHOCYTES # BLD AUTO: 1.9 10E3/UL (ref 0.8–5.3)
LYMPHOCYTES NFR BLD AUTO: 36 %
MCH RBC QN AUTO: 28.9 PG (ref 26.5–33)
MCHC RBC AUTO-ENTMCNC: 33.3 G/DL (ref 31.5–36.5)
MCV RBC AUTO: 87 FL (ref 78–100)
MONOCYTES # BLD AUTO: 0.4 10E3/UL (ref 0–1.3)
MONOCYTES NFR BLD AUTO: 8 %
NEUTROPHILS # BLD AUTO: 2.7 10E3/UL (ref 1.6–8.3)
NEUTROPHILS NFR BLD AUTO: 50 %
PLATELET # BLD AUTO: 332 10E3/UL (ref 150–450)
RBC # BLD AUTO: 4.67 10E6/UL (ref 3.8–5.2)
WBC # BLD AUTO: 5.4 10E3/UL (ref 4–11)

## 2022-03-02 PROCEDURE — G0145 SCR C/V CYTO,THINLAYER,RESCR: HCPCS | Performed by: FAMILY MEDICINE

## 2022-03-02 PROCEDURE — 99396 PREV VISIT EST AGE 40-64: CPT | Mod: 25 | Performed by: FAMILY MEDICINE

## 2022-03-02 PROCEDURE — 80061 LIPID PANEL: CPT | Performed by: FAMILY MEDICINE

## 2022-03-02 PROCEDURE — 90471 IMMUNIZATION ADMIN: CPT | Performed by: FAMILY MEDICINE

## 2022-03-02 PROCEDURE — 87624 HPV HI-RISK TYP POOLED RSLT: CPT | Performed by: FAMILY MEDICINE

## 2022-03-02 PROCEDURE — 36415 COLL VENOUS BLD VENIPUNCTURE: CPT | Performed by: FAMILY MEDICINE

## 2022-03-02 PROCEDURE — 90682 RIV4 VACC RECOMBINANT DNA IM: CPT | Performed by: FAMILY MEDICINE

## 2022-03-02 PROCEDURE — 80050 GENERAL HEALTH PANEL: CPT | Performed by: FAMILY MEDICINE

## 2022-03-02 RX ORDER — CYCLOBENZAPRINE HCL 5 MG
5-10 TABLET ORAL 3 TIMES DAILY PRN
Qty: 30 TABLET | Refills: 1 | Status: SHIPPED | OUTPATIENT
Start: 2022-03-02

## 2022-03-02 RX ORDER — CIPROFLOXACIN 500 MG/1
500 TABLET, FILM COATED ORAL 2 TIMES DAILY
Qty: 14 TABLET | Refills: 1 | Status: SHIPPED | OUTPATIENT
Start: 2022-03-02 | End: 2022-03-05

## 2022-03-02 ASSESSMENT — ENCOUNTER SYMPTOMS
DYSURIA: 0
PALPITATIONS: 0
DIARRHEA: 0
DIZZINESS: 0
EYE PAIN: 0
NAUSEA: 0
CONSTIPATION: 0
BREAST MASS: 0
COUGH: 0
WEAKNESS: 0
HEMATOCHEZIA: 0
MYALGIAS: 0
HEADACHES: 0
CHILLS: 0
SORE THROAT: 0
ABDOMINAL PAIN: 0
FEVER: 0
JOINT SWELLING: 0
SHORTNESS OF BREATH: 0
NERVOUS/ANXIOUS: 0
ARTHRALGIAS: 0
HEMATURIA: 0
PARESTHESIAS: 0
HEARTBURN: 0
FREQUENCY: 0

## 2022-03-02 NOTE — PATIENT INSTRUCTIONS
[facility]  [address] [place] [address]  Office: [phone]   Fax:    [phone]       For possible COVID-19 novel coronavirus vaccine or other vaccine mild reactions not related to allergies:   I would not take the Ibuprofen prior to your shot, but rather afterward.  No prescription steroids within 2 weeks of the shots as they can suppress your immune system, but Ibuprofen is not a significant immunosuppressant at the 400mg dosage.   Ok to take tylenol 2-325 or 2-500mg tabs prior to vaccine.  Ok to also take claritin (a non-sedating anti-histamine) 10 mg daily for 2 days and 2 otc Ibuprofen every 4-6 hours while awake for the next 36 hours and I had no problems.  There has been some controversy about whether to take Ibuprofen 400mg by mouth every 4-5 hours or naproxen 220mg by mouth twice daily  ,but nothing concrete from the CDC or WHO to recommend against taking those medications.  If you do take them, take them with food so they don't irritate your stomach.  You can do the above regimen for 2-3 after your first,  Second, or third  shots to decrease the possibility of achiness, fever, chills after your COVID-19 novel coronavirus vaccines.      Hope that helps!   Sincerely,  Selena Barnes MD           Patient Education     Prevention Guidelines, Women Ages 50 to 64  Screening tests and vaccines are an important part of managing your health. A screening test is done to find possible disorders or diseases in people who don't have any symptoms. The goal is to find a disease early so lifestyle changes can be made and you can be watched more closely to reduce the risk of disease, or to detect it early enough to treat it most effectively. Screening tests are not considered diagnostic, but are used to determine if more testing is needed. Health counseling is essential, too. Below are guidelines for these, for women ages 50 to 64. Keep in mind that screening advice  varies among expert groups. Talk with your healthcare provider about which tests are best for you and to make sure you re up to date on what you need.   Screening  Who needs it  How often    Type 2 diabetes or prediabetes  All women beginning at age 45 and women without symptoms at any age who are overweight or obese and have 1 or more additional risk factors for diabetes.  At  least every 3 years    Type 2 diabetes or prediabetes  All women diagnosed with gestational diabetes  Lifelong testing at least every 3 years    Type 2 diabetes All women with prediabetes  Every year   Unhealthy alcohol use  All women in this age group  At routine exams   Blood pressure All women in this age group  Yearly checkup if your blood pressure is normal   Normal blood pressure is less than 120/80 mm Hg   If your blood pressure reading is higher than normal, follow the advice of your healthcare provider    Breast cancer All women at average risk in this age group  Yearly mammogram should be done until age 54. At age 55, you can switch to every other year or choose to continue yearly.   All women should know how their breasts normally look and feel and know the possible benefits and risks of breast cancer screening with mammograms.    Cervical cancer All women in this age group, except women who have had a complete hysterectomy  Pap test every 3 years or Pap test with human papillomavirus (HPV) test every 5 years    Chlamydia Women who are sexually active and at increased risk for infection  At yearly routine exams    Colorectal cancer All women at average risk in this age group  Multiple tests are available and are used at different times. Possible tests include:     Flexible sigmoidoscopy every 5 years, or    Colonoscopy every 10 years, or    CT colonography (virtual colonoscopy) every 5 years, or    Yearly fecal occult blood test, or    Yearly fecal immunochemical test every year, or    Stool DNA test, every 3 years  If you choose a  test other than a colonoscopy and have an abnormal test result, you will need to follow up with a colonoscopy. Screening advice varies among expert groups. Talk with your healthcare provider about which tests are best for you.   Some people should be screened using a different schedule because of their personal or family health history. Talk with your healthcare provider about your health history.    Depression All women in this age group  At routine exams   Gonorrhea Sexually active women at increased risk for infection  At yearly routine exams    Hepatitis C Anyone at increased risk; 1 time for those born between 1945 and 1965  At routine exams   High cholesterol or triglycerides  All women in this age group who are at risk for coronary artery disease  At least every 5 years; talk with your healthcare provider about your risk    HIV All women At least once during your lifetime; yearly if at high risk    Lung cancer Women between the ages of 55 to 74 who are in fairly good health and are at higher risk for lung cancer         Currently smoke or have  quit within past 15 years         30-pack-year smoking history  , Eligibility criteria and age limit (possibly up to age 80) may vary across major organizations  Yearly lung cancer screening with a low-dose CT scan (LDCT) Talk with your healthcare provider for more information.    Obesity All women in this age group  At yearly routine exams    Osteoporosis Women who are postmenopausal  Talk with your healthcare provider    Syphilis Women at increased risk for infection  At routine exams; talk with your healthcare provider    Tuberculosis Women at increased risk for infection  Talk with your healthcare provider    Vision All women in this age group  Talk with your healthcare provider    Vaccine Who needs it How often   Chickenpox (varicella)  All women in this age group who have no record of this infection or vaccine  2 doses; the second dose should be given at least 4  weeks after the first dose    Hepatitis A Women at increased risk for infection  2 or 3 doses (depending on the vaccine) given at least 6 months apart; talk with your healthcare provider    Hepatitis B Women at increased risk for infection  2 or 3 doses (depending on the vaccine) ; second dose should be given 1 month after the first dose; if a third dose, it should be given at least 2 months after the second dose and at least 4 months after the first dose; talk with your healthcare provider    Haemophilus influenzae Type B (HIB)  Women at increased risk for infection  1 or 3 doses; talk with your healthcare provider    Influenza (flu) All women in this age group  Once a year   Measles, mumps, rubella (MMR)  Women in this age group born in 1957 or later who have no record of these infections or vaccines  1 or 2doses   Meningococcal Women at increased risk for infection  1 or more doses; talk with your healthcare provider    Pneumococcal conjugate vaccine (PCV13) and pneumococcal polysaccharide vaccine (PPSV23)  Women at increased risk for infection  PCV13: 1 dose ages 19 to 64 (protects against 13 types of pneumococcal bacteria)   PPSV23: 1 or 2 doses through age 64(protects against 23 types of pneumococcal bacteria)   Talk with your healthcare provider   Tetanus/diphtheria/pertussis (Td/Tdap) booster All women in this age group  Td every 10 years, or a 1-time dose of Tdap instead of a Td booster after age 18, then Td every 10 years    Recombinant zoster vaccine (RZV)  All women ages 50 and older  If 2 doses; the 2nd dose is given 2 to 6 months after the first. This is given even if you've had shingles before or had a previous zoster live vaccine.    Counseling Who needs it How often   BRCA gene mutation testing for breast and ovarian cancer susceptibility  Women with increased risk for having gene mutation  When your risk is known; talk with your healthcare provider    Breast cancer and chemoprevention  Women at  high risk for breast cancer  When your risk is known; talk with your healthcare provider    Diet and exercise Women who are overweight or obese  When diagnosed, and then at routine exams    Sexually transmitted infection prevention  Women at increased risk for infection  At routine exams; talk with your healthcare provider    Use of daily aspirin  Women ages 50 and up who are at high risk for cardiovascular health problems and not at increased risk for bleeding as identified by their healthcare provider  When your risk is known; talk with your healthcare provider    Use of tobacco and the health effects it can cause  All women in this age group  Every exam   [employer] last reviewed this educational content on 6/1/2020 2000-2021 [employer]. All rights reserved. This information is not intended as a substitute for professional medical care. Always follow your healthcare professional's instructions.         Thank you so much or choosing [facility]  for your Health Care. It was a pleasure seeing you at your visit today! Please contact us with any questions or concerns you may have.                   Selena Barnes MD                              To reach your [facility] care team after hours call:   [phone]    PLEASE NOTE OUR HOURS HAVE CHANGED secondary to COVID-19 coronavirus pandemic, as we are trying to minimize patient exposure to the virus,  which is now widespread in the [facility].  These hours may change with very little notice.  We apologize for any inconvenience.       Our current clinic hours are:          Monday- Thursday   7:00am - 6:00pm  in person.      Friday  7:00am- 5:00pm                       Saturday and Sunday : Closed to in person and virtual visits        We have telephone and virtual visit times available between    7:00am - 6pm on Monday-Friday as well.                                                Phone:  [phone]      Our pharmacy hours: Monday through Friday 8:00am to 5:00pm                        Saturday - 9:00 am to 12 noon       Sunday : Closed.              Phone:  [phone]              ###  Please note: at this time we are not accepting any walk-in visits. ###      There is also information available at our web site:  [email or web address]    If your provider ordered any lab tests and you do not receive the results within 10 business days, please call the clinic.    If you need a medication refill please contact your pharmacy.  Please allow 3 business days for your refill to be completed.    Our clinic offers telephone visits and e visits.  Please ask one of your team members to explain more.      Use MyChart (secure email communication and access to your chart) to send your primary care provider a message or make an appointment. Ask someone on your Team how to sign up for [employer].

## 2022-03-02 NOTE — PROGRESS NOTES
SUBJECTIVE:   CC: Sherry Osorio is an 51 year old woman who presents for preventive health visit and the following other medical problems:      1. Encounter for routine adult medical exam with abnormal findings    2. Cervical cancer screening    3. Screening for hyperlipidemia    4. Visit for screening mammogram    5. Methylenetetrahydrofolate reductase deficiency (H)    6. Needs flu shot    7. Hyperlipidemia LDL goal <130    8. Acute left-sided low back pain without sciatica- intermittent recurrences - not right now, but desires cyclobenzaprine rx on hold     9. Dermatitis- every winter - bilateral flanks and hips    10. Traveler's diarrhea    11. Vaccine refused by patient- covid-19 third shot - prior to travel to [place] this weekend     12. MTHFR mutation - heterozygote      Had covid 19 at time of alpha variant.  .           Patient has been advised of split billing requirements and indicates understanding: Yes  Healthy Habits:     Getting at least 3 servings of Calcium per day:  NO    Bi-annual eye exam:  Yes    Dental care twice a year:  Yes    Sleep apnea or symptoms of sleep apnea:  None    Diet:  Other    Frequency of exercise:  4-5 days/week    Duration of exercise:  30-45 minutes    Taking medications regularly:  Yes    Medication side effects:  None    PHQ-2 Total Score: 0    Additional concerns today:  No              Today's PHQ-2 Score:   PHQ-2 ( 1999 Pfizer) 2/27/2022   Q1: Little interest or pleasure in doing things 0   Q2: Feeling down, depressed or hopeless 0   PHQ-2 Score 0   PHQ-2 Total Score (12-17 Years)- Positive if 3 or more points; Administer PHQ-A if positive -   Q1: Little interest or pleasure in doing things Not at all   Q2: Feeling down, depressed or hopeless Not at all   PHQ-2 Score 0       Abuse: Current or Past (Physical, Sexual or Emotional) - No  Do you feel safe in your environment? Yes    Have you ever done Advance Care Planning? (For example, a Health Directive, POLST,  or a discussion with a medical provider or your loved ones about your wishes): No, advance care planning information given to patient to review.  Patient declined advance care planning discussion at this time.    Social History     Tobacco Use     Smoking status: Never Smoker     Smokeless tobacco: Never Used   Substance Use Topics     Alcohol use: Yes     Alcohol/week: 0.0 - 1.7 standard drinks     Comment: maybe 0-1 weekly         Alcohol Use 2/27/2022   Prescreen: >3 drinks/day or >7 drinks/week? No   Prescreen: >3 drinks/day or >7 drinks/week? -       Reviewed orders with patient.  Reviewed health maintenance and updated orders accordingly - Yes  Lab work is in process  Labs reviewed in EPIC  BP Readings from Last 3 Encounters:   03/02/22 138/88   12/16/20 110/82   09/10/20 110/72    Wt Readings from Last 3 Encounters:   03/02/22 75.3 kg (166 lb)   12/16/20 70.8 kg (156 lb)   09/10/20 71.2 kg (157 lb)                  Patient Active Problem List   Diagnosis     Advanced directives, counseling/discussion     Multiple lipomas     Sebaceous cyst- left upper forehead      Syncope and collapse     Raynaud's phenomenon - noted 10/17/2011 at first visit      Cystocele with uterine descensus- mild w/ both      Cervical pain     Family history of clotting disorder- sister with MTHFR     Dermatitis- every winter - bilateral flanks and hips     MTHFR mutation - heterozygote     Headache     Chronic neck pain     Mild dysplasia of cervix     DDD (degenerative disc disease), cervical     DDD (degenerative disc disease), thoracic     Cervical spinal stenosis     Chronic constipation     Acute left-sided low back pain without sciatica     Asymptomatic postmenopausal status - since 4/2012 - some mood swings, but scant hot flashes , no night sweats      Dry eyes- sometimes increased watering and sometimes a bit painful in the am's     Hyperlipidemia LDL goal <130     Methylenetetrahydrofolate reductase deficiency (H)     Past  Surgical History:   Procedure Laterality Date     BIOPSY  1995; 2017    needle biopsy of mass in left breast;shave biopsy of scalp     COLONOSCOPY  2016    Dr. Diaz [facility]     COLONOSCOPY N/A 2016    Procedure: COLONOSCOPY;  Surgeon: Oscar Diaz MD;  Location: [facility]     EXCISE MASS HEAD  2012    Procedure: EXCISE MASS HEAD;  Excision Left Forehead Mass, Excision Right Posterior Thigh Mass, Excision Left Posterior Thigh Mass ;  Surgeon: Felix Trivedi MD;  Location: [facility]     EXCISE MASS LOWER EXTREMITY  2012    Procedure: EXCISE MASS LOWER EXTREMITY;;  Surgeon: Felix Trivedi MD;  Location: [facility] OR     wisdom teeth extraction         Social History     Tobacco Use     Smoking status: Never Smoker     Smokeless tobacco: Never Used   Substance Use Topics     Alcohol use: Yes     Alcohol/week: 0.0 - 1.7 standard drinks     Comment: maybe 0-1 weekly     Family History   Problem Relation Age of Onset     Asthma Father      Hypertension Father      Cerebrovascular Disease Father      Prostate Cancer Father      Neurologic Disorder Father         Lewy Body dementia - broke hip-  at age 76      Cerebrovascular Disease Sister 47     Thyroid Disease Sister 68        hypothyroid      Blood Disease Sister 68        MTHFR deficiency - not factor V leiden      Cerebrovascular Disease Sister 15        secondary to MTHFR deficiency      Cancer Sister 30        Breast cancer dx'd 2012     Breast Cancer Sister      Blood Disease Sister      Cancer Sister      Depression Sister      Thyroid Disease Sister      Cerebrovascular Disease Sister      Breast Cancer Sister         just had genetic testing done 2019 - she's heterozygous MTHFR      Depression Sister      Thyroid Disease Sister      Endometrial Cancer Sister 51        Endometrioid adenocarcinoma grade 1     Other - See Comments Sister         heterozygous MTHFR      Colon Cancer Other         Maternal aunt         Current  Outpatient Medications   Medication Sig Dispense Refill     aspirin 81 MG tablet Take 1 tablet (81 mg) by mouth daily 30 tablet      cefPROZIL (CEFZIL) 500 MG tablet        childrens multivitamin with iron (FLINTSTONES COMPLETE) CHEW Take 2 tablets by mouth daily 100 tablet 4     ciprofloxacin (CIPRO) 500 MG tablet Take 1 tablet (500 mg) by mouth 2 times daily for 3 days For traveller's diarrhea or for 5-7 days for skin infection 14 tablet 1     cyclobenzaprine (FLEXERIL) 5 MG tablet Take 1-2 tablets (5-10 mg) by mouth 3 times daily as needed for muscle spasms 30 tablet 1     triamcinolone (KENALOG) 0.1 % cream Apply topically 3 times daily as needed Apply sparingly to affected area. 80 g 5     VITAMIN D PO        No Known Allergies  Recent Labs   Lab Test 12/29/20  1012 07/03/19  0828 03/10/17  1621   * 138* 130*   HDL 63 58 64   TRIG 95 50 47   ALT 27 20 23   CR 0.70 0.72 0.73   GFRESTIMATED >90 >90 86   GFRESTBLACK >90 >90 >90  African American GFR Calc     POTASSIUM 4.3 4.2 4.5   TSH 1.11 1.25 0.59        Breast Cancer Screening:    FHS-7:   Breast CA Risk Assessment (FHS-7) 2/27/2022   Did any of your first-degree relatives have breast or ovarian cancer? Yes   Did any of your relatives have bilateral breast cancer? No   Did any man in your family have breast cancer? No   Did any woman in your family have breast and ovarian cancer? Yes   Did any woman in your family have breast cancer before age 50 y? Yes   Do you have 2 or more relatives with breast and/or ovarian cancer? Yes   Do you have 2 or more relatives with breast and/or bowel cancer? Yes       Mammogram Screening: Recommended annual mammography  Pertinent mammograms are reviewed under the imaging tab.    History of abnormal Pap smear:   YES - updated in Problem List and Health Maintenance accordingly  Last 3 Pap Results:   PAP (no units)   Date Value   12/16/2020 ASC-US (A)   06/28/2019 NIL   11/10/2017 ASC-H (A)     PAP / HPV Latest Ref Rng &  Units 2020 2019 11/10/2017   PAP (Historical) - ASC-US(A) NIL ASC-H(A)   HPV16 NEG:Negative Negative Negative Negative   HPV18 NEG:Negative Negative Negative Negative   HRHPV NEG:Negative Negative Negative Negative     Reviewed and updated as needed this visit by clinical staff                  Reviewed and updated as needed this visit by Provider                 Past Medical History:   Diagnosis Date     Arthritis 2010    osteoarthritis...     ASCUS favor benign ,     neg HPV Plan cotest in 3 yrs.     Cellulitis of right leg w/ abrasion 2012     Cervicalgia      Headache      Left ankle injury 2012     MTHFR mutation     heterozygote - referred to hematology     Pap smear cannot exclude high grade squamous intraepithelial lesion (ASC-H) 03/10/2017    03/10/17: ASC-H pap, Neg HR HPV result.      Puncture wound of left lower leg with complication- cellulitis 2012     Vertigo summer 2006    rehydrated over night in hosp = better       Past Surgical History:   Procedure Laterality Date     BIOPSY  1995; 2017    needle biopsy of mass in left breast;shave biopsy of scalp     COLONOSCOPY  2016    Dr. Diaz [facility]     COLONOSCOPY N/A 2016    Procedure: COLONOSCOPY;  Surgeon: Oscar Diaz MD;  Location: [facility]     EXCISE MASS HEAD  2012    Procedure: EXCISE MASS HEAD;  Excision Left Forehead Mass, Excision Right Posterior Thigh Mass, Excision Left Posterior Thigh Mass ;  Surgeon: Felix Trivedi MD;  [facility]     EXCISE MASS LOWER EXTREMITY  2012    Procedure: EXCISE MASS LOWER EXTREMITY;;  Surgeon: Felix Trivedi MD;  Location: [facility]     wisdom teeth extraction       OB History    Para Term  AB Living   4 4 4 0 0 4   SAB IAB Ectopic Multiple Live Births   0 0 0 0 0      # Outcome Date GA Lbr Clement/2nd Weight Sex Delivery Anes PTL Lv   4 Term            3 Term            2 Term            1 Term               Obstetric Comments     x 4 - no complications with any pregnancies , labors or deliveries       Review of Systems   Constitutional: Negative for chills and fever.   HENT: Positive for congestion and ear pain. Negative for hearing loss and sore throat.    Eyes: Positive for visual disturbance. Negative for pain.   Respiratory: Negative for cough and shortness of breath.    Cardiovascular: Positive for peripheral edema. Negative for chest pain and palpitations.   Gastrointestinal: Negative for abdominal pain, constipation, diarrhea, heartburn, hematochezia and nausea.   Breasts:  Negative for tenderness, breast mass and discharge.   Genitourinary: Negative for dysuria, frequency, genital sores, hematuria, pelvic pain, urgency, vaginal bleeding and vaginal discharge.   Musculoskeletal: Negative for arthralgias, joint swelling and myalgias.   Skin: Negative for rash.   Neurological: Negative for dizziness, weakness, headaches and paresthesias.   Psychiatric/Behavioral: Negative for mood changes. The patient is not nervous/anxious.      CONSTITUTIONAL: NEGATIVE for fever, chills, change in weight  INTEGUMENTARY/SKIN: NEGATIVE for worrisome rashes, moles or lesions  EYES: NEGATIVE for vision changes or irritation  ENT: NEGATIVE for ear, mouth and throat problems  RESP: NEGATIVE for significant cough or SOB  BREAST: NEGATIVE for masses, tenderness or discharge  CV: NEGATIVE for chest pain, palpitations or peripheral edema  GI: NEGATIVE for nausea, abdominal pain, heartburn, or change in bowel habits  : NEGATIVE for unusual urinary or vaginal symptoms. No vaginal bleeding.  MUSCULOSKELETAL: NEGATIVE for significant arthralgias or myalgia  NEURO: NEGATIVE for weakness, dizziness or paresthesias  ENDOCRINE: NEGATIVE for temperature intolerance, skin/hair changes  HEME/ALLERGY/IMMUNE: NEGATIVE for bleeding problems  PSYCHIATRIC: NEGATIVE for changes in mood or affect      OBJECTIVE:   /88   Pulse 89   Temp 97.6  F (36.4  C)   Ht  "1.664 m (5' 5.5\")   Wt 75.3 kg (166 lb)   LMP 04/01/2015   SpO2 98%   BMI 27.20 kg/m    Physical Exam  GENERAL APPEARANCE: healthy, alert and no distress  EYES: Eyes grossly normal to inspection, PERRL and conjunctivae and sclerae normal  HENT: ear canals and TM's normal, nose and mouth without ulcers or lesions, oropharynx clear and oral mucous membranes moist  NECK: no adenopathy, no asymmetry, masses, or scars and thyroid normal to palpation  RESP: lungs clear to auscultation - no rales, rhonchi or wheezes  BREAST: normal without masses, tenderness or nipple discharge and no palpable axillary masses or adenopathy  CV: regular rate and rhythm, normal S1 S2, no S3 or S4, no murmur, click or rub, no peripheral edema and peripheral pulses strong  ABDOMEN: soft, nontender, no hepatosplenomegaly, no masses and bowel sounds normal   (female): normal female external genitalia, normal urethral meatus, vaginal mucosal atrophy noted, normal cervix, adnexae, and uterus without masses or abnormal discharge  MS: no musculoskeletal defects are noted and gait is age appropriate without ataxia  SKIN: no suspicious lesions or rashes  NEURO: Normal strength and tone, sensory exam grossly normal, mentation intact and speech normal  PSYCH: mentation appears normal and affect normal/bright    Diagnostic Test Results:  Labs reviewed in Epic    ASSESSMENT/PLAN:       ICD-10-CM    1. Encounter for routine adult medical exam with abnormal findings  Z00.01    2. Cervical cancer screening  Z12.4 Pap Screen with HPV - recommended age 30 - 65 years     HPV Hold (Lab Only)     HPV High Risk Types DNA Cervical   3. Screening for hyperlipidemia  Z13.220 Lipid panel reflex to direct LDL Fasting     Lipid panel reflex to direct LDL Fasting   4. Visit for screening mammogram  Z12.31 MA Screen Bilateral w/Christian   5. Methylenetetrahydrofolate reductase deficiency (H)  E72.12 childrens multivitamin with iron (FLINTSTONES COMPLETE) CHEW   6. Needs " "flu shot  Z23 INFLUENZA QUAD, RECOMBINANT, P-FREE (RIV4) (FLUBLOK) [QSY117]     VACCINE ADMINISTRATION, INITIAL   7. Hyperlipidemia LDL goal <130  E78.5 CBC with platelets and differential     Comprehensive metabolic panel (BMP + Alb, Alk Phos, ALT, AST, Total. Bili, TP)     TSH with free T4 reflex     CBC with platelets and differential     Comprehensive metabolic panel (BMP + Alb, Alk Phos, ALT, AST, Total. Bili, TP)     TSH with free T4 reflex     **ALT FUTURE 2mo     **AST FUTURE 2mo     Lipid panel reflex to direct LDL Fasting   8. Acute left-sided low back pain without sciatica- intermittent recurrences - not right now, but desires cyclobenzaprine rx on hold   M54.50 cyclobenzaprine (FLEXERIL) 5 MG tablet   9. Dermatitis- every winter - bilateral flanks and hips  L30.9    10. Traveler's diarrhea  A09 ciprofloxacin (CIPRO) 500 MG tablet   11. Vaccine refused by patient- covid-19 third shot - prior to travel to [place] this weekend   Z28.20    12. MTHFR mutation - heterozygote  Z15.89 childrens multivitamin with iron (FLINTSTONES COMPLETE) CHEW       Patient has been advised of split billing requirements and indicates understanding: Yes    COUNSELING:  Reviewed preventive health counseling, as reflected in patient instructions    Estimated body mass index is 25.56 kg/m  as calculated from the following:    Height as of 12/16/20: 1.664 m (5' 5.5\").    Weight as of 12/16/20: 70.8 kg (156 lb).        She reports that she has never smoked. She has never used smokeless tobacco.      Counseling Resources:  ATP IV Guidelines  Pooled Cohorts Equation Calculator  Breast Cancer Risk Calculator  BRCA-Related Cancer Risk Assessment: FHS-7 Tool  FRAX Risk Assessment  ICSI Preventive Guidelines  Dietary Guidelines for Americans, 2010  USDA's MyPlate  ASA Prophylaxis  Lung CA Screening           Selena Barnes MD  [facility] [place]  "

## 2022-03-04 LAB
ALBUMIN SERPL-MCNC: 4 G/DL (ref 3.4–5)
ALP SERPL-CCNC: 108 U/L (ref 40–150)
ALT SERPL W P-5'-P-CCNC: 22 U/L (ref 0–50)
ANION GAP SERPL CALCULATED.3IONS-SCNC: 7 MMOL/L (ref 3–14)
AST SERPL W P-5'-P-CCNC: 21 U/L (ref 0–45)
BILIRUB SERPL-MCNC: 0.4 MG/DL (ref 0.2–1.3)
BUN SERPL-MCNC: 12 MG/DL (ref 7–30)
CALCIUM SERPL-MCNC: 9.4 MG/DL (ref 8.5–10.1)
CHLORIDE BLD-SCNC: 105 MMOL/L (ref 94–109)
CHOLEST SERPL-MCNC: 247 MG/DL
CO2 SERPL-SCNC: 26 MMOL/L (ref 20–32)
CREAT SERPL-MCNC: 0.79 MG/DL (ref 0.52–1.04)
FASTING STATUS PATIENT QL REPORTED: YES
GFR SERPL CREATININE-BSD FRML MDRD: 90 ML/MIN/1.73M2
GLUCOSE BLD-MCNC: 87 MG/DL (ref 70–99)
HDLC SERPL-MCNC: 67 MG/DL
LDLC SERPL CALC-MCNC: 167 MG/DL
NONHDLC SERPL-MCNC: 180 MG/DL
POTASSIUM BLD-SCNC: 4.1 MMOL/L (ref 3.4–5.3)
PROT SERPL-MCNC: 7.4 G/DL (ref 6.8–8.8)
SODIUM SERPL-SCNC: 138 MMOL/L (ref 133–144)
TRIGL SERPL-MCNC: 63 MG/DL
TSH SERPL DL<=0.005 MIU/L-ACNC: 0.48 MU/L (ref 0.4–4)

## 2022-03-07 LAB
BKR LAB AP GYN ADEQUACY: NORMAL
BKR LAB AP GYN INTERPRETATION: NORMAL
BKR LAB AP HPV REFLEX: NORMAL
BKR LAB AP LMP: NORMAL
BKR LAB AP PREVIOUS ABNL DX: NORMAL
BKR LAB AP PREVIOUS ABNORMAL: NORMAL
PATH REPORT.COMMENTS IMP SPEC: NORMAL
PATH REPORT.COMMENTS IMP SPEC: NORMAL
PATH REPORT.RELEVANT HX SPEC: NORMAL

## 2022-03-08 LAB
HUMAN PAPILLOMA VIRUS 16 DNA: NEGATIVE
HUMAN PAPILLOMA VIRUS 18 DNA: NEGATIVE
HUMAN PAPILLOMA VIRUS FINAL DIAGNOSIS: NORMAL
HUMAN PAPILLOMA VIRUS OTHER HR: NEGATIVE

## 2022-03-09 ENCOUNTER — PATIENT OUTREACH (OUTPATIENT)
Dept: FAMILY MEDICINE | Facility: CLINIC | Age: 52
End: 2022-03-09
Payer: COMMERCIAL

## 2022-03-21 ENCOUNTER — HOSPITAL ENCOUNTER (OUTPATIENT)
Dept: MAMMOGRAPHY | Facility: CLINIC | Age: 52
Discharge: HOME | End: 2022-03-21
Attending: FAMILY MEDICINE | Admitting: FAMILY MEDICINE
Payer: COMMERCIAL

## 2022-03-21 DIAGNOSIS — Z12.31 VISIT FOR SCREENING MAMMOGRAM: ICD-10-CM

## 2022-03-21 PROCEDURE — 77067 SCR MAMMO BI INCL CAD: CPT

## 2022-03-25 NOTE — RESULT ENCOUNTER NOTE
Your mammogram was normal.     Thank you so much for choosing [facility].  Please contact us with any questions that you may have.   We appreciate the opportunity to serve you now and look forward to supporting your healthcare needs for a long time to come!    Most Sincerely,     Selena Barnes MD

## 2022-05-03 ENCOUNTER — MYC MEDICAL ADVICE (OUTPATIENT)
Dept: FAMILY MEDICINE | Facility: CLINIC | Age: 52
End: 2022-05-03
Payer: COMMERCIAL

## 2022-05-04 NOTE — TELEPHONE ENCOUNTER
My chart message sent     Fallon Lee RN, BSN  [facility] [place]

## 2022-10-23 ENCOUNTER — HEALTH MAINTENANCE LETTER (OUTPATIENT)
Age: 52
End: 2022-10-23

## 2023-02-13 ENCOUNTER — PATIENT OUTREACH (OUTPATIENT)
Dept: FAMILY MEDICINE | Facility: CLINIC | Age: 53
End: 2023-02-13
Payer: COMMERCIAL

## 2023-02-13 DIAGNOSIS — N87.0 MILD DYSPLASIA OF CERVIX: Primary | ICD-10-CM

## 2023-02-13 NOTE — LETTER
February 13, 2023      Sherry Osorio  [address] [place] [address]        Dear ,    This letter is to remind you that you are due for your follow-up Pap smear and Human Papillomavirus (HPV) test.    Please call [phone] to schedule your appointment at your earliest convenience.    If you have completed the appointment outside of the [facility] system, please have the records forwarded to our office. We will update your chart for your provider to review before your next annual wellness visit.     Thank you for choosing [facility]!      Sincerely,    Your [facility] Care Team

## 2023-02-23 ENCOUNTER — MYC MEDICAL ADVICE (OUTPATIENT)
Dept: FAMILY MEDICINE | Facility: CLINIC | Age: 53
End: 2023-02-23
Payer: COMMERCIAL

## 2023-03-31 ENCOUNTER — HOSPITAL ENCOUNTER (OUTPATIENT)
Dept: MAMMOGRAPHY | Facility: CLINIC | Age: 53
Discharge: HOME | End: 2023-03-31
Attending: FAMILY MEDICINE | Admitting: FAMILY MEDICINE
Payer: COMMERCIAL

## 2023-03-31 DIAGNOSIS — Z12.31 VISIT FOR SCREENING MAMMOGRAM: ICD-10-CM

## 2023-03-31 PROCEDURE — 77067 SCR MAMMO BI INCL CAD: CPT

## 2023-04-02 ENCOUNTER — HEALTH MAINTENANCE LETTER (OUTPATIENT)
Age: 53
End: 2023-04-02

## 2023-04-05 NOTE — RESULT ENCOUNTER NOTE
Your mammogram was normal.     Thank you so much for choosing [facility].  Please contact us with any questions that you may have.   We appreciate the opportunity to serve you now and look forward to supporting your healthcare needs for a long time to come!    Most Sincerely,     Selena Barnes MD

## 2023-06-02 ENCOUNTER — OFFICE VISIT (OUTPATIENT)
Dept: FAMILY MEDICINE | Facility: CLINIC | Age: 53
End: 2023-06-02
Payer: COMMERCIAL

## 2023-06-02 VITALS
HEIGHT: 66 IN | BODY MASS INDEX: 26.24 KG/M2 | DIASTOLIC BLOOD PRESSURE: 60 MMHG | SYSTOLIC BLOOD PRESSURE: 138 MMHG | WEIGHT: 163.3 LBS | OXYGEN SATURATION: 99 % | TEMPERATURE: 97.7 F | RESPIRATION RATE: 14 BRPM | HEART RATE: 78 BPM

## 2023-06-02 DIAGNOSIS — R79.89 LOW TSH LEVEL: ICD-10-CM

## 2023-06-02 DIAGNOSIS — M70.61 TROCHANTERIC BURSITIS OF BOTH HIPS: ICD-10-CM

## 2023-06-02 DIAGNOSIS — Z78.0 ASYMPTOMATIC POSTMENOPAUSAL STATUS: ICD-10-CM

## 2023-06-02 DIAGNOSIS — E72.12 METHYLENETETRAHYDROFOLATE REDUCTASE DEFICIENCY (H): ICD-10-CM

## 2023-06-02 DIAGNOSIS — F43.23 ADJUSTMENT DISORDER WITH MIXED ANXIETY AND DEPRESSED MOOD: ICD-10-CM

## 2023-06-02 DIAGNOSIS — Z12.4 CERVICAL CANCER SCREENING: ICD-10-CM

## 2023-06-02 DIAGNOSIS — N87.0 MILD DYSPLASIA OF CERVIX: ICD-10-CM

## 2023-06-02 DIAGNOSIS — Z00.00 ROUTINE GENERAL MEDICAL EXAMINATION AT A HEALTH CARE FACILITY: Primary | ICD-10-CM

## 2023-06-02 DIAGNOSIS — E78.5 HYPERLIPIDEMIA LDL GOAL <130: ICD-10-CM

## 2023-06-02 DIAGNOSIS — Z15.89 MTHFR MUTATION: ICD-10-CM

## 2023-06-02 DIAGNOSIS — Z23 NEED FOR SHINGLES VACCINE: ICD-10-CM

## 2023-06-02 DIAGNOSIS — M70.62 TROCHANTERIC BURSITIS OF BOTH HIPS: ICD-10-CM

## 2023-06-02 PROCEDURE — 90472 IMMUNIZATION ADMIN EACH ADD: CPT | Performed by: FAMILY MEDICINE

## 2023-06-02 PROCEDURE — G0124 SCREEN C/V THIN LAYER BY MD: HCPCS | Performed by: PATHOLOGY

## 2023-06-02 PROCEDURE — G0145 SCR C/V CYTO,THINLAYER,RESCR: HCPCS | Performed by: FAMILY MEDICINE

## 2023-06-02 PROCEDURE — 87624 HPV HI-RISK TYP POOLED RSLT: CPT | Performed by: FAMILY MEDICINE

## 2023-06-02 PROCEDURE — 99214 OFFICE O/P EST MOD 30 MIN: CPT | Mod: 25 | Performed by: FAMILY MEDICINE

## 2023-06-02 PROCEDURE — 90746 HEPB VACCINE 3 DOSE ADULT IM: CPT | Performed by: FAMILY MEDICINE

## 2023-06-02 PROCEDURE — 99396 PREV VISIT EST AGE 40-64: CPT | Mod: 25 | Performed by: FAMILY MEDICINE

## 2023-06-02 PROCEDURE — 90715 TDAP VACCINE 7 YRS/> IM: CPT | Performed by: FAMILY MEDICINE

## 2023-06-02 PROCEDURE — 90471 IMMUNIZATION ADMIN: CPT | Performed by: FAMILY MEDICINE

## 2023-06-02 RX ORDER — ESCITALOPRAM OXALATE 5 MG/1
5 TABLET ORAL DAILY
Qty: 30 TABLET | Refills: 5 | Status: SHIPPED | OUTPATIENT
Start: 2023-06-02 | End: 2023-07-10 | Stop reason: SINTOL

## 2023-06-02 ASSESSMENT — ENCOUNTER SYMPTOMS
JOINT SWELLING: 1
HEADACHES: 1
SHORTNESS OF BREATH: 0
HEMATOCHEZIA: 0
ABDOMINAL PAIN: 0
WEAKNESS: 0
SORE THROAT: 0
FEVER: 0
ARTHRALGIAS: 1
DIZZINESS: 0
CHILLS: 0
NERVOUS/ANXIOUS: 1
FREQUENCY: 0
PARESTHESIAS: 0
HEARTBURN: 0
DIARRHEA: 0
COUGH: 0
MYALGIAS: 0
HEMATURIA: 0
DYSURIA: 0
CONSTIPATION: 0
PALPITATIONS: 0
NAUSEA: 0
EYE PAIN: 1
BREAST MASS: 0

## 2023-06-02 ASSESSMENT — ANXIETY QUESTIONNAIRES
3. WORRYING TOO MUCH ABOUT DIFFERENT THINGS: NEARLY EVERY DAY
8. IF YOU CHECKED OFF ANY PROBLEMS, HOW DIFFICULT HAVE THESE MADE IT FOR YOU TO DO YOUR WORK, TAKE CARE OF THINGS AT HOME, OR GET ALONG WITH OTHER PEOPLE?: SOMEWHAT DIFFICULT
GAD7 TOTAL SCORE: 10
5. BEING SO RESTLESS THAT IT IS HARD TO SIT STILL: SEVERAL DAYS
4. TROUBLE RELAXING: NOT AT ALL
2. NOT BEING ABLE TO STOP OR CONTROL WORRYING: NEARLY EVERY DAY
1. FEELING NERVOUS, ANXIOUS, OR ON EDGE: SEVERAL DAYS
7. FEELING AFRAID AS IF SOMETHING AWFUL MIGHT HAPPEN: SEVERAL DAYS
IF YOU CHECKED OFF ANY PROBLEMS ON THIS QUESTIONNAIRE, HOW DIFFICULT HAVE THESE PROBLEMS MADE IT FOR YOU TO DO YOUR WORK, TAKE CARE OF THINGS AT HOME, OR GET ALONG WITH OTHER PEOPLE: SOMEWHAT DIFFICULT
GAD7 TOTAL SCORE: 10
6. BECOMING EASILY ANNOYED OR IRRITABLE: SEVERAL DAYS
GAD7 TOTAL SCORE: 10
7. FEELING AFRAID AS IF SOMETHING AWFUL MIGHT HAPPEN: SEVERAL DAYS

## 2023-06-02 ASSESSMENT — PATIENT HEALTH QUESTIONNAIRE - PHQ9
SUM OF ALL RESPONSES TO PHQ QUESTIONS 1-9: 9
SUM OF ALL RESPONSES TO PHQ QUESTIONS 1-9: 9
10. IF YOU CHECKED OFF ANY PROBLEMS, HOW DIFFICULT HAVE THESE PROBLEMS MADE IT FOR YOU TO DO YOUR WORK, TAKE CARE OF THINGS AT HOME, OR GET ALONG WITH OTHER PEOPLE: NOT DIFFICULT AT ALL

## 2023-06-02 NOTE — PATIENT INSTRUCTIONS
[facility]  [address] [place] [id number]  Office: [phone]   Fax:    [phone]     Please call if your trochanteric bursitis gets worse for a local steroid injection. Ok for a 20 min appointment with me.     Sister just went for genetic cancer counseling recently.  Pt declines referral for that right now. Feels like her plate is full right now.  She'll talk with her sister about it and let me know.       Preventive Health Recommendations  Female Ages 50 - 64    Yearly exam: See your health care provider every year in order to  Review health changes.   Discuss preventive care.    Review your medicines if your doctor has prescribed any.    Get a Pap test every three years (unless you have an abnormal result and your provider advises testing more often).  If you get Pap tests with HPV test, you only need to test every 5 years, unless you have an abnormal result.   You do not need a Pap test if your uterus was removed (hysterectomy) and you have not had cancer.  You should be tested each year for STDs (sexually transmitted diseases) if you're at risk.   Have a mammogram every 1 to 2 years.  Have a colonoscopy at age 50, or have a yearly FIT test (stool test). These exams screen for colon cancer.    Have a cholesterol test every 5 years, or more often if advised.  Have a diabetes test (fasting glucose) every three years. If you are at risk for diabetes, you should have this test more often.   If you are at risk for osteoporosis (brittle bone disease), think about having a bone density scan (DEXA).    Shots: Get a flu shot each year. Get a tetanus shot every 10 years.    Nutrition:   Eat at least 5 servings of fruits and vegetables each day.  Eat whole-grain bread, whole-wheat pasta and brown rice instead of white grains and rice.  Get adequate Calcium and Vitamin D.     Lifestyle  Exercise at least 150 minutes a week (30 minutes a day, 5 days a week). This will help you  "control your weight and prevent disease.  Limit alcohol to one drink per day.  No smoking.   Wear sunscreen to prevent skin cancer.   See your dentist every six months for an exam and cleaning.  See your eye doctor every 1 to 2 years.    Thank you so much or choosing [facility]  for your Health Care. It was a pleasure seeing you at your visit today! Please contact us with any questions or concerns you may have.                   Selena Barnes MD                              To reach your [facility] care team after hours call:   [phone] press #2 \"to speak with your care team\".  This will get you to our clinic instead of routing to central [facility]  scheduling.     PLEASE NOTE OUR HOURS HAVE CHANGED secondary to COVID-19 coronavirus pandemic, as we are trying to minimize patient exposure to the virus,  which is now widespread in the Critical access hospital.  These hours may change with very little notice.  We apologize for any inconvenience.       Our current clinic hours are:          Monday- Thursday   7:00am - 6:00pm  in person.      Friday  7:00am- 5:00pm                       Saturday and Sunday : Closed to in person and virtual visits        We have telephone and virtual visit times available between    7:00am - 6pm on Monday-Friday as well.                                                Phone:  [phone]      Our pharmacy hours: Monday through Friday 8:00am to 5:00pm                        Saturday - 9:00 am to 12 noon       Sunday : Closed.              Phone:  [phone]              ###  Please note: at this time we are not accepting any walk-in visits. ###      There is also information available at our web site:  [email or web address]    If your provider ordered any lab tests and you do not receive the results within 10 business days, please call the clinic.    If you need a medication refill please contact your pharmacy.  Please allow 3 business " days for your refill to be completed.    Our clinic offers telephone visits and e visits.  Please ask one of your team members to explain more.      Use MyChart (secure email communication and access to your chart) to send your primary care provider a message or make an appointment. Ask someone on your Team how to sign up for [facility].

## 2023-06-02 NOTE — PROGRESS NOTES
SUBJECTIVE:   CC: Beatris is an 52 year old who presents for preventive health visit and the following other medical problems:     1. Routine general medical examination at a health care facility    2. Hyperlipidemia LDL goal <130    3. Cervical cancer screening    4. Methylenetetrahydrofolate reductase deficiency (H)    5. MTHFR mutation - heterozygote    6. Adjustment disorder with mixed anxiety and depressed mood    7. Asymptomatic postmenopausal status -early menopause-  since 4/2012 - some mood swings, but scant hot flashes , no night sweats     8. Mild dysplasia of cervix    9. Trochanteric bursitis of both hips- left >> right     10. Low TSH level    11. Need for shingles vaccine            6/2/2023    12:36 PM   Additional Questions   Roomed by Marcella Bautista CMA   Accompanied by Self     Healthy Habits:     Getting at least 3 servings of Calcium per day:  Yes    Bi-annual eye exam:  Yes    Dental care twice a year:  Yes    Sleep apnea or symptoms of sleep apnea:  None    Diet:  Regular (no restrictions)    Frequency of exercise:  2-3 days/week    Duration of exercise:  30-45 minutes    Taking medications regularly:  Yes    Medication side effects:  None    PHQ-2 Total Score: 3    Additional concerns today:  Yes  feels like mood has been up and down lately:   Anxiety - has been high lately - lots of life stressors right now - sister, mom, kids, dog's surgery, job.  Crying more often and more easily.  Can do talk therapy through [employer] for whom she works.her district has   Feels like mood is going up and down.      Having difficulty falling asleep and staying asleep - can't fall asleep till midnight and then awakens at about  0230- sometimes can get back to sleep and sometimes can't.  Gets decreased appetite when she's stressed.  Hasn't lost weight.          6/2/2023    12:38 PM   Last PHQ-9   1.  Little interest or pleasure in doing things 1   2.  Feeling down, depressed, or hopeless 2   3.   "Trouble falling or staying asleep, or sleeping too much 2   4.  Feeling tired or having little energy 1   5.  Poor appetite or overeating 2   6.  Feeling bad about yourself 1   7.  Trouble concentrating 0   8.  Moving slowly or restless 0   Q9: Thoughts of better off dead/self-harm past 2 weeks 0   PHQ-9 Total Score 9         6/2/2023    12:38 PM   CAROL-7    1. Feeling nervous, anxious, or on edge 1   2. Not being able to stop or control worrying 3   3. Worrying too much about different things 3   4. Trouble relaxing 0   5. Being so restless that it is hard to sit still 1   6. Becoming easily annoyed or irritable 1   7. Feeling afraid, as if something awful might happen 1   CAROL-7 Total Score 10   If you checked any problems, how difficult have they made it for you to do your work, take care of things at home, or get along with other people? Somewhat difficult       Having neck and lower occipital area tightness and pain = tension headaches.  Feels like she needs to crack her neck.  Has been working with a PT at [facility] to help with this.  No numbness, tingling, or weakness in upper or lower extremities. No bowel or bladder control problems. Has a small  hemangioma T8 vertebral body - seen on MRI 2/15/2012 .  Also hx of some cervical spinal stenosis from MRI 2012:   \"MRI OF THE CERVICAL SPINE WITHOUT CONTRAST Feb 15, 2012 5:21 PM       HISTORY: Cervical radiculopathy.       TECHNIQUE: Multiplanar, multisequence MRI images of the cervical spine   were acquired without intravenous contrast.       COMPARISON: None.       FINDINGS: There is normal alignment of the cervical vertebrae.   Vertebral body heights of the cervical spine are well maintained.   Marrow signal throughout the cervical vertebrae is within normal   limits. There is no evidence for fracture or pathologic bony lesion of   the cervical spine.       There is loss of disc height, disc desiccation and posterior disc   bulging to varying degrees at all " "levels of the cervical spine with   exception of the relatively normal appearing C7-T1 disc.       The cervical spinal cord is mildly flattened by a posterior disc   osteophyte complex at the C5-C6 level; the cervical spinal cord is   otherwise normal in contour and signal intensity. There is no evidence   for intrathecal abnormality.       Level-by-level:       C2-C3: There is facet arthropathy bilaterally, uncinate hypertrophy   bilaterally and a posterior broad-based disc-osteophyte complex. There   is no spinal canal or neural foraminal stenosis at this level.       C3-C4: There is facet arthropathy bilaterally, uncinate hypertrophy   bilaterally and a posterior broad-based disc-osteophyte complex. There   is no spinal canal or neural foraminal stenosis at this level.       C4-C5: There is facet arthropathy bilaterally, uncinate hypertrophy   bilaterally and a posterior broad-based disc-osteophyte complex. These   findings result in mild spinal canal narrowing and minimal left   foraminal narrowing. There is no right foraminal stenosis.       C5-C6: There is facet arthropathy bilaterally, uncinate hypertrophy   bilaterally and a posterior broad-based disc-osteophyte complex. These   findings result in moderate left foraminal stenosis, mild right   foraminal narrowing and moderate spinal canal stenosis with mild   flattening of the cervical spinal cord.       C6-C7: There is facet arthropathy bilaterally, uncinate hypertrophy   bilaterally and a posterior broad-based disc-osteophyte complex. There   is no spinal canal or neural foraminal stenosis at this level.       C7-T1: There is no spinal canal or neural foraminal stenosis at this   level.       IMPRESSION: Degenerative changes of the cervical spine as described   Above.\"       Left trochanteric bursitis: better the last 2 weeks.   Better when she plays pickleball. She'll call if it gets worse for a local steroid injection.                    Social History "     Tobacco Use     Smoking status: Never     Smokeless tobacco: Never   Vaping Use     Vaping status: Not on file   Substance Use Topics     Alcohol use: Yes     Alcohol/week: 0.0 - 1.7 standard drinks of alcohol     Comment: maybe 0-1 weekly             6/2/2023    12:41 PM   Alcohol Use   Prescreen: >3 drinks/day or >7 drinks/week? No          View : No data to display.              Reviewed orders with patient.  Reviewed health maintenance and updated orders accordingly - Yes  Lab work is in process  Labs reviewed in EPIC  BP Readings from Last 3 Encounters:   06/02/23 138/60   03/02/22 138/88   12/16/20 110/82    Wt Readings from Last 3 Encounters:   06/02/23 74.1 kg (163 lb 4.8 oz)   03/02/22 75.3 kg (166 lb)   12/16/20 70.8 kg (156 lb)                  Patient Active Problem List   Diagnosis     Advanced directives, counseling/discussion     Multiple lipomas     Sebaceous cyst- left upper forehead      Syncope and collapse     Raynaud's phenomenon - noted 10/17/2011 at first visit      Cystocele with uterine descensus- mild w/ both      Cervical pain     Family history of clotting disorder- sister with MTHFR     Dermatitis- every winter - bilateral flanks and hips     MTHFR mutation - heterozygote     Headache     Chronic neck pain     Mild dysplasia of cervix     DDD (degenerative disc disease), cervical     DDD (degenerative disc disease), thoracic     Cervical spinal stenosis     Chronic constipation     Acute left-sided low back pain without sciatica     Asymptomatic postmenopausal status -early menopause-  since 4/2012 - some mood swings, but scant hot flashes , no night sweats      Dry eyes- sometimes increased watering and sometimes a bit painful in the am's     Hyperlipidemia LDL goal <130     Methylenetetrahydrofolate reductase deficiency (H)     Trochanteric bursitis of both hips- left >> right      Past Surgical History:   Procedure Laterality Date     BIOPSY  8/1995; 11/2017    needle biopsy of  mass in left breast;shave biopsy of scalp     COLONOSCOPY  2016    Dr. Diaz [facility]     COLONOSCOPY N/A 2016    Procedure: COLONOSCOPY;  Surgeon: Oscar Diaz MD;  Location: [facility]     EXCISE MASS HEAD  2012    Procedure: EXCISE MASS HEAD;  Excision Left Forehead Mass, Excision Right Posterior Thigh Mass, Excision Left Posterior Thigh Mass ;  Surgeon: Felix Trivedi MD;  Location: [facility]     EXCISE MASS LOWER EXTREMITY  2012    Procedure: EXCISE MASS LOWER EXTREMITY;;  Surgeon: Felix Trivedi MD;  Location: [facility]     wisdom teeth extraction         Social History     Tobacco Use     Smoking status: Never     Smokeless tobacco: Never   Vaping Use     Vaping status: Not on file   Substance Use Topics     Alcohol use: Yes     Alcohol/week: 0.0 - 1.7 standard drinks of alcohol     Comment: maybe 0-1 weekly     Family History   Problem Relation Age of Onset     Asthma Father      Hypertension Father      Cerebrovascular Disease Father      Prostate Cancer Father      Neurologic Disorder Father         Lewy Body dementia - broke hip-  at age 76      Cerebrovascular Disease Sister 47     Thyroid Disease Sister 68        hypothyroid      Blood Disease Sister 68        MTHFR deficiency - not factor V leiden      Cerebrovascular Disease Sister 15        secondary to MTHFR deficiency      Cancer Sister 30        Breast cancer dx'd 2012     Breast Cancer Sister      Blood Disease Sister      Cancer Sister      Depression Sister      Thyroid Disease Sister      Cerebrovascular Disease Sister      Breast Cancer Sister         just had genetic testing done 2019 - she's heterozygous MTHFR      Depression Sister      Thyroid Disease Sister      Endometrial Cancer Sister 51        Endometrioid adenocarcinoma grade 1     Other - See Comments Sister         heterozygous MTHFR      Colon Cancer Other         Maternal aunt         Current Outpatient Medications   Medication Sig Dispense  Refill     aspirin 81 MG tablet Take 1 tablet (81 mg) by mouth daily 30 tablet      cefPROZIL (CEFZIL) 500 MG tablet        childrens multivitamin with iron (FLINTSTONES COMPLETE) CHEW Take 2 tablets by mouth daily 100 tablet 4     cyclobenzaprine (FLEXERIL) 5 MG tablet Take 1-2 tablets (5-10 mg) by mouth 3 times daily as needed for muscle spasms 30 tablet 1     escitalopram (LEXAPRO) 5 MG tablet Take 1 tablet (5 mg) by mouth daily 30 tablet 5     triamcinolone (KENALOG) 0.1 % cream Apply topically 3 times daily as needed Apply sparingly to affected area. 80 g 5     VITAMIN D PO        No Known Allergies  Recent Labs   Lab Test 03/02/22  1617 12/29/20  1012 07/03/19  0828   * 171* 138*   HDL 67 63 58   TRIG 63 95 50   ALT 22 27 20   CR 0.79 0.70 0.72   GFRESTIMATED 90 >90 >90   GFRESTBLACK  --  >90 >90   POTASSIUM 4.1 4.3 4.2   TSH 0.48 1.11 1.25        Breast Cancer Screening:  Any new diagnosis of family breast, ovarian, or bowel cancer? No    FHS-7:       2/27/2022    11:36 AM 3/21/2022     3:36 PM 3/31/2023     2:32 PM 6/2/2023    12:42 PM   Breast CA Risk Assessment (FHS-7)   Did any of your first-degree relatives have breast or ovarian cancer? Yes Yes Yes Yes   Did any of your relatives have bilateral breast cancer? No No No No   Did any man in your family have breast cancer? No No No No   Did any woman in your family have breast and ovarian cancer? Yes No No Yes   Did any woman in your family have breast cancer before age 50 y? Yes Yes Yes Yes   Do you have 2 or more relatives with breast and/or ovarian cancer? Yes No Yes Yes   Do you have 2 or more relatives with breast and/or bowel cancer? Yes Yes Yes Yes      Sister just went for genetic cancer counseling recently.  Pt declines referral for that right now. Feels like her plate is full right now.  She'll talk with her sister about it and let me know.     Mammogram Screening: Recommended annual mammography  Pertinent mammograms are reviewed under the  imaging tab.    History of abnormal Pap smear: YES - updated in Problem List and Health Maintenance accordingly      Latest Ref Rng & Units 6/2/2023     2:10 PM 3/2/2022     3:43 PM 12/16/2020     6:20 PM   PAP / HPV   PAP  Atypical squamous cells of undetermined significance (ASC-US)   Negative for Intraepithelial Lesion or Malignancy (NILM)      HPV 16 DNA Negative Negative   Negative   Negative     HPV 18 DNA Negative Negative   Negative   Negative     Other HR HPV Negative Negative   Negative   Negative       Reviewed and updated as needed this visit by clinical staff   Tobacco  Allergies  Meds  Problems  Med Hx  Surg Hx  Fam Hx          Reviewed and updated as needed this visit by Provider   Tobacco  Allergies  Meds  Problems  Med Hx  Surg Hx  Fam Hx         Past Medical History:   Diagnosis Date     Arthritis 2010    osteoarthritis...     ASCUS favor benign 2011, 2014    neg HPV Plan cotest in 3 yrs.     Cellulitis of right leg w/ abrasion 9/4/2012     Cervicalgia      Headache      Left ankle injury 9/4/2012     MTHFR mutation     heterozygote - referred to hematology     Pap smear cannot exclude high grade squamous intraepithelial lesion (ASC-H) 03/10/2017    03/10/17: ASC-H pap, Neg HR HPV result.      Puncture wound of left lower leg with complication- cellulitis 9/4/2012     Vertigo summer 2006    rehydrated over night in hosp = better       Past Surgical History:   Procedure Laterality Date     BIOPSY  8/1995; 11/2017    needle biopsy of mass in left breast;shave biopsy of scalp     COLONOSCOPY  2/11/2016    Dr. Diaz [facility]     COLONOSCOPY N/A 2/11/2016    Procedure: COLONOSCOPY;  Surgeon: Oscar Diaz MD;  Location: [facility]     EXCISE MASS HEAD  12/11/2012    Procedure: EXCISE MASS HEAD;  Excision Left Forehead Mass, Excision Right Posterior Thigh Mass, Excision Left Posterior Thigh Mass ;  Surgeon: Felix Trivedi MD;  Location: [facility]     EXCISE MASS LOWER EXTREMITY   "2012    Procedure: EXCISE MASS LOWER EXTREMITY;;  Surgeon: Felix Trivedi MD;  Location: [facility]     wisdom teeth extraction       OB History    Para Term  AB Living   4 4 4 0 0 4   SAB IAB Ectopic Multiple Live Births   0 0 0 0 0      # Outcome Date GA Lbr Clement/2nd Weight Sex Delivery Anes PTL Lv   4 Term            3 Term            2 Term            1 Term               Obstetric Comments    x 4 - no complications with any pregnancies , labors or deliveries       Review of Systems   Constitutional: Negative for chills and fever.   HENT: Positive for ear pain. Negative for congestion, hearing loss and sore throat.    Eyes: Positive for pain and visual disturbance.   Respiratory: Negative for cough and shortness of breath.    Cardiovascular: Positive for peripheral edema. Negative for chest pain and palpitations.   Gastrointestinal: Negative for abdominal pain, constipation, diarrhea, heartburn, hematochezia and nausea.   Breasts:  Negative for tenderness, breast mass and discharge.   Genitourinary: Negative for dysuria, frequency, genital sores, hematuria, pelvic pain, urgency, vaginal bleeding and vaginal discharge.   Musculoskeletal: Positive for arthralgias and joint swelling. Negative for myalgias.   Skin: Negative for rash.   Neurological: Positive for headaches. Negative for dizziness, weakness and paresthesias.   Psychiatric/Behavioral: Positive for mood changes. The patient is nervous/anxious.           OBJECTIVE:   /60   Pulse 78   Temp 97.7  F (36.5  C) (Tympanic)   Resp 14   Ht 1.664 m (5' 5.5\")   Wt 74.1 kg (163 lb 4.8 oz)   LMP 2015   SpO2 99%   BMI 26.76 kg/m    Physical Exam:   GENERAL APPEARANCE: healthy, alert and in some emotional, teary distress initially today , started smiling and laughing toward end of visit.   EYES: Eyes grossly normal to inspection, PERRL and conjunctivae and sclerae normal  HENT: ear canals and TM's normal, nose and mouth " without ulcers or lesions, oropharynx clear and oral mucous membranes moist  NECK: no adenopathy, no asymmetry, masses, or scars and thyroid normal to palpation  RESP: lungs clear to auscultation - no rales, rhonchi or wheezes  BREAST: normal without masses, tenderness or nipple discharge and no palpable axillary masses or adenopathy  CV: regular rate and rhythm, normal S1 S2, no S3 or S4, no murmur, click or rub, no peripheral edema and peripheral pulses strong  ABDOMEN: soft, nontender, no hepatosplenomegaly, no masses and bowel sounds normal   (female): normal female external genitalia, normal urethral meatus, vaginal mucosal atrophy noted, normal cervix, adnexae, and uterus without masses or abnormal discharge  MS: no musculoskeletal defects are noted and gait is age appropriate without ataxia  SKIN: no suspicious lesions or rashes  NEURO: Normal strength and tone, sensory exam grossly normal, mentation intact and speech normal  PSYCH: mentation appears normal and affect normal/bright    Diagnostic Test Results:  Labs reviewed in Epic    ASSESSMENT/PLAN:       ICD-10-CM    1. Routine general medical examination at a health care facility  Z00.00       2. Hyperlipidemia LDL goal <130-needs lab followup   E78.5 Lipid panel reflex to direct LDL Fasting     Comprehensive metabolic panel      3. Cervical cancer screening  Z12.4 Pap screen with HPV - recommended age 30 - 65 years     HPV Hold (Lab Only)     HPV High Risk Types DNA Cervical      4. Methylenetetrahydrofolate reductase deficiency (H)  E72.12 childrens multivitamin with iron (FLINTSTONES COMPLETE) CHEW      5. MTHFR mutation - heterozygote  Z15.89 childrens multivitamin with iron (FLINTSTONES COMPLETE) CHEW      6. Adjustment disorder with mixed anxiety and depressed mood  F43.23 Adult Mental Health  Referral     escitalopram (LEXAPRO) 5 MG tablet      7. Asymptomatic postmenopausal status -early menopause-  since 4/2012 - some mood swings, but  scant hot flashes , no night sweats   Z78.0 DX Hip/Pelvis/Spine     CBC with platelets      8. Mild dysplasia of cervix  N87.0 Pap screen with HPV - recommended age 30 - 65 years     HPV Hold (Lab Only)     HPV High Risk Types DNA Cervical      9. Trochanteric bursitis of both hips- left >> right   M70.61     M70.62       10. Low TSH level-needs lab followup   R79.89 TSH with free T4 reflex     T3, total      11. Need for shingles vaccine  Z23 zoster vaccine recombinant adjuvanted (SHINGRIX) injection          Patient has been advised of split billing requirements and indicates understanding: Yes      COUNSELING:  Reviewed preventive health counseling, as reflected in patient instructions        She reports that she has never smoked. She has never used smokeless tobacco.    Return in 4 weeks (on 6/30/2023) for fasting lab only appointment, then with me 7/10/2023 , depression/anxiety, as a video visit.          Selena Barnes MD  [facility] [place]  Answers for HPI/ROS submitted by the patient on 6/2/2023  If you checked off any problems, how difficult have these problems made it for you to do your work, take care of things at home, or get along with other people?: Not difficult at all  PHQ9 TOTAL SCORE: 9  CAROL 7 TOTAL SCORE: 10

## 2023-06-08 LAB
BKR LAB AP GYN ADEQUACY: ABNORMAL
BKR LAB AP GYN INTERPRETATION: ABNORMAL
BKR LAB AP HPV REFLEX: ABNORMAL
BKR LAB AP LMP: ABNORMAL
BKR LAB AP PREVIOUS ABNL DX: ABNORMAL
BKR LAB AP PREVIOUS ABNORMAL: ABNORMAL
PATH REPORT.COMMENTS IMP SPEC: ABNORMAL
PATH REPORT.COMMENTS IMP SPEC: ABNORMAL
PATH REPORT.RELEVANT HX SPEC: ABNORMAL

## 2023-06-12 ENCOUNTER — PATIENT OUTREACH (OUTPATIENT)
Dept: FAMILY MEDICINE | Facility: CLINIC | Age: 53
End: 2023-06-12
Payer: COMMERCIAL

## 2023-06-12 DIAGNOSIS — N87.0 MILD DYSPLASIA OF CERVIX: Primary | ICD-10-CM

## 2023-06-30 ENCOUNTER — LAB (OUTPATIENT)
Dept: LAB | Facility: CLINIC | Age: 53
End: 2023-06-30
Payer: COMMERCIAL

## 2023-06-30 DIAGNOSIS — Z78.0 ASYMPTOMATIC POSTMENOPAUSAL STATUS: ICD-10-CM

## 2023-06-30 DIAGNOSIS — E78.5 HYPERLIPIDEMIA LDL GOAL <130: ICD-10-CM

## 2023-06-30 DIAGNOSIS — R79.89 LOW TSH LEVEL: ICD-10-CM

## 2023-06-30 LAB
ALBUMIN SERPL BCG-MCNC: 4.2 G/DL (ref 3.5–5.2)
ALP SERPL-CCNC: 124 U/L (ref 35–104)
ALT SERPL W P-5'-P-CCNC: 18 U/L (ref 0–50)
ANION GAP SERPL CALCULATED.3IONS-SCNC: 9 MMOL/L (ref 7–15)
AST SERPL W P-5'-P-CCNC: 22 U/L (ref 0–45)
BILIRUB SERPL-MCNC: 0.3 MG/DL
BUN SERPL-MCNC: 14.4 MG/DL (ref 6–20)
CALCIUM SERPL-MCNC: 10.1 MG/DL (ref 8.6–10)
CHLORIDE SERPL-SCNC: 104 MMOL/L (ref 98–107)
CHOLEST SERPL-MCNC: 218 MG/DL
CREAT SERPL-MCNC: 0.81 MG/DL (ref 0.51–0.95)
DEPRECATED HCO3 PLAS-SCNC: 27 MMOL/L (ref 22–29)
ERYTHROCYTE [DISTWIDTH] IN BLOOD BY AUTOMATED COUNT: 13.1 % (ref 10–15)
GFR SERPL CREATININE-BSD FRML MDRD: 86 ML/MIN/1.73M2
GLUCOSE SERPL-MCNC: 85 MG/DL (ref 70–99)
HCT VFR BLD AUTO: 43.6 % (ref 35–47)
HDLC SERPL-MCNC: 55 MG/DL
HGB BLD-MCNC: 14.1 G/DL (ref 11.7–15.7)
LDLC SERPL CALC-MCNC: 149 MG/DL
MCH RBC QN AUTO: 28.4 PG (ref 26.5–33)
MCHC RBC AUTO-ENTMCNC: 32.3 G/DL (ref 31.5–36.5)
MCV RBC AUTO: 88 FL (ref 78–100)
NONHDLC SERPL-MCNC: 163 MG/DL
PLATELET # BLD AUTO: 314 10E3/UL (ref 150–450)
POTASSIUM SERPL-SCNC: 5 MMOL/L (ref 3.4–5.3)
PROT SERPL-MCNC: 6.9 G/DL (ref 6.4–8.3)
RBC # BLD AUTO: 4.97 10E6/UL (ref 3.8–5.2)
SODIUM SERPL-SCNC: 140 MMOL/L (ref 136–145)
T3 SERPL-MCNC: 96 NG/DL (ref 85–202)
TRIGL SERPL-MCNC: 70 MG/DL
TSH SERPL DL<=0.005 MIU/L-ACNC: 0.95 UIU/ML (ref 0.3–4.2)
WBC # BLD AUTO: 5.7 10E3/UL (ref 4–11)

## 2023-06-30 PROCEDURE — 84443 ASSAY THYROID STIM HORMONE: CPT

## 2023-06-30 PROCEDURE — 36415 COLL VENOUS BLD VENIPUNCTURE: CPT

## 2023-06-30 PROCEDURE — 84480 ASSAY TRIIODOTHYRONINE (T3): CPT

## 2023-06-30 PROCEDURE — 85027 COMPLETE CBC AUTOMATED: CPT

## 2023-06-30 PROCEDURE — 80053 COMPREHEN METABOLIC PANEL: CPT

## 2023-06-30 PROCEDURE — 80061 LIPID PANEL: CPT

## 2023-07-10 ENCOUNTER — VIRTUAL VISIT (OUTPATIENT)
Dept: FAMILY MEDICINE | Facility: CLINIC | Age: 53
End: 2023-07-10
Payer: COMMERCIAL

## 2023-07-10 DIAGNOSIS — E72.12 METHYLENETETRAHYDROFOLATE REDUCTASE DEFICIENCY (H): ICD-10-CM

## 2023-07-10 DIAGNOSIS — F43.23 ADJUSTMENT DISORDER WITH MIXED ANXIETY AND DEPRESSED MOOD: Primary | ICD-10-CM

## 2023-07-10 PROCEDURE — 99213 OFFICE O/P EST LOW 20 MIN: CPT | Mod: VID | Performed by: FAMILY MEDICINE

## 2023-07-10 RX ORDER — PAROXETINE 10 MG/1
10 TABLET, FILM COATED ORAL EVERY EVENING
Qty: 45 TABLET | Refills: 1 | Status: SHIPPED | OUTPATIENT
Start: 2023-07-10 | End: 2023-08-21 | Stop reason: SINTOL

## 2023-07-10 ASSESSMENT — ANXIETY QUESTIONNAIRES
5. BEING SO RESTLESS THAT IT IS HARD TO SIT STILL: SEVERAL DAYS
4. TROUBLE RELAXING: SEVERAL DAYS
6. BECOMING EASILY ANNOYED OR IRRITABLE: MORE THAN HALF THE DAYS
GAD7 TOTAL SCORE: 7
2. NOT BEING ABLE TO STOP OR CONTROL WORRYING: SEVERAL DAYS
GAD7 TOTAL SCORE: 7
IF YOU CHECKED OFF ANY PROBLEMS ON THIS QUESTIONNAIRE, HOW DIFFICULT HAVE THESE PROBLEMS MADE IT FOR YOU TO DO YOUR WORK, TAKE CARE OF THINGS AT HOME, OR GET ALONG WITH OTHER PEOPLE: SOMEWHAT DIFFICULT
7. FEELING AFRAID AS IF SOMETHING AWFUL MIGHT HAPPEN: NOT AT ALL
1. FEELING NERVOUS, ANXIOUS, OR ON EDGE: SEVERAL DAYS
3. WORRYING TOO MUCH ABOUT DIFFERENT THINGS: SEVERAL DAYS

## 2023-07-10 ASSESSMENT — PATIENT HEALTH QUESTIONNAIRE - PHQ9: SUM OF ALL RESPONSES TO PHQ QUESTIONS 1-9: 7

## 2023-07-10 NOTE — PATIENT INSTRUCTIONS
" [facility]  [address] [place] [address]  Office: [phone]   Fax:    [phone]     Please, call our clinic or go to the ER immediately if signs or symptoms worsen or fail to improve as anticipated.     Stop your escitalopram and start on paroxetine 10mg daily.       Return in 6 weeks (on 8/21/2023) for depression, anxiety, w/ Dr Barnes.     Future Appointments 7/10/2023 - 1/6/2024        Date Visit Type Length Department Provider     8/21/2023  8:40 AM OFFICE VISIT 20 min [facility][1] Selena Barnes MD    Location Instructions:     [facility] is located at [address], [address] Free parking is available; access the lot by turning north from [address] onto [address], then west onto [address].                             Thank you so much for doing a video visit with us today. And, again, thank you  for choosing our [facility].  Please contact us with any questions that you may have.   We appreciate the opportunity to serve you now and look forward to supporting your healthcare needs for a long time to come!    Our clinic for the foreseeable future and until further notice , will continue to offer virtual visits, including telephone visits, video visits,  and e-visits, in addition to in person visits,  especially during this period of COVID-19 coronavirus pandemic.  Please call to schedule another one if you need it!        Most Sincerely,     Selena Barnes MD                                              To reach your [facility] care team after hours call:   [phone] press #[phone] \"to speak with your care team\".  This will get you to our clinic instead of routing to central [facility]  scheduling.     PLEASE NOTE OUR HOURS HAVE CHANGED secondary to COVID-19 coronavirus pandemic, as we are trying to minimize patient " exposure to the virus,  which is now widespread in the state.  These hours may change with very little notice.  We apologize for any inconvenience.       Our current clinic hours are:         Monday- Thursday   7:00am - 6:00pm  in person.      Friday  7:00am- 5:00pm in person                       Saturday and Sunday : Closed to in person and virtual visits            We have telephone and virtual visit times available between 7:00am - 6pm on             Monday-Friday as well.                                                Phone:  [phone]    Our pharmacy hours: Monday  through Friday 8:00am to 5:00pm                        Saturday - 9:00 am to 12 noon         Sunday : Closed.     ###  Please note: at this time we are not accepting any walk-in visits. ###      There is also information available at our web site:  [email or web address]    If your provider ordered any lab tests and you do not receive the results within 10 business days, please call the clinic.    If you need a medication refill please contact your pharmacy.  Please allow 3 business days for your refill to be completed.    Our clinic offers telephone visits and e visits.  Please ask one of your team members to explain more.      Use [email or web address] (secure email communication and access to your chart) to send your primary care provider a message or make an appointment. Ask someone on your Team how to sign up for [employer].

## 2023-07-10 NOTE — PROGRESS NOTES
"Beatris is a 53 year old who is being evaluated via a billable video visit.      How would you like to obtain your AVS? MyChart  If the video visit is dropped, the invitation should be resent by: Text to cell phone: [phone]  Will anyone else be joining your video visit? No        Assessment & Plan :       ICD-10-CM    1. Adjustment disorder with mixed anxiety and depressed mood  F43.23 PARoxetine (PAXIL) 10 MG tablet      2. Methylenetetrahydrofolate reductase deficiency (H)  E72.12          Please, call our clinic or go to the ER immediately if signs or symptoms worsen or fail to improve as anticipated.     Stop your escitalopram and start on paroxetine 10mg daily.       Return in 6 weeks (on 8/21/2023) for depression, anxiety, w/ Dr Barnes.   Future Appointments 7/10/2023 - 1/6/2024      Date Visit Type Length Department Provider     8/21/2023  8:40 AM OFFICE VISIT 20 min  FAMILY PRACTICE Selena Barnes MD    Location Instructions:     [facility] is located at [address] [place] [address], along [address] Free parking is available; access the lot by turning north from [address] onto [facility], then west onto [facility].                   Ordering of each unique test  Prescription drug management  20 minutes spent by me on the date of the encounter doing chart review, history and exam, documentation and further activities per the note  16     BMI:   Estimated body mass index is 26.76 kg/m  as calculated from the following:    Height as of 6/2/23: 1.664 m (5' 5.5\").    Weight as of 6/2/23: 74.1 kg (163 lb 4.8 oz).   Weight management plan: Discussed healthy diet and exercise guidelines    MEDICATIONS:   Orders Placed This Encounter   Medications     PARoxetine (PAXIL) 10 MG tablet     Sig: Take 1 tablet (10 mg) by mouth every evening     Dispense:  45 tablet     Refill:  1          - Continue other medications without change  Work on weight loss  Regular " "exercise  See Patient Instructions         Selena Barnes MD  [facility] [place] Spear is a 53 year old, presenting for the following health issues:  Recheck Medication (Possible changes in medication)    1. Methylenetetrahydrofolate reductase deficiency (H)            6/2/2023    12:36 PM   Additional Questions   Roomed by Marcella Bautista CMA   Accompanied by Self     History of Present Illness       Mental Health Follow-up:  Patient presents to follow-up on Depression & Anxiety.Patient's depression since last visit has been:  Medium  The patient is not having other symptoms associated with depression.  Patient's anxiety since last visit has been:  Medium  The patient is not having other symptoms associated with anxiety.  Any significant life events: other  Patient is feeling anxious or having panic attacks.  Patient has no concerns about alcohol or drug use.    She eats 2-3 servings of fruits and vegetables daily.She consumes 1 sweetened beverage(s) daily.She exercises with enough effort to increase her heart rate 30 to 60 minutes per day.  She exercises with enough effort to increase her heart rate 7 days per week.   She is taking medications regularly.  Today's CAROL-7 Score: 7    Feeling a bit \"flat \" moodwise since starting her medication . sleeping too much with really restless sleep as well , some bizarre dreams , not nightmares .  Pt would like to make a med change. No sexual side effects at all.      Social History     Tobacco Use     Smoking status: Never     Smokeless tobacco: Never   Substance Use Topics     Alcohol use: Yes     Alcohol/week: 0.0 - 1.7 standard drinks of alcohol     Comment: maybe 0-1 weekly     Drug use: No     Comment: no herbal meds either          6/28/2019     5:06 PM 6/2/2023    12:38 PM 7/10/2023     9:41 AM   PHQ   PHQ-9 Total Score 10 9 7   Q9: Thoughts of better off dead/self-harm past 2 weeks Not at all Not at all Not at all         " 6/28/2019     5:06 PM 6/2/2023    12:38 PM 7/10/2023     8:35 AM   CAROL-7 SCORE   Total Score  10 (moderate anxiety) 7 (mild anxiety)   Total Score 9 10 7         7/10/2023     9:41 AM   Last PHQ-9   1.  Little interest or pleasure in doing things 2   2.  Feeling down, depressed, or hopeless 1   3.  Trouble falling or staying asleep, or sleeping too much 1   4.  Feeling tired or having little energy 2   5.  Poor appetite or overeating 0   6.  Feeling bad about yourself 0   7.  Trouble concentrating 1   8.  Moving slowly or restless 0   Q9: Thoughts of better off dead/self-harm past 2 weeks 0   PHQ-9 Total Score 7   Difficulty at work, home, or with people Very difficult         7/10/2023     8:35 AM   CAROL-7    1. Feeling nervous, anxious, or on edge 1   2. Not being able to stop or control worrying 1   3. Worrying too much about different things 1   4. Trouble relaxing 1   5. Being so restless that it is hard to sit still 1   6. Becoming easily annoyed or irritable 2   7. Feeling afraid, as if something awful might happen 0   CAROL-7 Total Score 7   If you checked any problems, how difficult have they made it for you to do your work, take care of things at home, or get along with other people? Somewhat difficult     Has never been on any other medications.    Menopause symptoms - minimal.     Suicide Assessment Five-step Evaluation and Treatment (SAFE-T)    Patient Active Problem List   Diagnosis     Advanced directives, counseling/discussion     Multiple lipomas     Sebaceous cyst- left upper forehead      Syncope and collapse     Raynaud's phenomenon - noted 10/17/2011 at first visit      Cystocele with uterine descensus- mild w/ both      Cervical pain     Family history of clotting disorder- sister with MTHFR     Dermatitis- every winter - bilateral flanks and hips     MTHFR mutation - heterozygote     Headache     Chronic neck pain     Mild dysplasia of cervix     DDD (degenerative disc disease), cervical     DDD  (degenerative disc disease), thoracic     Cervical spinal stenosis     Chronic constipation     Acute left-sided low back pain without sciatica     Asymptomatic postmenopausal status -early menopause-  since 4/2012 - some mood swings, but scant hot flashes , no night sweats      Dry eyes- sometimes increased watering and sometimes a bit painful in the am's     Hyperlipidemia LDL goal <130     Methylenetetrahydrofolate reductase deficiency (H)     Trochanteric bursitis of both hips- left >> right      Adjustment disorder with mixed anxiety and depressed mood- escitalopram 5mg daily - really blunted mood       Current Outpatient Medications   Medication Sig Dispense Refill     aspirin 81 MG tablet Take 1 tablet (81 mg) by mouth daily 30 tablet      cefPROZIL (CEFZIL) 500 MG tablet        childrens multivitamin with iron (FLINTSTONES COMPLETE) CHEW Take 2 tablets by mouth daily 100 tablet 4     cyclobenzaprine (FLEXERIL) 5 MG tablet Take 1-2 tablets (5-10 mg) by mouth 3 times daily as needed for muscle spasms 30 tablet 1     escitalopram (LEXAPRO) 5 MG tablet Take 1 tablet (5 mg) by mouth daily 30 tablet 5     PARoxetine (PAXIL) 10 MG tablet Take 1 tablet (10 mg) by mouth every evening 45 tablet 1     triamcinolone (KENALOG) 0.1 % cream Apply topically 3 times daily as needed Apply sparingly to affected area. 80 g 5     VITAMIN D PO           No Known Allergies       Review of Systems :   Constitutional, HEENT, cardiovascular, pulmonary, GI, , musculoskeletal, neuro, skin, endocrine and psych systems are negative, except as otherwise noted.      Objective  :      Vitals:  No vitals were obtained today due to virtual visit.    Physical Exam :   GENERAL: Healthy, alert and no distress  EYES: Eyes grossly normal to inspection.  No discharge or erythema, or obvious scleral/conjunctival abnormalities.  RESP: No audible wheeze, cough, or visible cyanosis.  No visible retractions or increased work of breathing.    SKIN:  Visible skin clear. No significant rash, abnormal pigmentation or lesions.  NEURO: Cranial nerves grossly intact.  Mentation and speech appropriate for age.  PSYCH: Mentation appears normal, affect normal/bright, judgement and insight intact, normal speech and appearance well-groomed.    Lab on 06/30/2023   Component Date Value Ref Range Status     TSH 06/30/2023 0.95  0.30 - 4.20 uIU/mL Final     Cholesterol 06/30/2023 218 (H)  <200 mg/dL Final     Triglycerides 06/30/2023 70  <150 mg/dL Final     Direct Measure HDL 06/30/2023 55  >=50 mg/dL Final     LDL Cholesterol Calculated 06/30/2023 149 (H)  <=100 mg/dL Final     Non HDL Cholesterol 06/30/2023 163 (H)  <130 mg/dL Final     Sodium 06/30/2023 140  136 - 145 mmol/L Final     Potassium 06/30/2023 5.0  3.4 - 5.3 mmol/L Final     Chloride 06/30/2023 104  98 - 107 mmol/L Final     Carbon Dioxide (CO2) 06/30/2023 27  22 - 29 mmol/L Final     Anion Gap 06/30/2023 9  7 - 15 mmol/L Final     Urea Nitrogen 06/30/2023 14.4  6.0 - 20.0 mg/dL Final     Creatinine 06/30/2023 0.81  0.51 - 0.95 mg/dL Final     Calcium 06/30/2023 10.1 (H)  8.6 - 10.0 mg/dL Final     Glucose 06/30/2023 85  70 - 99 mg/dL Final     Alkaline Phosphatase 06/30/2023 124 (H)  35 - 104 U/L Final     AST 06/30/2023 22  0 - 45 U/L Final    Reference intervals for this test were updated on 6/12/2023 to more accurately reflect our healthy population. There may be differences in the flagging of prior results with similar values performed with this method. Interpretation of those prior results can be made in the context of the updated reference intervals.     ALT 06/30/2023 18  0 - 50 U/L Final    Reference intervals for this test were updated on 6/12/2023 to more accurately reflect our healthy population. There may be differences in the flagging of prior results with similar values performed with this method. Interpretation of those prior results can be made in the context of the updated reference  intervals.       Protein Total 06/30/2023 6.9  6.4 - 8.3 g/dL Final     Albumin 06/30/2023 4.2  3.5 - 5.2 g/dL Final     Bilirubin Total 06/30/2023 0.3  <=1.2 mg/dL Final     GFR Estimate 06/30/2023 86  >60 mL/min/1.73m2 Final     WBC Count 06/30/2023 5.7  4.0 - 11.0 10e3/uL Final     RBC Count 06/30/2023 4.97  3.80 - 5.20 10e6/uL Final     Hemoglobin 06/30/2023 14.1  11.7 - 15.7 g/dL Final     Hematocrit 06/30/2023 43.6  35.0 - 47.0 % Final     MCV 06/30/2023 88  78 - 100 fL Final     MCH 06/30/2023 28.4  26.5 - 33.0 pg Final     MCHC 06/30/2023 32.3  31.5 - 36.5 g/dL Final     RDW 06/30/2023 13.1  10.0 - 15.0 % Final     Platelet Count 06/30/2023 314  150 - 450 10e3/uL Final     T3 Total 06/30/2023 96  85 - 202 ng/dL Final             Video-Visit Details:     Type of service:  Video Visit   Start time: 9:40am  Stop time: 9:53am.   Total time on video 13 minutes.     Originating Location (pt. Location): Home  Distant Location (provider location):  On-site  Platform used for Video Visit: Paul

## 2023-07-23 DIAGNOSIS — E83.52 SERUM CALCIUM ELEVATED: Primary | ICD-10-CM

## 2023-07-23 DIAGNOSIS — E78.00 ELEVATED LDL CHOLESTEROL LEVEL: ICD-10-CM

## 2023-07-23 DIAGNOSIS — E78.5 HYPERLIPIDEMIA LDL GOAL <130: Primary | ICD-10-CM

## 2023-07-23 DIAGNOSIS — R74.8 ELEVATED ALKALINE PHOSPHATASE LEVEL: ICD-10-CM

## 2023-08-13 ENCOUNTER — MYC MEDICAL ADVICE (OUTPATIENT)
Dept: FAMILY MEDICINE | Facility: CLINIC | Age: 53
End: 2023-08-13
Payer: COMMERCIAL

## 2023-08-14 NOTE — TELEPHONE ENCOUNTER
My chart message sent     Awaiting reply     Fallon Lee RN, BSN  [facility]

## 2023-08-14 NOTE — TELEPHONE ENCOUNTER
Please see my chart message below     Please review and advise     Thank you     Fallon Lee RN, BSN  [facility]

## 2023-08-18 ENCOUNTER — TRANSFERRED RECORDS (OUTPATIENT)
Dept: HEALTH INFORMATION MANAGEMENT | Facility: CLINIC | Age: 53
End: 2023-08-18
Payer: COMMERCIAL

## 2023-08-20 ASSESSMENT — PATIENT HEALTH QUESTIONNAIRE - PHQ9
SUM OF ALL RESPONSES TO PHQ QUESTIONS 1-9: 12
10. IF YOU CHECKED OFF ANY PROBLEMS, HOW DIFFICULT HAVE THESE PROBLEMS MADE IT FOR YOU TO DO YOUR WORK, TAKE CARE OF THINGS AT HOME, OR GET ALONG WITH OTHER PEOPLE: VERY DIFFICULT
SUM OF ALL RESPONSES TO PHQ QUESTIONS 1-9: 12

## 2023-08-20 ASSESSMENT — ANXIETY QUESTIONNAIRES
7. FEELING AFRAID AS IF SOMETHING AWFUL MIGHT HAPPEN: SEVERAL DAYS
2. NOT BEING ABLE TO STOP OR CONTROL WORRYING: SEVERAL DAYS
5. BEING SO RESTLESS THAT IT IS HARD TO SIT STILL: NOT AT ALL
4. TROUBLE RELAXING: NOT AT ALL
1. FEELING NERVOUS, ANXIOUS, OR ON EDGE: MORE THAN HALF THE DAYS
IF YOU CHECKED OFF ANY PROBLEMS ON THIS QUESTIONNAIRE, HOW DIFFICULT HAVE THESE PROBLEMS MADE IT FOR YOU TO DO YOUR WORK, TAKE CARE OF THINGS AT HOME, OR GET ALONG WITH OTHER PEOPLE: SOMEWHAT DIFFICULT
GAD7 TOTAL SCORE: 9
3. WORRYING TOO MUCH ABOUT DIFFERENT THINGS: MORE THAN HALF THE DAYS
GAD7 TOTAL SCORE: 9
6. BECOMING EASILY ANNOYED OR IRRITABLE: NEARLY EVERY DAY

## 2023-08-21 ENCOUNTER — VIRTUAL VISIT (OUTPATIENT)
Dept: FAMILY MEDICINE | Facility: CLINIC | Age: 53
End: 2023-08-21
Payer: COMMERCIAL

## 2023-08-21 DIAGNOSIS — F43.23 ADJUSTMENT DISORDER WITH MIXED ANXIETY AND DEPRESSED MOOD: ICD-10-CM

## 2023-08-21 DIAGNOSIS — H81.10 BENIGN PAROXYSMAL POSITIONAL VERTIGO, UNSPECIFIED LATERALITY: Primary | ICD-10-CM

## 2023-08-21 PROCEDURE — 99214 OFFICE O/P EST MOD 30 MIN: CPT | Mod: VID | Performed by: FAMILY MEDICINE

## 2023-08-21 RX ORDER — SERTRALINE HYDROCHLORIDE 25 MG/1
TABLET, FILM COATED ORAL
Qty: 45 TABLET | Refills: 1 | Status: SHIPPED | OUTPATIENT
Start: 2023-08-21 | End: 2023-11-20

## 2023-08-21 NOTE — TELEPHONE ENCOUNTER
Recommend in person or at least video visit to discuss pt's symptoms - visit this week, please.     Please assist pt in making appt(s) for the above - for a pain in her neck with vertigo symptoms with nausea.

## 2023-08-21 NOTE — PROGRESS NOTES
Beatris is a 53 year old who is being evaluated via a billable video visit.      How would you like to obtain your AVS? MyChart  If the video visit is dropped, the invitation should be resent by: Text to cell phone: [phone]  Will anyone else be joining your video visit? No          Assessment & Plan :       ICD-10-CM    1. Benign paroxysmal positional vertigo, unspecified laterality  H81.10 Physical Therapy Referral      2. Adjustment disorder with mixed anxiety and depressed mood- escitalopram 5mg daily - really blunted mood  F43.23 sertraline (ZOLOFT) 25 MG tablet          Pt will call her eye doctor to get her eyes checked - if that doesn't resolve her above symptoms with vestibular therapy, she will call ASAP for Brain MRI.      Sertraline 25mg for mood - try 1/2 tablet for 2 weeks, then increase to 1 full tablet daily  - would try after nausea and vertigo improved/resolved.  Rx put on hold at [facility] per pt request.   Ordering of each unique test  Prescription drug management   27 minutes spent by me on the date of the encounter doing chart review, history and exam, documentation and further activities per the note.        Depression Screening Follow Up:         8/20/2023    10:06 PM   PHQ   PHQ-9 Total Score 12   Q9: Thoughts of better off dead/self-harm past 2 weeks Not at all         8/20/2023    10:06 PM   Last PHQ-9   1.  Little interest or pleasure in doing things 2   2.  Feeling down, depressed, or hopeless 1   3.  Trouble falling or staying asleep, or sleeping too much 3   4.  Feeling tired or having little energy 2   5.  Poor appetite or overeating 1   6.  Feeling bad about yourself 1   7.  Trouble concentrating 1   8.  Moving slowly or restless 1   Q9: Thoughts of better off dead/self-harm past 2 weeks 0   PHQ-9 Total Score 12       Follow Up Actions Taken:   No follow-ups on file.     Future Appointments 9/19/2023 - 3/17/2024      None              MEDICATIONS:   Orders Placed This Encounter  "  Medications    sertraline (ZOLOFT) 25 MG tablet     Sig: start with 1/2 tablet daily for 2 weeks, then go to 1 full tab daily     Dispense:  45 tablet     Refill:  1     Profile Rx: patient will contact pharmacy when needed          - Continue other medications without change  Regular exercise  See Patient Instructions           Selena Barnes MD  [facility] [place]    Subjective :   Beatris is a 53 year old, presenting for the following health issues:  Recheck Medication    1. Benign paroxysmal positional vertigo, unspecified laterality    2. Adjustment disorder with mixed anxiety and depressed mood- escitalopram 5mg daily - really blunted mood            8/21/2023     8:18 AM   Additional Questions   Roomed by Fatuma MAYNARD       History of Present Illness       Mental Health Follow-up:  Patient presents to follow-up on Depression & Anxiety.Patient's depression since last visit has been:  No change  The patient is not having other symptoms associated with depression.  Patient's anxiety since last visit has been:  No change  The patient is not having other symptoms associated with anxiety.  Any significant life events: other  Patient is feeling anxious or having panic attacks.  Patient has no concerns about alcohol or drug use.    She eats 4 or more servings of fruits and vegetables daily.She consumes 0 sweetened beverage(s) daily.She exercises with enough effort to increase her heart rate 10 to 19 minutes per day.  She exercises with enough effort to increase her heart rate 3 or less days per week.   She is not taking prescribed medications regularly due to other.       Pt . States she stopped paxil as it was causing her dizziness 8/8/2023. Got nauseated with it as well. Then got stiffness in her neck and when she rolled over in bed, something in her neck \"popped\" on 8/8/2023  , pressure in her neck went away and now has vertigo that is positional.  Worse lying down in bed and turning head " "quickly. Better over time, but sometimes still gets a shooting pain up her neck.   Gets some tingling down her left arm to her left deltoid area that she's had on and off for years, but now is there since   No other numbness, tingling, or weakness in upper or lower extremities. No bowel or bladder control problems. Has had a little more stool discomfort \"with stools moving through her colon\" and some looser stools since the above.   Has had some blurred vision in the morning, but better as the day goes on.  Coordination feels off when the dizziness is worse.  Has had some headaches as well- mostly right temporal and behind her eyes - comes and goes. Has had headaches like these before that have been attributed to her glasses - if she takes her glasses off, her head feels better.   No changes in speech, swallowing, hearing, coordination.       Pt will call to get her eyes checked - if that doesn't resolve her above symptoms with vestibular therapy, she will call ASAP for Brain MRI.      Mood-wise:       6/2/2023    12:38 PM 7/10/2023     9:41 AM 8/20/2023    10:06 PM   PHQ   PHQ-9 Total Score 9 7 12   Q9: Thoughts of better off dead/self-harm past 2 weeks Not at all Not at all Not at all          6/2/2023    12:38 PM 7/10/2023     8:35 AM 8/20/2023    10:08 PM   CAROL-7 SCORE   Total Score 10 (moderate anxiety) 7 (mild anxiety) 9 (mild anxiety)   Total Score 10 7 9           8/20/2023    10:06 PM   Last PHQ-9   1.  Little interest or pleasure in doing things 2   2.  Feeling down, depressed, or hopeless 1   3.  Trouble falling or staying asleep, or sleeping too much 3   4.  Feeling tired or having little energy 2   5.  Poor appetite or overeating 1   6.  Feeling bad about yourself 1   7.  Trouble concentrating 1   8.  Moving slowly or restless 1   Q9: Thoughts of better off dead/self-harm past 2 weeks 0   PHQ-9 Total Score 12         8/20/2023    10:08 PM   CAROL-7    1. Feeling nervous, anxious, or on edge 2   2. Not " being able to stop or control worrying 1   3. Worrying too much about different things 2   4. Trouble relaxing 0   5. Being so restless that it is hard to sit still 0   6. Becoming easily annoyed or irritable 3   7. Feeling afraid, as if something awful might happen 1   CAROL-7 Total Score 9   If you checked any problems, how difficult have they made it for you to do your work, take care of things at home, or get along with other people? Somewhat difficult      Patient Active Problem List   Diagnosis    Advanced directives, counseling/discussion    Multiple lipomas    Sebaceous cyst- left upper forehead     Syncope and collapse    Raynaud's phenomenon - noted 10/17/2011 at first visit     Cystocele with uterine descensus- mild w/ both     Cervical pain    Family history of clotting disorder- sister with MTHFR    Dermatitis- every winter - bilateral flanks and hips    MTHFR mutation - heterozygote    Headache    Chronic neck pain    Mild dysplasia of cervix    DDD (degenerative disc disease), cervical    DDD (degenerative disc disease), thoracic    Cervical spinal stenosis    Chronic constipation    Acute left-sided low back pain without sciatica    Asymptomatic postmenopausal status -early menopause-  since 4/2012 - some mood swings, but scant hot flashes , no night sweats     Dry eyes- sometimes increased watering and sometimes a bit painful in the am's    Hyperlipidemia LDL goal <130    Methylenetetrahydrofolate reductase deficiency (H)    Trochanteric bursitis of both hips- left >> right     Adjustment disorder with mixed anxiety and depressed mood- escitalopram 5mg daily - really blunted mood       Current Outpatient Medications   Medication Sig Dispense Refill    aspirin 81 MG tablet Take 1 tablet (81 mg) by mouth daily 30 tablet     childrens multivitamin with iron (FLINTSTONES COMPLETE) CHEW Take 2 tablets by mouth daily 100 tablet 4    cyclobenzaprine (FLEXERIL) 5 MG tablet Take 1-2 tablets (5-10 mg) by mouth  3 times daily as needed for muscle spasms 30 tablet 1    sertraline (ZOLOFT) 25 MG tablet start with 1/2 tablet daily for 2 weeks, then go to 1 full tab daily 45 tablet 1    triamcinolone (KENALOG) 0.1 % cream Apply topically 3 times daily as needed Apply sparingly to affected area. 80 g 5    VITAMIN D PO       cefPROZIL (CEFZIL) 500 MG tablet  (Patient not taking: Reported on 8/21/2023)          No Known Allergies      Review of Systems   Constitutional, HEENT, cardiovascular, pulmonary, GI, , musculoskeletal, neuro, skin, endocrine and psych systems are negative, except as otherwise noted.      Objective           Vitals:  No vitals were obtained today due to virtual visit.    Physical Exam :   GENERAL: Healthy, alert and no distress  EYES: Eyes grossly normal to inspection.  No discharge or erythema, or obvious scleral/conjunctival abnormalities.  RESP: No audible wheeze, cough, or visible cyanosis.  No visible retractions or increased work of breathing.    SKIN: Visible skin clear. No significant rash, abnormal pigmentation or lesions.  NEURO: Cranial nerves grossly intact.  Mentation and speech appropriate for age.  PSYCH: Mentation appears normal, affect normal/bright, judgement and insight intact, normal speech and appearance well-groomed.    Lab on 06/30/2023   Component Date Value Ref Range Status    TSH 06/30/2023 0.95  0.30 - 4.20 uIU/mL Final    Cholesterol 06/30/2023 218 (H)  <200 mg/dL Final    Triglycerides 06/30/2023 70  <150 mg/dL Final    Direct Measure HDL 06/30/2023 55  >=50 mg/dL Final    LDL Cholesterol Calculated 06/30/2023 149 (H)  <=100 mg/dL Final    Non HDL Cholesterol 06/30/2023 163 (H)  <130 mg/dL Final    Sodium 06/30/2023 140  136 - 145 mmol/L Final    Potassium 06/30/2023 5.0  3.4 - 5.3 mmol/L Final    Chloride 06/30/2023 104  98 - 107 mmol/L Final    Carbon Dioxide (CO2) 06/30/2023 27  22 - 29 mmol/L Final    Anion Gap 06/30/2023 9  7 - 15 mmol/L Final    Urea Nitrogen 06/30/2023  14.4  6.0 - 20.0 mg/dL Final    Creatinine 06/30/2023 0.81  0.51 - 0.95 mg/dL Final    Calcium 06/30/2023 10.1 (H)  8.6 - 10.0 mg/dL Final    Glucose 06/30/2023 85  70 - 99 mg/dL Final    Alkaline Phosphatase 06/30/2023 124 (H)  35 - 104 U/L Final    AST 06/30/2023 22  0 - 45 U/L Final    Reference intervals for this test were updated on 6/12/2023 to more accurately reflect our healthy population. There may be differences in the flagging of prior results with similar values performed with this method. Interpretation of those prior results can be made in the context of the updated reference intervals.    ALT 06/30/2023 18  0 - 50 U/L Final    Reference intervals for this test were updated on 6/12/2023 to more accurately reflect our healthy population. There may be differences in the flagging of prior results with similar values performed with this method. Interpretation of those prior results can be made in the context of the updated reference intervals.      Protein Total 06/30/2023 6.9  6.4 - 8.3 g/dL Final    Albumin 06/30/2023 4.2  3.5 - 5.2 g/dL Final    Bilirubin Total 06/30/2023 0.3  <=1.2 mg/dL Final    GFR Estimate 06/30/2023 86  >60 mL/min/1.73m2 Final    WBC Count 06/30/2023 5.7  4.0 - 11.0 10e3/uL Final    RBC Count 06/30/2023 4.97  3.80 - 5.20 10e6/uL Final    Hemoglobin 06/30/2023 14.1  11.7 - 15.7 g/dL Final    Hematocrit 06/30/2023 43.6  35.0 - 47.0 % Final    MCV 06/30/2023 88  78 - 100 fL Final    MCH 06/30/2023 28.4  26.5 - 33.0 pg Final    MCHC 06/30/2023 32.3  31.5 - 36.5 g/dL Final    RDW 06/30/2023 13.1  10.0 - 15.0 % Final    Platelet Count 06/30/2023 314  150 - 450 10e3/uL Final    T3 Total 06/30/2023 96  85 - 202 ng/dL Final               Video-Visit Details    Type of service:  Video Visit   Start time: 9:20am  Stop time: 9:40am   Time on video: 20 minutes.     Originating Location (pt. Location): Home    Distant Location (provider location):  On-site  Platform used for Video Visit:  AmWell

## 2023-08-24 ENCOUNTER — THERAPY VISIT (OUTPATIENT)
Dept: PHYSICAL THERAPY | Facility: CLINIC | Age: 53
End: 2023-08-24
Attending: FAMILY MEDICINE
Payer: COMMERCIAL

## 2023-08-24 DIAGNOSIS — H81.10 BENIGN PAROXYSMAL POSITIONAL VERTIGO, UNSPECIFIED LATERALITY: ICD-10-CM

## 2023-08-24 PROCEDURE — 95992 CANALITH REPOSITIONING PROC: CPT | Mod: GP | Performed by: PHYSICAL THERAPIST

## 2023-08-24 PROCEDURE — 97161 PT EVAL LOW COMPLEX 20 MIN: CPT | Mod: GP | Performed by: PHYSICAL THERAPIST

## 2023-08-24 NOTE — PROGRESS NOTES
PHYSICAL THERAPY EVALUATION  Type of Visit: Evaluation    See electronic medical record for Abuse and Falls Screening details.    Subjective       Presenting condition or subjective complaint: Vertigo  Date of onset: 08/08/23    Relevant medical history: Arthritis; Depression; Dizziness; Menopause; Migraines or headaches; Osteoarthritis; Vision problems   Dates & types of surgery: Lipoma removals ? Root canal 12/2019    Prior therapy history for the same diagnosis, illness or injury: No      Social support: With a significant other or spouse   Type of home: House   Stairs to enter the home: Yes 2 Is there a railing: No   Ramp: No   Stairs inside the home: Yes 13 Is there a railing: Yes   Help at home:    Equipment owned:       Employment: Yes Educator  Hobbies/Interests: Pickleball, kayaking, reading, puzzles    Patient goals for therapy: Vertigo is transitional  when i first awake, bend over, turn my head, stand up      Pain assessment:  no current neck pain; chronic neck pain     Objective   Cognitive Status Examination  Orientation: Oriented to person, place and time   Level of Consciousness: Alert  Follows Commands and Answers Questions: 100% of the time  Personal Safety and Judgement: Intact    BED MOBILITY: Independent    TRANSFERS: Independent    GAIT:   Over short distances indoors without AD: maintains good speed without path deviations or instability.         VESTIBULAR EVALUATION  ADDITIONAL HISTORY:  Description of symptoms: Off balance; Nausea or vomiting; I feel like I am spinning; Attacks of dizziness; Blurry or jumping vision; I feel like the room is spinning; Light-headedness  Dizzy attacks:   Start: 8/8   Last attack: This morning   Frequency of occurrences: Daily since pressure in my neck left 8/8 - recurs daily, laying down > standing and when getting up after foam rolling neck   Length of attack: Until i regain equilibrium by not moving 20-30sec  Difficulty hearing:    Noise in ears? No     Alleviates symptoms: Massage , stopping movement until steady  Worsens symptoms: Neck position  Activities that bring on symptoms: Bending over; Reaching up    Other sxs: headache/migraines - taking Tylenol, has a prescription for ms relaxor but doesn't feel like she needs this because ROM is good. Reports if neck pain is worse then HA comes of faster. Massage helps.     DHI: Total Score: 24    Cervicogenic Screen    Neck ROM Normal       Infrared Goggle Exam Vestibular Suppressant in Last 24 Hours? No  Exam Completed With: Infrared goggles   Spontaneous Nystagmus Negative   Gaze Evoked Nystagmus Negative   Head Shake Horizontal Nystagmus Negative no dizziness, no neck pain   Positional Testing    Supine Head-Hanging Test     Left Right   [place]-Hallpike  Upbeating R torsional immediate onset, strong, short duration nystagmus, associated vertigo     Sidelying Test     [facility] Supine Roll Test     [facility] Forward Roll Test     [place] and Lean Test -  Sitting Erect    [place] and Lean Test - Seated, Head Bent 60 Degrees Forward    [place] and Lean Test - Seated, Head Bent Backwards       BPPV Canal(s): R Posterior  BPPV Type: Canalithasis    Assessment & Plan   CLINICAL IMPRESSIONS  Medical Diagnosis: Benign paroxysmal positional vertigo, unspecified laterality    Treatment Diagnosis: (+) s/s R BPPV   Impression/Assessment: Patient is a 53 year old female reports short duration room spinning dizziness that occurs with positional change of head/getting up from bed.  The following significant findings have been identified:  (+) nystagmus and vertigo with positional testing consistent with R BPPV . These impairments interfere with their ability to perform  bed mobility, functional head and body positional changes for participation in daily tasks  as compared to previous level of function.     Clinical Decision Making (Complexity):  Clinical Presentation: Stable/Uncomplicated  Clinical Presentation Rationale: based on medical and personal  factors listed in PT evaluation  Clinical Decision Making (Complexity): Low complexity    PLAN OF CARE  Treatment Interventions:  Interventions: Canalith Repositioning    Long Term Goals     PT Goal 1  Goal Identifier: BPPV  Goal Description: Pt will demonstrate (-) s/s of BPPV throughout positional testing of all canals without limitations in bed mobility and transfers d/t dizziness  Goal Progress: 8/24/23: (+) s/s R BPPV  Target Date: 08/07/23      Frequency of Treatment: 1x/week  Duration of Treatment: 2 weeks    Recommended Referrals to Other Professionals:   Education Assessment:   Learner/Method: Patient  Education Comments: results of assessment, PT POC, R CRM HEP to continue prn, referancing poster with ed on anatomy/physiology of vestibular system, s/s of BPPV, treatment, typical recovery time, recurrance rate, ed on neck pain/limitations dont cause BPPV    Risks and benefits of evaluation/treatment have been explained.   Patient/Family/caregiver agrees with Plan of Care.     Evaluation Time:     PT Eval, Low Complexity Minutes (69558): 15       Signing Clinician: Simi Babin PT

## 2023-08-29 ENCOUNTER — THERAPY VISIT (OUTPATIENT)
Dept: PHYSICAL THERAPY | Facility: CLINIC | Age: 53
End: 2023-08-29
Attending: FAMILY MEDICINE
Payer: COMMERCIAL

## 2023-08-29 DIAGNOSIS — H81.10 BENIGN PAROXYSMAL POSITIONAL VERTIGO, UNSPECIFIED LATERALITY: Primary | ICD-10-CM

## 2023-08-29 PROCEDURE — 97164 PT RE-EVAL EST PLAN CARE: CPT | Mod: GP | Performed by: PHYSICAL THERAPIST

## 2023-08-29 NOTE — PROGRESS NOTES
DISCHARGE  Reason for Discharge: Patient has met all goals.    Equipment Issued: none    Discharge Plan: see below for details    Referring Provider:  Selena Barnes       08/29/23 0500   Appointment Info   Signing clinician's name / credentials Simi Babin PT, DPT   Total/Authorized Visits DISHCARGE   Visits Used 2   Medical Diagnosis Benign paroxysmal positional vertigo, unspecified laterality   PT Tx Diagnosis (+) s/s R BPPV   Other pertinent information chronic neck pain   Progress Note/Certification   Onset of illness/injury or Date of Surgery 08/08/23   Therapy Frequency 1x/week   Predicted Duration 2 weeks   PT Goal 1   Goal Identifier BPPV   Goal Description Pt will demonstrate (-) s/s of BPPV throughout positional testing of all canals without limitations in bed mobility and transfers d/t dizziness   Goal Progress 8/29/23: (-) s/s BPPV all canals 8/24/23: (+) s/s R BPPV   Target Date 08/07/23   Date Met 08/29/23   Subjective Report   Subjective Report Nausea and HA but no dizziness or vertigo since last seen. No current HA or nausea. Reports current dull ache in neck.   Treatment Interventions (PT)   Interventions Infrared Goggle Exam or Frenzel Lense Exam   Infrared Goggle Exam or Frenzel Lense Exam   Vestibular Suppressant in Last 24 Hours? No   Exam completed with Infrared Goggles   Spontaneous Nystagmus Negative   [place]-Hallpike (Right) Negative   [place]-Hallpike (Left) Negative   [facility] Supine Roll Test (Right) Negative   [facility] Supine Roll Test (Left) Negative   Other Infrared Goggle Exam or Frenzel Lense Exam Comments (-) s/s BPPV   Eval/Assessments   Assessments PT Re-Eval   PT Eval, Re-eval Minutes (30309) 18   Education   Learner/Method Patient   Education Comments results of reassessment with resolved s/s BPPV, ed on recurrance rate of BPPV, encouraged to cont to address HA and nausea as it more likely relates to her flair of chronic neck pain, ed to f/u with MD should sxs of positional  vertigo return for return to vestibular PT prn   Plan   Updates to plan of care Discharge. Goal met. Resolved vertigo. Resolved s/s BPPV.   Total Session Time   Total Treatment Time (sum of timed and untimed services) 18

## 2023-10-11 ENCOUNTER — MYC MEDICAL ADVICE (OUTPATIENT)
Dept: FAMILY MEDICINE | Facility: CLINIC | Age: 53
End: 2023-10-11

## 2023-10-11 DIAGNOSIS — M54.12 CERVICAL RADICULOPATHY: Primary | ICD-10-CM

## 2023-10-11 DIAGNOSIS — F43.23 ADJUSTMENT DISORDER WITH MIXED ANXIETY AND DEPRESSED MOOD: ICD-10-CM

## 2023-11-20 RX ORDER — SERTRALINE HYDROCHLORIDE 25 MG/1
TABLET, FILM COATED ORAL
Qty: 90 TABLET | Refills: 1 | Status: SHIPPED | OUTPATIENT
Start: 2023-11-20 | End: 2024-05-31

## 2023-11-28 ENCOUNTER — OFFICE VISIT (OUTPATIENT)
Dept: PHYSICAL MEDICINE AND REHAB | Facility: CLINIC | Age: 53
End: 2023-11-28
Attending: FAMILY MEDICINE
Payer: COMMERCIAL

## 2023-11-28 VITALS
BODY MASS INDEX: 24.11 KG/M2 | WEIGHT: 150 LBS | DIASTOLIC BLOOD PRESSURE: 84 MMHG | SYSTOLIC BLOOD PRESSURE: 138 MMHG | HEART RATE: 71 BPM | HEIGHT: 66 IN

## 2023-11-28 DIAGNOSIS — M54.12 CERVICAL RADICULOPATHY: ICD-10-CM

## 2023-11-28 PROCEDURE — 99204 OFFICE O/P NEW MOD 45 MIN: CPT | Performed by: NURSE PRACTITIONER

## 2023-11-28 ASSESSMENT — PAIN SCALES - GENERAL: PAINLEVEL: MODERATE PAIN (4)

## 2023-11-28 NOTE — PROGRESS NOTES
ASSESSMENT: Sherry Osorio is a 53 year old female presents for consultation at the request of PCP Selena Barnes, who presents today for new patient evaluation of :     -Cervical radiculopathy    Patient has a loss of left biceps reflex, positive bilateral Shelley, brisk reflexes throughout on exam. We reviewed her previous cervical MRI scan from 2012, however I expect results likely have changed in the 10 year interval. Given her left arm symptoms and exam findings, recommended cervical MRI without contrast for further evaluation and treatment planning.  Recommended use of over-the-counter anti-inflammatories, ongoing use of Flexeril given her left trap tenderness, and Tylenol as directed over-the-counter as well.  She will proceed with physical therapy. We will review her imaging and decide plan.          11/27/2023     9:00 AM   OSWESTRY DISABILITY INDEX   Count 9   Sum 10   Oswestry Score (%) 22.22 %            Diagnoses and all orders for this visit:  Cervical radiculopathy  -     Spine  Referral  -     MR Cervical Spine w/o Contrast; Future  -     Physical Therapy Referral; Future       PLAN:  Reviewed spine anatomy and disease process. Discussed diagnosis and treatment options with the patient today. A shared decision making model was used. The patient's values and choices were respected. The following represents what was discussed and decided upon by the provider and the patient.     -DIAGNOSTIC TESTS:  Images were personally reviewed and interpreted and explained to patient today using spine model.   -- I ordered a cervical MRI without contrast     -PHYSICAL THERAPY:    -I ordered full course of physical therapy  Discussed the importance of core strengthening, ROM, stretching exercises with the patient and how each of these entities is important in decreasing pain.  Explained to the patient that the purpose of physical therapy is to teach the patient a home exercise program.  These  exercises need to be performed every day in order to decrease pain and prevent future occurrences of pain.    -MEDICATIONS:    -Recommended over-the-counter Tylenol, ibuprofen or Aleve  -Recommended ongoing as needed use of Flexeril 5 mg 3 times daily as needed  -Briefly discussed the role for neuropathic agents, however recommended obtaining imaging results prior to starting this type of medication  Discussed multiple medication options today with patient. Discussed risks, side effects, and proper use of medications. Patient verbalized understanding.    -INTERVENTIONS:    Did not discuss injections today, however patient would be a good candidate in the future for either epidural steroid injections or medial branch blocks if indicated based on symptoms and supported by imaging results.    -PATIENT EDUCATION: Total time of 40 minutes, on the day of service, spent with the patient, reviewing the chart, placing orders, and documenting.   -Today we also discussed the issues related to the pros and cons of the current treatment plan.    -FOLLOW-UP:   We will call regarding imaging results    Advised patient to call the [facility] if symptoms worsen or if they develop red flag symptoms such as numbness, weakness, severe pain uncontrolled by current pain med regimen, or any new or worsening problems controlling bladder and bowel function.   ______________________________________________________________________    SUBJECTIVE:   Sherry Osorio  is a 53 year old female hx osteopenia, anxiety, depression, osteoarthritis who presents today for new patient evaluation of neck pain     She complains of neck pain on a chronic basis, which more recently has progressed into and electrical shocklike radiation of pain into the left upper posterior scalp and left outer upper arm into midway down the forearm.  She rates the pain at a 4 out of 10 today, at worst an 8 out of 10, at best a 2 out of 10.    If she looks up, she feels  like she has a block that blocks her range of motion and it brings on a tightness into the shoulder. Sometimes has numbness and tingling in the arm in the same distribution.  It seems to come on at random, sometimes with position of her head, sometimes not.  Sometimes experiences weakness in the left hand.  She does not often drop objects.  She takes Tylenol over-the-counter at times for her symptoms and Flexeril 5 mg sporadically with relief.  She has taken ibuprofen at times, but tries to avoid it due to her history of MTHFR mutation.  She has done physical therapy years ago, and has continued to use these exercises on a daily basis without relief.  She has done chiropractic treatment in the past, but not recently.  She has never had any cervical epidural spinal injections, or previous surgeries.  Her last cervical spine MRI was in 2012.    She has had vertigo and changes in her vision for several months as well. She has a history of dry eye, blinking does not always clear them. No double vision.  She has had several months of pressure headaches 3x per week, which come and go but recently more often. She did vestibular PT this fall for her dizziness with improvement.      She denies leg symptoms, falls, or changes in bowel or bladder control      -Treatment to Date:     -Medications:  Tylenol  Ibuprofen  Flexeril    Current Outpatient Medications   Medication    aspirin 81 MG tablet    cefPROZIL (CEFZIL) 500 MG tablet    childrens multivitamin with iron (FLINTSTONES COMPLETE) CHEW    cyclobenzaprine (FLEXERIL) 5 MG tablet    sertraline (ZOLOFT) 25 MG tablet    triamcinolone (KENALOG) 0.1 % cream    VITAMIN D PO     No current facility-administered medications for this visit.       No Known Allergies    Past Medical History:   Diagnosis Date    Adjustment disorder with mixed anxiety and depressed mood     escitalopram 5mg daily - really blunted mood; paroxetine: nausea    Arthritis 2010    osteoarthritis...     ASCUS favor benign 2011, 2014    neg HPV Plan cotest in 3 yrs.    Cellulitis of right leg w/ abrasion 09/04/2012    Cervicalgia     Headache     Left ankle injury 09/04/2012    MTHFR mutation     heterozygote - referred to hematology    Pap smear cannot exclude high grade squamous intraepithelial lesion (ASC-H) 03/10/2017    03/10/17: ASC-H pap, Neg HR HPV result.     Puncture wound of left lower leg with complication- cellulitis 09/04/2012    Vertigo summer 2006    rehydrated over night in hosp = better         Patient Active Problem List   Diagnosis    Advanced directives, counseling/discussion    Multiple lipomas    Sebaceous cyst- left upper forehead     Syncope and collapse    Raynaud's phenomenon - noted 10/17/2011 at first visit     Cystocele with uterine descensus- mild w/ both     Cervical pain    Family history of clotting disorder- sister with MTHFR    Dermatitis- every winter - bilateral flanks and hips    MTHFR mutation - heterozygote    Headache    Chronic neck pain    Mild dysplasia of cervix    DDD (degenerative disc disease), cervical    DDD (degenerative disc disease), thoracic    Cervical spinal stenosis    Chronic constipation    Acute left-sided low back pain without sciatica    Asymptomatic postmenopausal status -early menopause-  since 4/2012 - some mood swings, but scant hot flashes , no night sweats     Dry eyes- sometimes increased watering and sometimes a bit painful in the am's    Hyperlipidemia LDL goal <130    Methylenetetrahydrofolate reductase deficiency (H24)    Trochanteric bursitis of both hips- left >> right     Adjustment disorder with mixed anxiety and depressed mood- escitalopram 5mg daily - really blunted mood       Past Surgical History:   Procedure Laterality Date    BIOPSY  8/1995; 11/2017    needle biopsy of mass in left breast;shave biopsy of scalp    COLONOSCOPY  2/11/2016    Dr. Diaz [facility]    COLONOSCOPY N/A 2/11/2016    Procedure: COLONOSCOPY;  Surgeon: Joe  Oscar Weiss MD;  Location: [facility]    EXCISE MASS HEAD  2012    Procedure: EXCISE MASS HEAD;  Excision Left Forehead Mass, Excision Right Posterior Thigh Mass, Excision Left Posterior Thigh Mass ;  Surgeon: Felix Trivedi MD;  Location: [facility] OR    EXCISE MASS LOWER EXTREMITY  2012    Procedure: EXCISE MASS LOWER EXTREMITY;;  Surgeon: Felix Trivedi MD;  Location: [facility] OR    wisdom teeth extraction         Family History   Problem Relation Age of Onset    Asthma Father     Hypertension Father     Cerebrovascular Disease Father     Prostate Cancer Father     Neurologic Disorder Father         Lewy Body dementia - broke hip-  at age 76     Cerebrovascular Disease Sister 47    Thyroid Disease Sister 68        hypothyroid     Blood Disease Sister 68        MTHFR deficiency - not factor V leiden     Cerebrovascular Disease Sister 15        secondary to MTHFR deficiency     Cancer Sister 30        Breast cancer dx'd 2012    Breast Cancer Sister     Blood Disease Sister     Cancer Sister     Depression Sister     Thyroid Disease Sister     Cerebrovascular Disease Sister     Breast Cancer Sister         just had genetic testing done 2019 - she's heterozygous MTHFR     Depression Sister     Thyroid Disease Sister     Endometrial Cancer Sister 51        Endometrioid adenocarcinoma grade 1    Other - See Comments Sister         heterozygous MTHFR     Colon Cancer Other         Maternal aunt       Reviewed past medical, surgical, and family history with patient found on new patient intake packet located in EMR Media tab.     SOCIAL HX: Alcohol use, no heavy drinking, no smoking, no recreational drug use    Oswestry (TANIKA) Questionnaire:        2023     9:00 AM   OSWESTRY DISABILITY INDEX   Count 9   Sum 10   Oswestry Score (%) 22.22 %       Neck Disability Index:      2023     9:03 AM   Neck Disability Index (  Naresh H. and Lindy C. 1991. All rights reserved.; used with permission)   SECTION  "1 - PAIN INTENSITY 2   SECTION 2 - PERSONAL CARE 2   SECTION 3 - LIFTING 1   SECTION 4 - READING 2   SECTION 5 - HEADACHES 3   SECTION 6 - CONCENTRATION 1   SECTION 7 - WORK 2   SECTION 8 - DRIVING 2   SECTION 9 - SLEEPING 3   SECTION 10 - RECREATION 2   Count 10   Sum 20   Raw Score: /50 20   Neck Disability Index Score: (%) 40 %          PHQ-2 Score:       6/2/2023    12:42 PM 2/27/2022    11:35 AM   PHQ-2 ( 1999 [employer])   Q1: Little interest or pleasure in doing things 1 0   Q2: Feeling down, depressed or hopeless 2 0   PHQ-2 Score 3 0   Q1: Little interest or pleasure in doing things Several days Not at all   Q2: Feeling down, depressed or hopeless More than half the days Not at all   PHQ-2 Score 3 0          ROS: Positive for headache, ringing in ears, changes in vision, joint pain, muscle pain, muscle fatigue, imbalance, dizziness, slight insomnia, excessive tiredness, anxiety, depression.  Specifically negative for bowel/bladder dysfunction,  foot drop, fevers, chills, appetite changes, nausea/vomiting, unexplained weight loss. Otherwise 13 systems reviewed are negative. Please see the patient's intake questionnaire from today for details.    OBJECTIVE:  /84   Pulse 71   Ht 5' 6\" (1.676 m)   Wt 150 lb (68 kg)   LMP 04/01/2015   BMI 24.21 kg/m      PHYSICAL EXAMINATION:  --CONSTITUTIONAL: Vital signs as above. No acute distress. The patient is well nourished and well groomed.  --PSYCHIATRIC: The patient is awake, alert, oriented to person, place, and time, and answering questions appropriately with clear speech. Appropriate mood and affect   --HEENT: Sclera are non-injected. Extraocular muscles are intact. Moist oral mucosa.  --SKIN: Skin over the face, bilateral upper extremities, and posterior torso is clean, dry, intact without rashes.  --RESPIRATORY: Normal rhythm and effort. No abnormal accessory muscle breathing patterns noted.       --GROSS MOTOR: Easily arises from a seated position. Toe " walking, heel walking, and tandem gait are normal.      --UPPER EXTREMITY MOTOR TESTING:  Shoulder abduction: right 5/5, left 5/5  Triceps: right 5/5 left 5/5  Biceps:right 5/5  left 5/5,   Hand :  right 5/5  left 5/5,   Intrinsics: right 5-/5  left 4+/5,   Extensors: right 5-/5  left 4+/5,   Thumb abduction/adduction 4+/5 right, 4/5 left    --LOWER EXTREMITY MOTOR TESTING  Hip flexion: right 5/5  left 5/5,   Quads:right 5/5  left 5/5,   Hamstrings: right 5/5  left 5/5,   Dorsiflexion: right 5/5  left 5/5,   Plantarflexion: right 5/5  left 5/5,   EHL: right 5/5  left 5/5,     REFLEXES: brisk symmetric triceps, brisk R biceps, 0/4 left biceps, brisk brachioradialis bilaterally.brisk  symmetric patellar, 1/4 achilles reflex bilaterally.  Negative Clonus, Babinski  positive Lux's bilaterally.      SENSATION: of the upper and lower extremities is intact to light touch.   --VASCULAR: Warm upper and lower limbs bilaterally.     --CERVICAL SPINE: Inspection reveals no evidence of deformity or swelling. Range of motion is somewhat limited in cervical rotation to the left. Range of motion is not limited in flexion, extension, right lateral rotation. Mild reduction in rom melany with head tilt. Mild mid-upper cervical point tenderness to palpation of cervical spine. positive tenderness to palpation of left upper/mid trap. No tenderness to palp of R trap, melany scaps, or paraspinal cervical musculature.        RESULTS:   Prior medical records from [facility] and [facility] Everywhere were reviewed today.         IMAGING:  Spine imaging was personally reviewed and interpreted today. The images were shown to the patient and the findings were explained using a spine model.       MRI OF THE CERVICAL SPINE WITHOUT CONTRAST Feb 15, 2012 5:21 PM      HISTORY: Cervical radiculopathy.      TECHNIQUE: Multiplanar, multisequence MRI images of the cervical spine   were acquired without intravenous contrast.      COMPARISON: None.       FINDINGS: There is normal alignment of the cervical vertebrae.   Vertebral body heights of the cervical spine are well maintained.   Marrow signal throughout the cervical vertebrae is within normal   limits. There is no evidence for fracture or pathologic bony lesion of   the cervical spine.      There is loss of disc height, disc desiccation and posterior disc   bulging to varying degrees at all levels of the cervical spine with   exception of the relatively normal appearing C7-T1 disc.      The cervical spinal cord is mildly flattened by a posterior disc   osteophyte complex at the C5-C6 level; the cervical spinal cord is   otherwise normal in contour and signal intensity. There is no evidence   for intrathecal abnormality.      Level-by-level:      C2-C3: There is facet arthropathy bilaterally, uncinate hypertrophy   bilaterally and a posterior broad-based disc-osteophyte complex. There   is no spinal canal or neural foraminal stenosis at this level.      C3-C4: There is facet arthropathy bilaterally, uncinate hypertrophy   bilaterally and a posterior broad-based disc-osteophyte complex. There   is no spinal canal or neural foraminal stenosis at this level.      C4-C5: There is facet arthropathy bilaterally, uncinate hypertrophy   bilaterally and a posterior broad-based disc-osteophyte complex. These   findings result in mild spinal canal narrowing and minimal left   foraminal narrowing. There is no right foraminal stenosis.      C5-C6: There is facet arthropathy bilaterally, uncinate hypertrophy   bilaterally and a posterior broad-based disc-osteophyte complex. These   findings result in moderate left foraminal stenosis, mild right   foraminal narrowing and moderate spinal canal stenosis with mild   flattening of the cervical spinal cord.      C6-C7: There is facet arthropathy bilaterally, uncinate hypertrophy   bilaterally and a posterior broad-based disc-osteophyte complex. There   is no spinal canal or  neural foraminal stenosis at this level.      C7-T1: There is no spinal canal or neural foraminal stenosis at this   level.      IMPRESSION: Degenerative changes of the cervical spine as described   above.     Mayra SHEPHERDC  [facility]  O. [phone]

## 2023-11-28 NOTE — LETTER
11/28/2023         RE: Sherry Osorio  [address] [place] [address]        Dear Colleague,    Thank you for referring your patient, Sherry Osorio, to the [facility]. Please see a copy of my visit note below.    ASSESSMENT: Sherry Osorio is a 53 year old female presents for consultation at the request of PCP Selena Barnes, who presents today for new patient evaluation of :     -Cervical radiculopathy    Patient has a loss of left biceps reflex, positive bilateral Shelley, brisk reflexes throughout on exam. We reviewed her previous cervical MRI scan from 2012, however I expect results likely have changed in the 10 year interval. Given her left arm symptoms and exam findings, recommended cervical MRI without contrast for further evaluation and treatment planning.  Recommended use of over-the-counter anti-inflammatories, ongoing use of Flexeril given her left trap tenderness, and Tylenol as directed over-the-counter as well.  She will proceed with physical therapy. We will review her imaging and decide plan.          11/27/2023     9:00 AM   OSWESTRY DISABILITY INDEX   Count 9   Sum 10   Oswestry Score (%) 22.22 %            Diagnoses and all orders for this visit:  Cervical radiculopathy  -     Spine  Referral  -     MR Cervical Spine w/o Contrast; Future  -     Physical Therapy Referral; Future       PLAN:  Reviewed spine anatomy and disease process. Discussed diagnosis and treatment options with the patient today. A shared decision making model was used. The patient's values and choices were respected. The following represents what was discussed and decided upon by the provider and the patient.     -DIAGNOSTIC TESTS:  Images were personally reviewed and interpreted and explained to patient today using spine model.   -- I ordered a cervical MRI without contrast     -PHYSICAL THERAPY:    -I ordered full course of physical therapy  Discussed the  importance of core strengthening, ROM, stretching exercises with the patient and how each of these entities is important in decreasing pain.  Explained to the patient that the purpose of physical therapy is to teach the patient a home exercise program.  These exercises need to be performed every day in order to decrease pain and prevent future occurrences of pain.    -MEDICATIONS:    -Recommended over-the-counter Tylenol, ibuprofen or Aleve  -Recommended ongoing as needed use of Flexeril 5 mg 3 times daily as needed  -Briefly discussed the role for neuropathic agents, however recommended obtaining imaging results prior to starting this type of medication  Discussed multiple medication options today with patient. Discussed risks, side effects, and proper use of medications. Patient verbalized understanding.    -INTERVENTIONS:    Did not discuss injections today, however patient would be a good candidate in the future for either epidural steroid injections or medial branch blocks if indicated based on symptoms and supported by imaging results.    -PATIENT EDUCATION: Total time of 40 minutes, on the day of service, spent with the patient, reviewing the chart, placing orders, and documenting.   -Today we also discussed the issues related to the pros and cons of the current treatment plan.    -FOLLOW-UP:   We will call regarding imaging results    Advised patient to call the [facility] if symptoms worsen or if they develop red flag symptoms such as numbness, weakness, severe pain uncontrolled by current pain med regimen, or any new or worsening problems controlling bladder and bowel function.   ______________________________________________________________________    SUBJECTIVE:   Sherry Osorio  is a 53 year old female hx osteopenia, anxiety, depression, osteoarthritis who presents today for new patient evaluation of neck pain     She complains of neck pain on a chronic basis, which more recently has progressed  into and electrical shocklike radiation of pain into the left upper posterior scalp and left outer upper arm into midway down the forearm.  She rates the pain at a 4 out of 10 today, at worst an 8 out of 10, at best a 2 out of 10.    If she looks up, she feels like she has a block that blocks her range of motion and it brings on a tightness into the shoulder. Sometimes has numbness and tingling in the arm in the same distribution.  It seems to come on at random, sometimes with position of her head, sometimes not.  Sometimes experiences weakness in the left hand.  She does not often drop objects.  She takes Tylenol over-the-counter at times for her symptoms and Flexeril 5 mg sporadically with relief.  She has taken ibuprofen at times, but tries to avoid it due to her history of MTHFR mutation.  She has done physical therapy years ago, and has continued to use these exercises on a daily basis without relief.  She has done chiropractic treatment in the past, but not recently.  She has never had any cervical epidural spinal injections, or previous surgeries.  Her last cervical spine MRI was in 2012.    She has had vertigo and changes in her vision for several months as well. She has a history of dry eye, blinking does not always clear them. No double vision.  She has had several months of pressure headaches 3x per week, which come and go but recently more often. She did vestibular PT this fall for her dizziness with improvement.      She denies leg symptoms, falls, or changes in bowel or bladder control      -Treatment to Date:     -Medications:  Tylenol  Ibuprofen  Flexeril    Current Outpatient Medications   Medication     aspirin 81 MG tablet     cefPROZIL (CEFZIL) 500 MG tablet     childrens multivitamin with iron (FLINTSTONES COMPLETE) CHEW     cyclobenzaprine (FLEXERIL) 5 MG tablet     sertraline (ZOLOFT) 25 MG tablet     triamcinolone (KENALOG) 0.1 % cream     VITAMIN D PO     No current facility-administered  medications for this visit.       No Known Allergies    Past Medical History:   Diagnosis Date     Adjustment disorder with mixed anxiety and depressed mood     escitalopram 5mg daily - really blunted mood; paroxetine: nausea     Arthritis 2010    osteoarthritis...     ASCUS favor benign 2011, 2014    neg HPV Plan cotest in 3 yrs.     Cellulitis of right leg w/ abrasion 09/04/2012     Cervicalgia      Headache      Left ankle injury 09/04/2012     MTHFR mutation     heterozygote - referred to hematology     Pap smear cannot exclude high grade squamous intraepithelial lesion (ASC-H) 03/10/2017    03/10/17: ASC-H pap, Neg HR HPV result.      Puncture wound of left lower leg with complication- cellulitis 09/04/2012     Vertigo summer 2006    rehydrated over night in hosp = better         Patient Active Problem List   Diagnosis     Advanced directives, counseling/discussion     Multiple lipomas     Sebaceous cyst- left upper forehead      Syncope and collapse     Raynaud's phenomenon - noted 10/17/2011 at first visit      Cystocele with uterine descensus- mild w/ both      Cervical pain     Family history of clotting disorder- sister with MTHFR     Dermatitis- every winter - bilateral flanks and hips     MTHFR mutation - heterozygote     Headache     Chronic neck pain     Mild dysplasia of cervix     DDD (degenerative disc disease), cervical     DDD (degenerative disc disease), thoracic     Cervical spinal stenosis     Chronic constipation     Acute left-sided low back pain without sciatica     Asymptomatic postmenopausal status -early menopause-  since 4/2012 - some mood swings, but scant hot flashes , no night sweats      Dry eyes- sometimes increased watering and sometimes a bit painful in the am's     Hyperlipidemia LDL goal <130     Methylenetetrahydrofolate reductase deficiency (H24)     Trochanteric bursitis of both hips- left >> right      Adjustment disorder with mixed anxiety and depressed mood- escitalopram  5mg daily - really blunted mood       Past Surgical History:   Procedure Laterality Date     BIOPSY  1995; 2017    needle biopsy of mass in left breast;shave biopsy of scalp     COLONOSCOPY  2016    Dr. Diaz [facility]     COLONOSCOPY N/A 2016    Procedure: COLONOSCOPY;  Surgeon: Oscar Diaz MD;  Location: [facility]     EXCISE MASS HEAD  2012    Procedure: EXCISE MASS HEAD;  Excision Left Forehead Mass, Excision Right Posterior Thigh Mass, Excision Left Posterior Thigh Mass ;  Surgeon: Felix Trivedi MD;  Location: [facility] OR     EXCISE MASS LOWER EXTREMITY  2012    Procedure: EXCISE MASS LOWER EXTREMITY;;  Surgeon: Felix Trivedi MD;  Location: [facility] OR     wisdom teeth extraction         Family History   Problem Relation Age of Onset     Asthma Father      Hypertension Father      Cerebrovascular Disease Father      Prostate Cancer Father      Neurologic Disorder Father         Lewy Body dementia - broke hip-  at age 76      Cerebrovascular Disease Sister 47     Thyroid Disease Sister 68        hypothyroid      Blood Disease Sister 68        MTHFR deficiency - not factor V leiden      Cerebrovascular Disease Sister 15        secondary to MTHFR deficiency      Cancer Sister 30        Breast cancer dx'd 2012     Breast Cancer Sister      Blood Disease Sister      Cancer Sister      Depression Sister      Thyroid Disease Sister      Cerebrovascular Disease Sister      Breast Cancer Sister         just had genetic testing done 2019 - she's heterozygous MTHFR      Depression Sister      Thyroid Disease Sister      Endometrial Cancer Sister 51        Endometrioid adenocarcinoma grade 1     Other - See Comments Sister         heterozygous MTHFR      Colon Cancer Other         Maternal aunt       Reviewed past medical, surgical, and family history with patient found on new patient intake packet located in EMR [employer] tab.     SOCIAL HX: Alcohol use, no heavy drinking, no smoking,  "no recreational drug use    Oswestry (TANIKA) Questionnaire:        11/27/2023     9:00 AM   OSWESTRY DISABILITY INDEX   Count 9   Sum 10   Oswestry Score (%) 22.22 %       Neck Disability Index:      11/27/2023     9:03 AM   Neck Disability Index (  Naresh SUAREZ. and Lindy COULTER. 1991. All rights reserved.; used with permission)   SECTION 1 - PAIN INTENSITY 2   SECTION 2 - PERSONAL CARE 2   SECTION 3 - LIFTING 1   SECTION 4 - READING 2   SECTION 5 - HEADACHES 3   SECTION 6 - CONCENTRATION 1   SECTION 7 - WORK 2   SECTION 8 - DRIVING 2   SECTION 9 - SLEEPING 3   SECTION 10 - RECREATION 2   Count 10   Sum 20   Raw Score: /50 20   Neck Disability Index Score: (%) 40 %          PHQ-2 Score:       6/2/2023    12:42 PM 2/27/2022    11:35 AM   PHQ-2 ( 1999 [employer])   Q1: Little interest or pleasure in doing things 1 0   Q2: Feeling down, depressed or hopeless 2 0   PHQ-2 Score 3 0   Q1: Little interest or pleasure in doing things Several days Not at all   Q2: Feeling down, depressed or hopeless More than half the days Not at all   PHQ-2 Score 3 0          ROS: Positive for headache, ringing in ears, changes in vision, joint pain, muscle pain, muscle fatigue, imbalance, dizziness, slight insomnia, excessive tiredness, anxiety, depression.  Specifically negative for bowel/bladder dysfunction,  foot drop, fevers, chills, appetite changes, nausea/vomiting, unexplained weight loss. Otherwise 13 systems reviewed are negative. Please see the patient's intake questionnaire from today for details.    OBJECTIVE:  /84   Pulse 71   Ht 5' 6\" (1.676 m)   Wt 150 lb (68 kg)   LMP 04/01/2015   BMI 24.21 kg/m      PHYSICAL EXAMINATION:  --CONSTITUTIONAL: Vital signs as above. No acute distress. The patient is well nourished and well groomed.  --PSYCHIATRIC: The patient is awake, alert, oriented to person, place, and time, and answering questions appropriately with clear speech. Appropriate mood and affect   --HEENT: Sclera are " non-injected. Extraocular muscles are intact. Moist oral mucosa.  --SKIN: Skin over the face, bilateral upper extremities, and posterior torso is clean, dry, intact without rashes.  --RESPIRATORY: Normal rhythm and effort. No abnormal accessory muscle breathing patterns noted.       --GROSS MOTOR: Easily arises from a seated position. Toe walking, heel walking, and tandem gait are normal.      --UPPER EXTREMITY MOTOR TESTING:  Shoulder abduction: right 5/5, left 5/5  Triceps: right 5/5 left 5/5  Biceps:right 5/5  left 5/5,   Hand :  right 5/5  left 5/5,   Intrinsics: right 5-/5  left 4+/5,   Extensors: right 5-/5  left 4+/5,   Thumb abduction/adduction 4+/5 right, 4/5 left    --LOWER EXTREMITY MOTOR TESTING  Hip flexion: right 5/5  left 5/5,   Quads:right 5/5  left 5/5,   Hamstrings: right 5/5  left 5/5,   Dorsiflexion: right 5/5  left 5/5,   Plantarflexion: right 5/5  left 5/5,   EHL: right 5/5  left 5/5,     REFLEXES: brisk symmetric triceps, brisk R biceps, 0/4 left biceps, brisk brachioradialis bilaterally.brisk  symmetric patellar, 1/4 achilles reflex bilaterally.  Negative Clonus, Babinski  positive Lux's bilaterally.      SENSATION: of the upper and lower extremities is intact to light touch.   --VASCULAR: Warm upper and lower limbs bilaterally.     --CERVICAL SPINE: Inspection reveals no evidence of deformity or swelling. Range of motion is somewhat limited in cervical rotation to the left. Range of motion is not limited in flexion, extension, right lateral rotation. Mild reduction in rom melany with head tilt. Mild mid-upper cervical point tenderness to palpation of cervical spine. positive tenderness to palpation of left upper/mid trap. No tenderness to palp of R trap, melany scaps, or paraspinal cervical musculature.        RESULTS:   Prior medical records from [facility] and [facility] Everywhere were reviewed today.         IMAGING:  Spine imaging was personally reviewed and interpreted today. The  images were shown to the patient and the findings were explained using a spine model.       MRI OF THE CERVICAL SPINE WITHOUT CONTRAST Feb 15, 2012 5:21 PM      HISTORY: Cervical radiculopathy.      TECHNIQUE: Multiplanar, multisequence MRI images of the cervical spine   were acquired without intravenous contrast.      COMPARISON: None.      FINDINGS: There is normal alignment of the cervical vertebrae.   Vertebral body heights of the cervical spine are well maintained.   Marrow signal throughout the cervical vertebrae is within normal   limits. There is no evidence for fracture or pathologic bony lesion of   the cervical spine.      There is loss of disc height, disc desiccation and posterior disc   bulging to varying degrees at all levels of the cervical spine with   exception of the relatively normal appearing C7-T1 disc.      The cervical spinal cord is mildly flattened by a posterior disc   osteophyte complex at the C5-C6 level; the cervical spinal cord is   otherwise normal in contour and signal intensity. There is no evidence   for intrathecal abnormality.      Level-by-level:      C2-C3: There is facet arthropathy bilaterally, uncinate hypertrophy   bilaterally and a posterior broad-based disc-osteophyte complex. There   is no spinal canal or neural foraminal stenosis at this level.      C3-C4: There is facet arthropathy bilaterally, uncinate hypertrophy   bilaterally and a posterior broad-based disc-osteophyte complex. There   is no spinal canal or neural foraminal stenosis at this level.      C4-C5: There is facet arthropathy bilaterally, uncinate hypertrophy   bilaterally and a posterior broad-based disc-osteophyte complex. These   findings result in mild spinal canal narrowing and minimal left   foraminal narrowing. There is no right foraminal stenosis.      C5-C6: There is facet arthropathy bilaterally, uncinate hypertrophy   bilaterally and a posterior broad-based disc-osteophyte complex. These    findings result in moderate left foraminal stenosis, mild right   foraminal narrowing and moderate spinal canal stenosis with mild   flattening of the cervical spinal cord.      C6-C7: There is facet arthropathy bilaterally, uncinate hypertrophy   bilaterally and a posterior broad-based disc-osteophyte complex. There   is no spinal canal or neural foraminal stenosis at this level.      C7-T1: There is no spinal canal or neural foraminal stenosis at this   level.      IMPRESSION: Degenerative changes of the cervical spine as described   above.     Mayra WHITMANP-C  [facility]  O. [phone]      Again, thank you for allowing me to participate in the care of your patient.        Sincerely,        AIMEE Graham CNP

## 2023-11-28 NOTE — PATIENT INSTRUCTIONS
"Radicular Pain    Radicular pain in either the arm or leg is usually from a bulging disc in the spine. A portion of the herniated disc may press against the nerves as the nerves exit the spine. This causes pain which is felt down the arm or leg. Other causes of radicular pain may include:  Fractures.  Heart disease.  Cancer.  An abnormal and usually degenerative state of the nervous system or nerves (neuropathy).    In most cases, radicular pain is treated without imaging unless symptoms do not start to improve. If that is the case, your provider may order a CT or MRI scan to determine the cause.     Nerves in the cervical spine (neck) may cause radicular pain into the outer shoulder and down the arm. It can spread down to the thumb and fingers. The symptoms vary depending on which nerve root has been affected. In most cases, radicular pain improves with conservative treatment such as physical therapy, cervical traction, medications, and epidural steroid injections. A program for neck rehabilitation with stretching and strengthening exercises is an important part of management. Treatment may take months, and surgery may be considered as a last resort if the symptoms do not improve.    Nerves in the lumbar spine (lower back) may cause radicular pain into the hip and down the leg. The commonly used term for this type of pain is \"sciatica\". Conservative treatment is also recommended for this problem. Most patients feel better after 2 to 4 weeks of rest and other supportive care. You should avoid bending, lifting, and all other activities which can make your pain worse. Physical therapy, traction, medications, and epidural steroid injections can be good options to help with your recovery. A program for back injury rehabilitation with stretching and strengthening exercises is an important part of management. Surgery may be considered as a last resort if symptoms do not improve with conservative treatment.     You may " take over-the-counter or prescription medicines for pain, discomfort, or fever as directed by your caregiver. Muscle relaxants may help by relieving more severe pain and spasm. Neuropathic medication (such as gabapentin, lyrica, or cymbalta) can help decrease your symptoms of nerve pain as well. Cold or massage can also give significant relief. Spinal manipulation is not recommended as it can increase the degree of disc protrusion. We do not recommend taking narcotic medication such as percocet, oxycodone, norco, dilaudid, or others unless pain is severe and not controlled with any other oral options.    Epidural steroid injections are often effective treatment for radicular pain. These injections deliver strong anti-inflammatory medicine to the area directly around the nerve root in the space between your back bones (vertebrae). Your provider can give you more information about steroid injections. These injections are most effective when given within two weeks of the onset of acute pain. You should see your provider for follow up care as recommended.     In most cases, radicular pain is treated without imaging unless symptoms do not start to improve. If that is the case, your provider may order a CT or MRI scan to determine the cause.     SEEK IMMEDIATE MEDICAL CARE IF:  You develop increased pain, weakness, or numbness in your arm or leg.  You develop difficulty with bladder or bowel control.  You develop abdominal pain.    Document Released: 01/25/2006 Document Revised: 03/11/2013 Document Reviewed: 04/12/2010  Patient Information  2013 [employer].       ~You have been referred for Physical Therapy to [facility]. They will call you to schedule an appointment.      Scheduling phone number is [phone] for [facility], or [facility].  If you have not heard from the scheduling office within 2 business days, please call [phone] for ALL other  locations.    Discussed the importance of core strengthening, ROM, stretching exercises and how each of these entities is important in decreasing pain and improving long term spine health.  The purpose of physical therapy is to teach you an individualized home exercise program.  These exercises need to be performed every day in order to decrease pain and prevent future occurrences of pain.           OK to continue your flexeril as needed  OK to trial over the counter ibuprofen or aleve as directed as needed  OK to take tylenol over the counter as directed as needed      ~Please call our [facility] Nurse Navigation line [phone] with any questions or concerns about your treatment plan, if symptoms worsen and you would like to be seen urgently, or if you have any new or worsening numbness, weakness, or problems controlling bladder and bowel function.  ~You are also welcome to contact Mayra Cash via [employer], but please be aware that responses to [employer] message may take 2-3 days due to the high volume of patients seen in clinic.

## 2023-12-08 ENCOUNTER — ANCILLARY PROCEDURE (OUTPATIENT)
Dept: MRI IMAGING | Facility: CLINIC | Age: 53
End: 2023-12-08
Attending: NURSE PRACTITIONER
Payer: COMMERCIAL

## 2023-12-08 DIAGNOSIS — M54.12 CERVICAL RADICULOPATHY: ICD-10-CM

## 2023-12-08 PROCEDURE — 72141 MRI NECK SPINE W/O DYE: CPT

## 2023-12-20 ENCOUNTER — TELEPHONE (OUTPATIENT)
Dept: PHYSICAL MEDICINE AND REHAB | Facility: CLINIC | Age: 53
End: 2023-12-20
Payer: COMMERCIAL

## 2023-12-20 NOTE — TELEPHONE ENCOUNTER
----- Message from AIMEE Adkins CNP sent at 12/20/2023  1:16 PM CST -----  Please let Beatris know that her cervical MRI shows some chronic wear and tear arthritis changes of the joints and a small disc bulge causing narrowing around her nerves at C5-6. I would recommend working on neck strengthening specifically in PT and following up with me after completion of PT to review results and check in.

## 2023-12-20 NOTE — TELEPHONE ENCOUNTER
Call placed to patient with provider's results and recommendations.  Pt stated understanding. Pt states she was never contacted by PT to schedule. She requested [employer]raise message be sent with the information and phone #. Message sent.

## 2024-01-05 ENCOUNTER — THERAPY VISIT (OUTPATIENT)
Dept: PHYSICAL THERAPY | Facility: CLINIC | Age: 54
End: 2024-01-05
Attending: NURSE PRACTITIONER
Payer: COMMERCIAL

## 2024-01-05 DIAGNOSIS — M54.12 CERVICAL RADICULOPATHY: ICD-10-CM

## 2024-01-05 PROCEDURE — 97161 PT EVAL LOW COMPLEX 20 MIN: CPT | Mod: GP | Performed by: PHYSICAL THERAPIST

## 2024-01-05 PROCEDURE — 97110 THERAPEUTIC EXERCISES: CPT | Mod: GP | Performed by: PHYSICAL THERAPIST

## 2024-01-05 NOTE — PROGRESS NOTES
PHYSICAL THERAPY EVALUATION  Type of Visit: Evaluation  History of cervical radiculopathy since 2014. Pt has noted increased symptoms 12-23  See electronic medical record for Abuse and Falls Screening details.    Subjective       Presenting condition or subjective complaint:    Date of onset:      Relevant medical history:     Dates & types of surgery:      Prior diagnostic imaging/testing results:       Prior therapy history for the same diagnosis, illness or injury:        Prior Level of Function  Transfers: Independent  Ambulation: Independent  ADL: Independent  IADL: Driving, Housekeeping, Work    Living Environment  Social support:     Type of home:     Stairs to enter the home:         Ramp:     Stairs inside the home:         Help at home:    Equipment owned:       Employment:      Hobbies/Interests:      Patient goals for therapy:      Pain assessment: Pain present  Location: left cervical /Ratin/10     Objective   CERVICAL:    Posture: forward head and shoulder posture    Neurological: Dermatomes, Myotomes and reflexes normal     Motor Deficit:  Myotomes L R   C4 (shoulder elevation)     C5 (shoulder abduction)     C6 (elbow flexion)     C7 (elbow extension)     C8 (thumb extension)     T1 (finger add/abd)      Strength (lb)       Sensory Deficit, Reflexes, Dural Signs:     AROM: (Major, Moderate, Minimal or Nil loss)  Movement Loss Uriel Mod Min Nil Pain   Protrusion        Flexion   x     Retraction        Extension  x   With pain   Left Rotation  x   With pain   Right Rotation   x     Left Side Bending  x   Feels blocked    Right Side bending   x       SPECIAL TESTS   R L   Spurlings/Quadrant     ULTT 1 (median)     ULTT 2 (ulnar)     ULTT 3 (radial)     Distraction     Alar Ligament Test - -   Transverse Ligament Test - -       Repeated movement testing:       Shoulder Screen:     Static Tests:     Other Tests:   - Supine: Deep Neck Flexor Activation:  - Spinal mobility:     T-spine-      C-spine-                    Assessment & Plan   CLINICAL IMPRESSIONS  Medical Diagnosis: cervical radiculopathy    Treatment Diagnosis: left cervical pain, left upper extremity pain   Impression/Assessment: Patient is a 53 year old female with cervical complaints.  The following significant findings have been identified: Pain, Decreased ROM/flexibility, and Decreased strength. These impairments interfere with their ability to perform self care tasks, work tasks, recreational activities, household chores, driving , household mobility, and community mobility as compared to previous level of function.     Clinical Decision Making (Complexity):  Clinical Presentation: Stable/Uncomplicated  Clinical Presentation Rationale: based on medical and personal factors listed in PT evaluation  Clinical Decision Making (Complexity): Low complexity    PLAN OF CARE  Treatment Interventions:  Modalities: Cryotherapy  Interventions: Manual Therapy, Therapeutic Exercise    Long Term Goals     PT Goal 1  Goal Identifier: sleeping  Goal Description: sleep is disturbed for less than 1 hour per night  Rationale: to maximize safety and independence with performance of ADLs and functional tasks;to maximize safety and independence within the home;to maximize safety and independence within the community;to maximize safety and independence with transportation;to maximize safety and independence with self cares  Target Date: 02/16/24      Frequency of Treatment: 1x/week  Duration of Treatment: 6 weeks    Recommended Referrals to Other Professionals:   Education Assessment:   Learner/Method: No Barriers to Learning    Risks and benefits of evaluation/treatment have been explained.   Patient/Family/caregiver agrees with Plan of Care.     Evaluation Time:             Signing Clinician: Adolfo Gonzalez, PT

## 2024-01-17 ENCOUNTER — THERAPY VISIT (OUTPATIENT)
Dept: PHYSICAL THERAPY | Facility: CLINIC | Age: 54
End: 2024-01-17
Payer: COMMERCIAL

## 2024-01-17 DIAGNOSIS — M54.12 CERVICAL RADICULOPATHY: Primary | ICD-10-CM

## 2024-01-17 PROCEDURE — 97035 APP MDLTY 1+ULTRASOUND EA 15: CPT | Mod: GP | Performed by: PHYSICAL THERAPIST

## 2024-01-17 PROCEDURE — 97110 THERAPEUTIC EXERCISES: CPT | Mod: GP | Performed by: PHYSICAL THERAPIST

## 2024-01-24 ENCOUNTER — THERAPY VISIT (OUTPATIENT)
Dept: PHYSICAL THERAPY | Facility: CLINIC | Age: 54
End: 2024-01-24
Payer: COMMERCIAL

## 2024-01-24 DIAGNOSIS — M54.12 CERVICAL RADICULOPATHY: Primary | ICD-10-CM

## 2024-01-24 PROCEDURE — 97140 MANUAL THERAPY 1/> REGIONS: CPT | Mod: GP | Performed by: PHYSICAL THERAPIST

## 2024-01-24 PROCEDURE — 97110 THERAPEUTIC EXERCISES: CPT | Mod: GP | Performed by: PHYSICAL THERAPIST

## 2024-01-31 ENCOUNTER — THERAPY VISIT (OUTPATIENT)
Dept: PHYSICAL THERAPY | Facility: CLINIC | Age: 54
End: 2024-01-31
Payer: COMMERCIAL

## 2024-01-31 DIAGNOSIS — M54.12 CERVICAL RADICULOPATHY: Primary | ICD-10-CM

## 2024-01-31 PROCEDURE — 97140 MANUAL THERAPY 1/> REGIONS: CPT | Mod: GP | Performed by: PHYSICAL THERAPIST

## 2024-01-31 PROCEDURE — 97110 THERAPEUTIC EXERCISES: CPT | Mod: GP | Performed by: PHYSICAL THERAPIST

## 2024-02-07 ENCOUNTER — THERAPY VISIT (OUTPATIENT)
Dept: PHYSICAL THERAPY | Facility: CLINIC | Age: 54
End: 2024-02-07
Payer: COMMERCIAL

## 2024-02-07 DIAGNOSIS — M54.12 CERVICAL RADICULOPATHY: Primary | ICD-10-CM

## 2024-02-07 PROCEDURE — 97140 MANUAL THERAPY 1/> REGIONS: CPT | Mod: GP | Performed by: PHYSICAL THERAPIST

## 2024-02-07 PROCEDURE — 97110 THERAPEUTIC EXERCISES: CPT | Mod: GP | Performed by: PHYSICAL THERAPIST

## 2024-02-12 NOTE — PROGRESS NOTES
ASSESSMENT: Sherry Osorio is a 53 year old female presents for consultation at the request of PCP Selena Barnes, who presents today for a follow up patient evaluation of :     -Cervical radiculopathy      Patient feels her neck, left arm pain has improved overall since starting PT. She is taking flexeril rarely for flares with relief but fatigue. We will try robaxin instead.  Patient endorses melany facial numbness whenever cervical traction utilized at PT. She has positive bilateral Shelley, brisk reflexes throughout on exam. We reviewed her cervical spine MRI again which demonstrates a small disc bulge at C5-6 but only mild-moderate canal narrowing. With her reflexes and melany facial numbness I recommended seeing neurology for an opinion.     We talked about continuing PT and considering a left TF HAVEN C5-6 if needed for neck/arm pain control related to the moderate foraminal narrowing on the left at C5-6. Consider neurosurgery opinion if neuro feels her exam findings are coming from the cspine. We discussed the role of gabapentin for symptom management as well. will defer for now and consider this in future          11/27/2023     9:00 AM   OSWESTRY DISABILITY INDEX   Count 9   Sum 10   Oswestry Score (%) 22.22 %            Diagnoses and all orders for this visit:  Cervical radiculopathy  -     methocarbamol (ROBAXIN) 750 MG tablet; Take 1 tablet (750 mg) by mouth 4 times daily as needed for muscle spasms  Facial numbness  -     Adult Neurology  Referral; Future         PLAN:  Reviewed spine anatomy and disease process. Discussed diagnosis and treatment options with the patient today. A shared decision making model was used. The patient's values and choices were respected. The following represents what was discussed and decided upon by the provider and the patient.     -DIAGNOSTIC TESTS:  Images were personally reviewed and interpreted and explained to patient today using spine model.   --  Did  not order any new imaging today    REFERRALS  -  Referred patient to neurology today for bilateral facial numbness and abnormal reflexes    -PHYSICAL THERAPY:    - continue PT  Discussed the importance of core strengthening, ROM, stretching exercises with the patient and how each of these entities is important in decreasing pain.  Explained to the patient that the purpose of physical therapy is to teach the patient a home exercise program.  These exercises need to be performed every day in order to decrease pain and prevent future occurrences of pain.    -MEDICATIONS:    -Recommended over-the-counter Tylenol, ibuprofen or Aleve  - prescribed Robaxin 500 mg 4 times daily as needed today  -Briefly discussed the role for neuropathic agents, however recommended obtaining imaging results prior to starting this type of medication  Discussed multiple medication options today with patient. Discussed risks, side effects, and proper use of medications. Patient verbalized understanding.    -INTERVENTIONS:     discussed the role of cervical epidural steroid injections today. .patient would be a good candidate in the future for either epidural steroid injections or medial branch blocks if indicated based on symptoms    -PATIENT EDUCATION: Total time of 20 minutes, on the day of service, spent with the patient, reviewing the chart, placing orders, and documenting.   -Today we also discussed the issues related to the pros and cons of the current treatment plan.    -FOLLOW-UP:   We will call regarding imaging results    Advised patient to call the [facility] if symptoms worsen or if they develop red flag symptoms such as numbness, weakness, severe pain uncontrolled by current pain med regimen, or any new or worsening problems controlling bladder and bowel function.   ______________________________________________________________________    SUBJECTIVE:     Improvement with  several sessions of PT. Still having neck pain into left  shoulder, left upper outer arm and forearm. Numbness as well, same distrib.  felt like she had worse pain earlier this week with some fine motor difficulties, but then she went to physical therapy and things seem to improve.  She notes whenever cervical traction is performed she has numbess in the melany lower face.  pain is worse with certain sleeping positions and movements.  Pain improves with massage.  She has been taking rare cyclobenzaprine only when needed  at nighttime  which really helps, but makes her feel very tired for 12 hours afterwards.      Per previous visit  HPI:  Sherry Osorio  is a 53 year old female hx osteopenia, anxiety, depression, osteoarthritis who presents today for new patient evaluation of neck pain     She complains of neck pain on a chronic basis, which more recently has progressed into and electrical shocklike radiation of pain into the left upper posterior scalp and left outer upper arm into midway down the forearm.  She rates the pain at a 4 out of 10 today, at worst an 8 out of 10, at best a 2 out of 10.    If she looks up, she feels like she has a block that blocks her range of motion and it brings on a tightness into the shoulder. Sometimes has numbness and tingling in the arm in the same distribution.  It seems to come on at random, sometimes with position of her head, sometimes not.  Sometimes experiences weakness in the left hand.  She does not often drop objects.  She takes Tylenol over-the-counter at times for her symptoms and Flexeril 5 mg sporadically with relief.  She has taken ibuprofen at times, but tries to avoid it due to her history of MTHFR mutation.  She has done physical therapy years ago, and has continued to use these exercises on a daily basis without relief.  She has done chiropractic treatment in the past, but not recently.  She has never had any cervical epidural spinal injections, or previous surgeries.  Her last cervical spine MRI was in 2012.    She has  had vertigo and changes in her vision for several months as well. She has a history of dry eye, blinking does not always clear them. No double vision.  She has had several months of pressure headaches 3x per week, which come and go but recently more often. She did vestibular PT this fall for her dizziness with improvement.      She denies leg symptoms, falls, or changes in bowel or bladder control      -Treatment to Date:     -Medications:  Tylenol  Ibuprofen  Flexeril    Current Outpatient Medications   Medication    aspirin 81 MG tablet    cefPROZIL (CEFZIL) 500 MG tablet    childrens multivitamin with iron (FLINTSTONES COMPLETE) CHEW    cyclobenzaprine (FLEXERIL) 5 MG tablet    methocarbamol (ROBAXIN) 750 MG tablet    sertraline (ZOLOFT) 25 MG tablet    triamcinolone (KENALOG) 0.1 % cream    VITAMIN D PO     No current facility-administered medications for this visit.       No Known Allergies    Past Medical History:   Diagnosis Date    Adjustment disorder with mixed anxiety and depressed mood     escitalopram 5mg daily - really blunted mood; paroxetine: nausea    Arthritis 2010    osteoarthritis...    ASCUS favor benign 2011, 2014    neg HPV Plan cotest in 3 yrs.    Cellulitis of right leg w/ abrasion 09/04/2012    Cervicalgia     Headache     Left ankle injury 09/04/2012    MTHFR mutation     heterozygote - referred to hematology    Pap smear cannot exclude high grade squamous intraepithelial lesion (ASC-H) 03/10/2017    03/10/17: ASC-H pap, Neg HR HPV result.     Puncture wound of left lower leg with complication- cellulitis 09/04/2012    Vertigo summer 2006    rehydrated over night in hosp = better         Patient Active Problem List   Diagnosis    Advanced directives, counseling/discussion    Multiple lipomas    Sebaceous cyst- left upper forehead     Syncope and collapse    Raynaud's phenomenon - noted 10/17/2011 at first visit     Cystocele with uterine descensus- mild w/ both     Cervical pain    Family  history of clotting disorder- sister with MTHFR    Dermatitis- every winter - bilateral flanks and hips    MTHFR mutation - heterozygote    Headache    Chronic neck pain    Mild dysplasia of cervix    DDD (degenerative disc disease), cervical    DDD (degenerative disc disease), thoracic    Cervical spinal stenosis    Chronic constipation    Acute left-sided low back pain without sciatica    Asymptomatic postmenopausal status -early menopause-  since 2012 - some mood swings, but scant hot flashes , no night sweats     Dry eyes- sometimes increased watering and sometimes a bit painful in the am's    Hyperlipidemia LDL goal <130    Methylenetetrahydrofolate reductase deficiency (H24)    Trochanteric bursitis of both hips- left >> right     Adjustment disorder with mixed anxiety and depressed mood- escitalopram 5mg daily - really blunted mood    Cervical radiculopathy       Past Surgical History:   Procedure Laterality Date    BIOPSY  1995; 2017    needle biopsy of mass in left breast;shave biopsy of scalp    COLONOSCOPY  2016    Dr. Diaz [facility]    COLONOSCOPY N/A 2016    Procedure: COLONOSCOPY;  Surgeon: Oscar Diaz MD;  Location: [facility]    EXCISE MASS HEAD  2012    Procedure: EXCISE MASS HEAD;  Excision Left Forehead Mass, Excision Right Posterior Thigh Mass, Excision Left Posterior Thigh Mass ;  Surgeon: Fleix Trivedi MD;  Location: [facility]    EXCISE MASS LOWER EXTREMITY  2012    Procedure: EXCISE MASS LOWER EXTREMITY;;  Surgeon: Felix Trivedi MD;  Location: [facility]    wisdom teeth extraction         Family History   Problem Relation Age of Onset    Asthma Father     Hypertension Father     Cerebrovascular Disease Father     Prostate Cancer Father     Neurologic Disorder Father         Lewy Body dementia - broke hip-  at age 76     Cerebrovascular Disease Sister 47    Thyroid Disease Sister 68        hypothyroid     Blood Disease Sister 68        MTHFR deficiency -  not factor V leiden     Cerebrovascular Disease Sister 15        secondary to MTHFR deficiency     Cancer Sister 30        Breast cancer dx'd 12/2012    Breast Cancer Sister     Blood Disease Sister     Cancer Sister     Depression Sister     Thyroid Disease Sister     Cerebrovascular Disease Sister     Breast Cancer Sister         just had genetic testing done 6/2019 - she's heterozygous MTHFR     Depression Sister     Thyroid Disease Sister     Endometrial Cancer Sister 51        Endometrioid adenocarcinoma grade 1    Other - See Comments Sister         heterozygous MTHFR     Colon Cancer Other         Maternal aunt       Reviewed past medical, surgical, and family history with patient found on new patient intake packet located in EMR Media tab.     SOCIAL HX: Alcohol use, no heavy drinking, no smoking, no recreational drug use    Oswestry (TANIKA) Questionnaire:        11/27/2023     9:00 AM   OSWESTRY DISABILITY INDEX   Count 9   Sum 10   Oswestry Score (%) 22.22 %       Neck Disability Index:      2/13/2024     4:00 PM   Neck Disability Index (  Naresh SUAREZ. and Lindy COULTER. 1991. All rights reserved.; used with permission)   SECTION 1 - PAIN INTENSITY 2   SECTION 2 - PERSONAL CARE 0   SECTION 3 - LIFTING 1   SECTION 4 - READING 1   SECTION 5 - HEADACHES 2   SECTION 6 - CONCENTRATION 0   SECTION 7 - WORK 0   SECTION 8 - DRIVING 1   SECTION 9 - SLEEPING 1   SECTION 10 - RECREATION 0   Count 10   Sum 8   Raw Score: /50 8   Neck Disability Index Score: (%) 16 %          PHQ-2 Score:       2/15/2024     1:05 PM 6/2/2023    12:42 PM   PHQ-2 ( 1999 [employer])   Q1: Little interest or pleasure in doing things 0 1   Q2: Feeling down, depressed or hopeless 0 2   PHQ-2 Score 0 3   Q1: Little interest or pleasure in doing things  Several days   Q2: Feeling down, depressed or hopeless  More than half the days   PHQ-2 Score  3          OBJECTIVE:  /72 (BP Location: Right arm, Patient Position: Sitting, Cuff Size: Adult Regular)   " Pulse 83   Ht 5' 6\" (1.676 m)   Wt 160 lb (72.6 kg)   LMP 04/01/2015   SpO2 98%   BMI 25.82 kg/m      PHYSICAL EXAMINATION:  --CONSTITUTIONAL: Vital signs as above. No acute distress. The patient is well nourished and well groomed.  --PSYCHIATRIC: The patient is awake, alert, oriented to person, place, and time, and answering questions appropriately with clear speech. Appropriate mood and affect   --HEENT: Sclera are non-injected. Extraocular muscles are intact. Moist oral mucosa.  --SKIN: Skin over the face, bilateral upper extremities, and posterior torso is clean, dry, intact without rashes.  --RESPIRATORY: Normal rhythm and effort. No abnormal accessory muscle breathing patterns noted.       --GROSS MOTOR: Easily arises from a seated position.   Able to heel and toe walk    --UPPER EXTREMITY MOTOR TESTING:  Shoulder abduction: right 5/5, left 5/5  Triceps: right 5/5 left 5/5  Biceps:right 5/5  left 5/5,   Hand :  right 5/5  left 5/5,   Intrinsics: right 5/5  left 5/5,   Extensors: right 5/5  left 5/5,   Thumb abduction/adduction 5/5 right, 5/5 left    --LOWER EXTREMITY MOTOR TESTING  Hip flexion: right 5/5  left 5/5,   Quads:right 5/5  left 5/5,   Hamstrings: right 5/5  left 5/5,   Dorsiflexion: right 5/5  left 5/5,   Plantarflexion: right 5/5  left 5/5,   EHL: right 5/5  left 5/5,     REFLEXES: brisk symmetric triceps, brisk R biceps, 0/4 left biceps, brisk brachioradialis bilaterally.brisk  symmetric patellar, 1/4 achilles reflex bilaterally.  Negative Clonus, Babinski  positive Lux's bilaterally.      SENSATION: of the upper and lower extremities is intact to light touch.   --VASCULAR: Warm upper and lower limbs bilaterally.     --CERVICAL SPINE: Inspection reveals no evidence of deformity or swelling. positive tenderness to palpation of left upper/mid trap. No tenderness to palp of R trap, melany scaps, or paraspinal cervical musculature. No cervical spine point tenderness        RESULTS:   Prior " medical records from [facility] were reviewed today.         IMAGING:  Spine imaging was personally reviewed and interpreted today. The images were shown to the patient and the findings were explained using a spine model.       MRI OF THE CERVICAL SPINE WITHOUT CONTRAST Feb 15, 2012 5:21 PM      HISTORY: Cervical radiculopathy.      TECHNIQUE: Multiplanar, multisequence MRI images of the cervical spine   were acquired without intravenous contrast.      COMPARISON: None.      FINDINGS: There is normal alignment of the cervical vertebrae.   Vertebral body heights of the cervical spine are well maintained.   Marrow signal throughout the cervical vertebrae is within normal   limits. There is no evidence for fracture or pathologic bony lesion of   the cervical spine.      There is loss of disc height, disc desiccation and posterior disc   bulging to varying degrees at all levels of the cervical spine with   exception of the relatively normal appearing C7-T1 disc.      The cervical spinal cord is mildly flattened by a posterior disc   osteophyte complex at the C5-C6 level; the cervical spinal cord is   otherwise normal in contour and signal intensity. There is no evidence   for intrathecal abnormality.      Level-by-level:      C2-C3: There is facet arthropathy bilaterally, uncinate hypertrophy   bilaterally and a posterior broad-based disc-osteophyte complex. There   is no spinal canal or neural foraminal stenosis at this level.      C3-C4: There is facet arthropathy bilaterally, uncinate hypertrophy   bilaterally and a posterior broad-based disc-osteophyte complex. There   is no spinal canal or neural foraminal stenosis at this level.      C4-C5: There is facet arthropathy bilaterally, uncinate hypertrophy   bilaterally and a posterior broad-based disc-osteophyte complex. These   findings result in mild spinal canal narrowing and minimal left   foraminal narrowing. There is no right foraminal  stenosis.      C5-C6: There is facet arthropathy bilaterally, uncinate hypertrophy   bilaterally and a posterior broad-based disc-osteophyte complex. These   findings result in moderate left foraminal stenosis, mild right   foraminal narrowing and moderate spinal canal stenosis with mild   flattening of the cervical spinal cord.      C6-C7: There is facet arthropathy bilaterally, uncinate hypertrophy   bilaterally and a posterior broad-based disc-osteophyte complex. There   is no spinal canal or neural foraminal stenosis at this level.      C7-T1: There is no spinal canal or neural foraminal stenosis at this   level.      IMPRESSION: Degenerative changes of the cervical spine as described   above.       Mayra Cash FNP-C  [facility]  O. [phone]

## 2024-02-13 ENCOUNTER — THERAPY VISIT (OUTPATIENT)
Dept: PHYSICAL THERAPY | Facility: CLINIC | Age: 54
End: 2024-02-13
Payer: COMMERCIAL

## 2024-02-13 DIAGNOSIS — M54.12 CERVICAL RADICULOPATHY: Primary | ICD-10-CM

## 2024-02-13 PROCEDURE — 97140 MANUAL THERAPY 1/> REGIONS: CPT | Mod: GP | Performed by: PHYSICAL THERAPIST

## 2024-02-13 PROCEDURE — 97110 THERAPEUTIC EXERCISES: CPT | Mod: GP | Performed by: PHYSICAL THERAPIST

## 2024-02-15 ENCOUNTER — OFFICE VISIT (OUTPATIENT)
Dept: PHYSICAL MEDICINE AND REHAB | Facility: CLINIC | Age: 54
End: 2024-02-15
Payer: COMMERCIAL

## 2024-02-15 VITALS
SYSTOLIC BLOOD PRESSURE: 114 MMHG | HEIGHT: 66 IN | BODY MASS INDEX: 25.71 KG/M2 | OXYGEN SATURATION: 98 % | WEIGHT: 160 LBS | HEART RATE: 83 BPM | DIASTOLIC BLOOD PRESSURE: 72 MMHG

## 2024-02-15 DIAGNOSIS — R20.0 FACIAL NUMBNESS: ICD-10-CM

## 2024-02-15 DIAGNOSIS — M54.12 CERVICAL RADICULOPATHY: Primary | ICD-10-CM

## 2024-02-15 PROCEDURE — 99214 OFFICE O/P EST MOD 30 MIN: CPT | Performed by: NURSE PRACTITIONER

## 2024-02-15 RX ORDER — METHOCARBAMOL 750 MG/1
750 TABLET, FILM COATED ORAL 4 TIMES DAILY PRN
Qty: 40 TABLET | Refills: 0 | Status: SHIPPED | OUTPATIENT
Start: 2024-02-15 | End: 2024-09-09

## 2024-02-15 ASSESSMENT — PAIN SCALES - GENERAL: PAINLEVEL: MILD PAIN (2)

## 2024-02-15 NOTE — PATIENT INSTRUCTIONS
Robaxin (muscle relaxant medication) has been prescribed today. Please take this four times daily. This medication may cause drowsiness. Please do not work or drive while taking this medication until you know how it affects you. If it does make you drowsy, you should only take it before bedtime or at times that you do not have to work/drive.     Continue PT    You have been referred to see the Neurology Department.  Please call [facility] to schedule your appointment, [phone].    [facility]  [address] [place] [address], [address]  [place] [address]    Phone: [phone]  Fax: [phone]     Consider options after your neurology appt - consider gabapentin    ~Please call our [facility] Nurse Navigation line [phone] with any questions or concerns about your treatment plan, if symptoms worsen and you would like to be seen urgently, or if you have any new or worsening numbness, weakness, or problems controlling bladder and bowel function.  ~You are also welcome to contact Mayra Cash via [employer], but please be aware that responses to [employer] message may take 2-3 days due to the high volume of patients seen in clinic.

## 2024-02-15 NOTE — LETTER
2/15/2024         RE: Sherry Osorio  [address] [place] [address]        Dear Colleague,    Thank you for referring your patient, Sherry Osorio, to the [facility]. Please see a copy of my visit note below.    ASSESSMENT: Sherry Osorio is a 53 year old female presents for consultation at the request of PCP Selena Barnes, who presents today for a follow up patient evaluation of :     -Cervical radiculopathy      Patient feels her neck, left arm pain has improved overall since starting PT. She is taking flexeril rarely for flares with relief but fatigue. We will try robaxin instead.  Patient endorses melany facial numbness whenever cervical traction utilized at PT. She has positive bilateral Shelley, brisk reflexes throughout on exam. We reviewed her cervical spine MRI again which demonstrates a small disc bulge at C5-6 but only mild-moderate canal narrowing. With her reflexes and melany facial numbness I recommended seeing neurology for an opinion.     We talked about continuing PT and considering a left TF HAVEN C5-6 if needed for neck/arm pain control related to the moderate foraminal narrowing on the left at C5-6. Consider neurosurgery opinion if neuro feels her exam findings are coming from the cspine. We discussed the role of gabapentin for symptom management as well. will defer for now and consider this in future          11/27/2023     9:00 AM   OSWESTRY DISABILITY INDEX   Count 9   Sum 10   Oswestry Score (%) 22.22 %            Diagnoses and all orders for this visit:  Cervical radiculopathy  -     methocarbamol (ROBAXIN) 750 MG tablet; Take 1 tablet (750 mg) by mouth 4 times daily as needed for muscle spasms  Facial numbness  -     Adult Neurology  Referral; Future         PLAN:  Reviewed spine anatomy and disease process. Discussed diagnosis and treatment options with the patient today. A shared decision making model was used. The patient's  values and choices were respected. The following represents what was discussed and decided upon by the provider and the patient.     -DIAGNOSTIC TESTS:  Images were personally reviewed and interpreted and explained to patient today using spine model.   --  Did not order any new imaging today    REFERRALS  -  Referred patient to neurology today for bilateral facial numbness and abnormal reflexes    -PHYSICAL THERAPY:    - continue PT  Discussed the importance of core strengthening, ROM, stretching exercises with the patient and how each of these entities is important in decreasing pain.  Explained to the patient that the purpose of physical therapy is to teach the patient a home exercise program.  These exercises need to be performed every day in order to decrease pain and prevent future occurrences of pain.    -MEDICATIONS:    -Recommended over-the-counter Tylenol, ibuprofen or Aleve  - prescribed Robaxin 500 mg 4 times daily as needed today  -Briefly discussed the role for neuropathic agents, however recommended obtaining imaging results prior to starting this type of medication  Discussed multiple medication options today with patient. Discussed risks, side effects, and proper use of medications. Patient verbalized understanding.    -INTERVENTIONS:     discussed the role of cervical epidural steroid injections today. .patient would be a good candidate in the future for either epidural steroid injections or medial branch blocks if indicated based on symptoms    -PATIENT EDUCATION: Total time of 20 minutes, on the day of service, spent with the patient, reviewing the chart, placing orders, and documenting.   -Today we also discussed the issues related to the pros and cons of the current treatment plan.    -FOLLOW-UP:   We will call regarding imaging results    Advised patient to call the [facility] if symptoms worsen or if they develop red flag symptoms such as numbness, weakness, severe pain uncontrolled by  current pain med regimen, or any new or worsening problems controlling bladder and bowel function.   ______________________________________________________________________    SUBJECTIVE:     Improvement with  several sessions of PT. Still having neck pain into left shoulder, left upper outer arm and forearm. Numbness as well, same distrib.  felt like she had worse pain earlier this week with some fine motor difficulties, but then she went to physical therapy and things seem to improve.  She notes whenever cervical traction is performed she has numbess in the melany lower face.  pain is worse with certain sleeping positions and movements.  Pain improves with massage.  She has been taking rare cyclobenzaprine only when needed  at nighttime  which really helps, but makes her feel very tired for 12 hours afterwards.      Per previous visit  HPI:  Sherry Osorio  is a 53 year old female hx osteopenia, anxiety, depression, osteoarthritis who presents today for new patient evaluation of neck pain     She complains of neck pain on a chronic basis, which more recently has progressed into and electrical shocklike radiation of pain into the left upper posterior scalp and left outer upper arm into midway down the forearm.  She rates the pain at a 4 out of 10 today, at worst an 8 out of 10, at best a 2 out of 10.    If she looks up, she feels like she has a block that blocks her range of motion and it brings on a tightness into the shoulder. Sometimes has numbness and tingling in the arm in the same distribution.  It seems to come on at random, sometimes with position of her head, sometimes not.  Sometimes experiences weakness in the left hand.  She does not often drop objects.  She takes Tylenol over-the-counter at times for her symptoms and Flexeril 5 mg sporadically with relief.  She has taken ibuprofen at times, but tries to avoid it due to her history of MTHFR mutation.  She has done physical therapy years ago, and has  continued to use these exercises on a daily basis without relief.  She has done chiropractic treatment in the past, but not recently.  She has never had any cervical epidural spinal injections, or previous surgeries.  Her last cervical spine MRI was in 2012.    She has had vertigo and changes in her vision for several months as well. She has a history of dry eye, blinking does not always clear them. No double vision.  She has had several months of pressure headaches 3x per week, which come and go but recently more often. She did vestibular PT this fall for her dizziness with improvement.      She denies leg symptoms, falls, or changes in bowel or bladder control      -Treatment to Date:     -Medications:  Tylenol  Ibuprofen  Flexeril    Current Outpatient Medications   Medication     aspirin 81 MG tablet     cefPROZIL (CEFZIL) 500 MG tablet     childrens multivitamin with iron (FLINTSTONES COMPLETE) CHEW     cyclobenzaprine (FLEXERIL) 5 MG tablet     methocarbamol (ROBAXIN) 750 MG tablet     sertraline (ZOLOFT) 25 MG tablet     triamcinolone (KENALOG) 0.1 % cream     VITAMIN D PO     No current facility-administered medications for this visit.       No Known Allergies    Past Medical History:   Diagnosis Date     Adjustment disorder with mixed anxiety and depressed mood     escitalopram 5mg daily - really blunted mood; paroxetine: nausea     Arthritis 2010    osteoarthritis...     ASCUS favor benign 2011, 2014    neg HPV Plan cotest in 3 yrs.     Cellulitis of right leg w/ abrasion 09/04/2012     Cervicalgia      Headache      Left ankle injury 09/04/2012     MTHFR mutation     heterozygote - referred to hematology     Pap smear cannot exclude high grade squamous intraepithelial lesion (ASC-H) 03/10/2017    03/10/17: ASC-H pap, Neg HR HPV result.      Puncture wound of left lower leg with complication- cellulitis 09/04/2012     Vertigo summer 2006    rehydrated over night in hosp = better         Patient Active  Problem List   Diagnosis     Advanced directives, counseling/discussion     Multiple lipomas     Sebaceous cyst- left upper forehead      Syncope and collapse     Raynaud's phenomenon - noted 10/17/2011 at first visit      Cystocele with uterine descensus- mild w/ both      Cervical pain     Family history of clotting disorder- sister with MTHFR     Dermatitis- every winter - bilateral flanks and hips     MTHFR mutation - heterozygote     Headache     Chronic neck pain     Mild dysplasia of cervix     DDD (degenerative disc disease), cervical     DDD (degenerative disc disease), thoracic     Cervical spinal stenosis     Chronic constipation     Acute left-sided low back pain without sciatica     Asymptomatic postmenopausal status -early menopause-  since 4/2012 - some mood swings, but scant hot flashes , no night sweats      Dry eyes- sometimes increased watering and sometimes a bit painful in the am's     Hyperlipidemia LDL goal <130     Methylenetetrahydrofolate reductase deficiency (H24)     Trochanteric bursitis of both hips- left >> right      Adjustment disorder with mixed anxiety and depressed mood- escitalopram 5mg daily - really blunted mood     Cervical radiculopathy       Past Surgical History:   Procedure Laterality Date     BIOPSY  8/1995; 11/2017    needle biopsy of mass in left breast;shave biopsy of scalp     COLONOSCOPY  2/11/2016    Dr. Diaz [facility]     COLONOSCOPY N/A 2/11/2016    Procedure: COLONOSCOPY;  Surgeon: Oscar Diaz MD;  Location: [facility]     EXCISE MASS HEAD  12/11/2012    Procedure: EXCISE MASS HEAD;  Excision Left Forehead Mass, Excision Right Posterior Thigh Mass, Excision Left Posterior Thigh Mass ;  Surgeon: Felix Trivedi MD;  Location: [facility] OR     EXCISE MASS LOWER EXTREMITY  12/11/2012    Procedure: EXCISE MASS LOWER EXTREMITY;;  Surgeon: Felix Trivedi MD;  Location: [facility] OR     wisdom teeth extraction         Family History   Problem Relation Age of Onset      Asthma Father      Hypertension Father      Cerebrovascular Disease Father      Prostate Cancer Father      Neurologic Disorder Father         Lewy Body dementia - broke hip-  at age 76      Cerebrovascular Disease Sister 47     Thyroid Disease Sister 68        hypothyroid      Blood Disease Sister 68        MTHFR deficiency - not factor V leiden      Cerebrovascular Disease Sister 15        secondary to MTHFR deficiency      Cancer Sister 30        Breast cancer dx'd 2012     Breast Cancer Sister      Blood Disease Sister      Cancer Sister      Depression Sister      Thyroid Disease Sister      Cerebrovascular Disease Sister      Breast Cancer Sister         just had genetic testing done 2019 - she's heterozygous MTHFR      Depression Sister      Thyroid Disease Sister      Endometrial Cancer Sister 51        Endometrioid adenocarcinoma grade 1     Other - See Comments Sister         heterozygous MTHFR      Colon Cancer Other         Maternal aunt       Reviewed past medical, surgical, and family history with patient found on new patient intake packet located in EMR Media tab.     SOCIAL HX: Alcohol use, no heavy drinking, no smoking, no recreational drug use    Oswestry (TANIKA) Questionnaire:        2023     9:00 AM   OSWESTRY DISABILITY INDEX   Count 9   Sum 10   Oswestry Score (%) 22.22 %       Neck Disability Index:      2024     4:00 PM   Neck Disability Index (  Naresh H. and Lindy C. 1991. All rights reserved.; used with permission)   SECTION 1 - PAIN INTENSITY 2   SECTION 2 - PERSONAL CARE 0   SECTION 3 - LIFTING 1   SECTION 4 - READING 1   SECTION 5 - HEADACHES 2   SECTION 6 - CONCENTRATION 0   SECTION 7 - WORK 0   SECTION 8 - DRIVING 1   SECTION 9 - SLEEPING 1   SECTION 10 - RECREATION 0   Count 10   Sum 8   Raw Score: /50 8   Neck Disability Index Score: (%) 16 %          PHQ-2 Score:       2/15/2024     1:05 PM 2023    12:42 PM   PHQ-2 (  [employer])   Q1: Little interest or  "pleasure in doing things 0 1   Q2: Feeling down, depressed or hopeless 0 2   PHQ-2 Score 0 3   Q1: Little interest or pleasure in doing things  Several days   Q2: Feeling down, depressed or hopeless  More than half the days   PHQ-2 Score  3          OBJECTIVE:  /72 (BP Location: Right arm, Patient Position: Sitting, Cuff Size: Adult Regular)   Pulse 83   Ht 5' 6\" (1.676 m)   Wt 160 lb (72.6 kg)   LMP 04/01/2015   SpO2 98%   BMI 25.82 kg/m      PHYSICAL EXAMINATION:  --CONSTITUTIONAL: Vital signs as above. No acute distress. The patient is well nourished and well groomed.  --PSYCHIATRIC: The patient is awake, alert, oriented to person, place, and time, and answering questions appropriately with clear speech. Appropriate mood and affect   --HEENT: Sclera are non-injected. Extraocular muscles are intact. Moist oral mucosa.  --SKIN: Skin over the face, bilateral upper extremities, and posterior torso is clean, dry, intact without rashes.  --RESPIRATORY: Normal rhythm and effort. No abnormal accessory muscle breathing patterns noted.       --GROSS MOTOR: Easily arises from a seated position.   Able to heel and toe walk    --UPPER EXTREMITY MOTOR TESTING:  Shoulder abduction: right 5/5, left 5/5  Triceps: right 5/5 left 5/5  Biceps:right 5/5  left 5/5,   Hand :  right 5/5  left 5/5,   Intrinsics: right 5/5  left 5/5,   Extensors: right 5/5  left 5/5,   Thumb abduction/adduction 5/5 right, 5/5 left    --LOWER EXTREMITY MOTOR TESTING  Hip flexion: right 5/5  left 5/5,   Quads:right 5/5  left 5/5,   Hamstrings: right 5/5  left 5/5,   Dorsiflexion: right 5/5  left 5/5,   Plantarflexion: right 5/5  left 5/5,   EHL: right 5/5  left 5/5,     REFLEXES: brisk symmetric triceps, brisk R biceps, 0/4 left biceps, brisk brachioradialis bilaterally.brisk  symmetric patellar, 1/4 achilles reflex bilaterally.  Negative Clonus, Babinski  positive Lux's bilaterally.      SENSATION: of the upper and lower extremities is " intact to light touch.   --VASCULAR: Warm upper and lower limbs bilaterally.     --CERVICAL SPINE: Inspection reveals no evidence of deformity or swelling. positive tenderness to palpation of left upper/mid trap. No tenderness to palp of R trap, melany scaps, or paraspinal cervical musculature. No cervical spine point tenderness        RESULTS:   Prior medical records from [facility] and [facility] Everywhere were reviewed today.         IMAGING:  Spine imaging was personally reviewed and interpreted today. The images were shown to the patient and the findings were explained using a spine model.       MRI OF THE CERVICAL SPINE WITHOUT CONTRAST Feb 15, 2012 5:21 PM      HISTORY: Cervical radiculopathy.      TECHNIQUE: Multiplanar, multisequence MRI images of the cervical spine   were acquired without intravenous contrast.      COMPARISON: None.      FINDINGS: There is normal alignment of the cervical vertebrae.   Vertebral body heights of the cervical spine are well maintained.   Marrow signal throughout the cervical vertebrae is within normal   limits. There is no evidence for fracture or pathologic bony lesion of   the cervical spine.      There is loss of disc height, disc desiccation and posterior disc   bulging to varying degrees at all levels of the cervical spine with   exception of the relatively normal appearing C7-T1 disc.      The cervical spinal cord is mildly flattened by a posterior disc   osteophyte complex at the C5-C6 level; the cervical spinal cord is   otherwise normal in contour and signal intensity. There is no evidence   for intrathecal abnormality.      Level-by-level:      C2-C3: There is facet arthropathy bilaterally, uncinate hypertrophy   bilaterally and a posterior broad-based disc-osteophyte complex. There   is no spinal canal or neural foraminal stenosis at this level.      C3-C4: There is facet arthropathy bilaterally, uncinate hypertrophy   bilaterally and a posterior broad-based  disc-osteophyte complex. There   is no spinal canal or neural foraminal stenosis at this level.      C4-C5: There is facet arthropathy bilaterally, uncinate hypertrophy   bilaterally and a posterior broad-based disc-osteophyte complex. These   findings result in mild spinal canal narrowing and minimal left   foraminal narrowing. There is no right foraminal stenosis.      C5-C6: There is facet arthropathy bilaterally, uncinate hypertrophy   bilaterally and a posterior broad-based disc-osteophyte complex. These   findings result in moderate left foraminal stenosis, mild right   foraminal narrowing and moderate spinal canal stenosis with mild   flattening of the cervical spinal cord.      C6-C7: There is facet arthropathy bilaterally, uncinate hypertrophy   bilaterally and a posterior broad-based disc-osteophyte complex. There   is no spinal canal or neural foraminal stenosis at this level.      C7-T1: There is no spinal canal or neural foraminal stenosis at this   level.      IMPRESSION: Degenerative changes of the cervical spine as described   above.       Mayra Cash P-C  [facility]  O. [phone]      Again, thank you for allowing me to participate in the care of your patient.        Sincerely,        Mayra Cash, AIMEE CNP

## 2024-03-01 ENCOUNTER — PATIENT OUTREACH (OUTPATIENT)
Dept: CARE COORDINATION | Facility: CLINIC | Age: 54
End: 2024-03-01
Payer: COMMERCIAL

## 2024-03-04 ENCOUNTER — THERAPY VISIT (OUTPATIENT)
Dept: PHYSICAL THERAPY | Facility: CLINIC | Age: 54
End: 2024-03-04
Payer: COMMERCIAL

## 2024-03-04 DIAGNOSIS — M54.12 CERVICAL RADICULOPATHY: Primary | ICD-10-CM

## 2024-03-04 PROCEDURE — 97110 THERAPEUTIC EXERCISES: CPT | Mod: GP | Performed by: PHYSICAL THERAPIST

## 2024-03-04 PROCEDURE — 97140 MANUAL THERAPY 1/> REGIONS: CPT | Mod: GP | Performed by: PHYSICAL THERAPIST

## 2024-03-15 ENCOUNTER — THERAPY VISIT (OUTPATIENT)
Dept: PHYSICAL THERAPY | Facility: CLINIC | Age: 54
End: 2024-03-15
Payer: COMMERCIAL

## 2024-03-15 DIAGNOSIS — M54.12 CERVICAL RADICULOPATHY: Primary | ICD-10-CM

## 2024-03-15 PROCEDURE — 97035 APP MDLTY 1+ULTRASOUND EA 15: CPT | Mod: GP | Performed by: PHYSICAL THERAPIST

## 2024-03-15 PROCEDURE — 97110 THERAPEUTIC EXERCISES: CPT | Mod: GP | Performed by: PHYSICAL THERAPIST

## 2024-03-15 PROCEDURE — 97140 MANUAL THERAPY 1/> REGIONS: CPT | Mod: GP | Performed by: PHYSICAL THERAPIST

## 2024-03-21 ENCOUNTER — THERAPY VISIT (OUTPATIENT)
Dept: PHYSICAL THERAPY | Facility: CLINIC | Age: 54
End: 2024-03-21
Payer: COMMERCIAL

## 2024-03-21 DIAGNOSIS — M54.12 CERVICAL RADICULOPATHY: Primary | ICD-10-CM

## 2024-03-21 PROCEDURE — 97110 THERAPEUTIC EXERCISES: CPT | Mod: GP | Performed by: PHYSICAL THERAPIST

## 2024-03-21 PROCEDURE — 97140 MANUAL THERAPY 1/> REGIONS: CPT | Mod: GP | Performed by: PHYSICAL THERAPIST

## 2024-03-21 PROCEDURE — 97035 APP MDLTY 1+ULTRASOUND EA 15: CPT | Mod: GP | Performed by: PHYSICAL THERAPIST

## 2024-03-27 ENCOUNTER — THERAPY VISIT (OUTPATIENT)
Dept: PHYSICAL THERAPY | Facility: CLINIC | Age: 54
End: 2024-03-27
Payer: COMMERCIAL

## 2024-03-27 DIAGNOSIS — M54.12 CERVICAL RADICULOPATHY: Primary | ICD-10-CM

## 2024-03-27 PROCEDURE — 97140 MANUAL THERAPY 1/> REGIONS: CPT | Mod: GP | Performed by: PHYSICAL THERAPIST

## 2024-03-27 PROCEDURE — 97110 THERAPEUTIC EXERCISES: CPT | Mod: GP | Performed by: PHYSICAL THERAPIST

## 2024-03-27 PROCEDURE — 97035 APP MDLTY 1+ULTRASOUND EA 15: CPT | Mod: GP | Performed by: PHYSICAL THERAPIST

## 2024-04-02 ENCOUNTER — TELEPHONE (OUTPATIENT)
Dept: NEUROLOGY | Facility: CLINIC | Age: 54
End: 2024-04-02
Payer: COMMERCIAL

## 2024-04-02 NOTE — TELEPHONE ENCOUNTER
Message was left for patient to reschedule appointment from April 8, to April 3 at 8a.    Patient advised to call [phone] if interested in moving clinic date up a few days.

## 2024-04-03 ENCOUNTER — THERAPY VISIT (OUTPATIENT)
Dept: PHYSICAL THERAPY | Facility: CLINIC | Age: 54
End: 2024-04-03
Payer: COMMERCIAL

## 2024-04-03 DIAGNOSIS — M54.12 CERVICAL RADICULOPATHY: Primary | ICD-10-CM

## 2024-04-03 PROCEDURE — 97140 MANUAL THERAPY 1/> REGIONS: CPT | Mod: GP | Performed by: PHYSICAL THERAPIST

## 2024-04-03 PROCEDURE — 97110 THERAPEUTIC EXERCISES: CPT | Mod: GP | Performed by: PHYSICAL THERAPIST

## 2024-04-03 PROCEDURE — 97035 APP MDLTY 1+ULTRASOUND EA 15: CPT | Mod: GP | Performed by: PHYSICAL THERAPIST

## 2024-04-05 NOTE — PROGRESS NOTES
INITIAL NEUROLOGY CONSULTATION    DATE OF VISIT: 4/8/2024  CLINIC LOCATION: [facility]  MRN: [id number]  PATIENT NAME: Sherry Osorio  YOB: 1970    REASON FOR VISIT: No chief complaint on file.    HISTORY OF PRESENT ILLNESS:                                                    Ms. Sherry Osorio is 53 year old right handed female patient with past medical history of hyperlipidemia and adjustment disorder, who was seen today for facial paresthesia/numbness and positive Shelley sign along with hyperreflexia.    Per patient's report, ***.  She was evaluated at neurosurgery clinic.  On her exam it felt that her reflexes were brisk along with positive bilateral Shelley sign, prompting referral to neurology.    Laboratory evaluation from June 2023 includes unremarkable CMP, except elevated alkaline phosphatase of 124, elevated LDL of 149, normal TSH (0.95), and normal CBC.    Cervical spine MRI from 12/8/2023 demonstrated degenerative changes, including moderate neuroforaminal stenosis bilaterally at C5-6.  Images were personally reviewed and independently interpreted.    No additional useful information is available in Care Everywhere, which was reviewed.  PAST MEDICAL/SURGICAL HISTORY:                                                    I personally reviewed patient's past medical and surgical history with the patient at today's visit.  MEDICATIONS:                                                    I personally reviewed patient's medications and allergies with the patient at today's visit.  ALLERGIES:                                                    No Known Allergies  EXAM:                                                    VITAL SIGNS:   LMP 04/01/2015   Mini-Cog Assessment:       General: pt is in NAD, cooperative.  Skin: normal turgor, moist mucous membranes, no lesions/rashes noticed.  HEENT: ATNC, EOMI, PERRL, white sclera, normal conjunctiva, no nystagmus or ptosis. No  carotid bruits bilaterally.  Respiratory: lung sounds clear to auscultation bilaterally, no crackles, wheezes, rhonchi. Symmetric lung excursion, no accessory respiratory muscle use.  Cardiovascular: normal S1/S2, no murmurs/rubs/gallops.   Abdomen: Not distended.  : deferred.    Neurological:  Mental: alert, follows commands,  /5 with ***/3 on memory recall, no aphasia or dysarthria. Fund of knowledge is {MYAPPROPRIATE:501691}  Cranial Nerves:  CN II: visual acuity - able to accurately count fingers with each eye. Visual fields intact, fundi: discs sharp, no papilledema and normal vessels bilaterally.  CN III, IV, VI: EOM intact, pupils equal and reactive  CN V: facial sensation nl  CN VII: face symmetric, no facial droop  CN VIII: hearing normal  CN IX: palate elevation symmetric, uvula at midline  CN XI SCM normal, shoulder shrug nl  CN XII: tongue midline  Motor: Strength: 5/5 in all major groups of all extremities. Normal tone. No abnormal movements. No pronator drift b/l.  Reflexes: Triceps, biceps, brachioradialis, patellar, and achilles reflexes normal and symmetric. No clonus noted. Toes are down-going b/l.   Sensory: light touch, pinprick, and vibration intact. Romberg: negative.  Coordination: FNF and heel-shin tests intact b/l.   Gait:  Normal, able to tandem walk *** without difficulty.  DATA:   LABS/EEG/IMAGING/OTHER STUDIES: I reviewed pertinent medical records, as detailed in the history of present illness.  ASSESSMENT AND PLAN:      ASSESSMENT: Sherry Osorio is a 53 year old female patient with listed above past medical history, who presents with ***.    We had a detailed discussion with the patient regarding her presenting complaints.  The neurological exam today is ***.    DIAGNOSES:  No diagnosis found.  PLAN: There are no Patient Instructions on file for this visit.    Total Time: *** minutes spent on the date of the encounter doing chart review, history and exam, documentation and  further activities per the note.    Mak Corbett MD  [facility]  (Chart documentation was completed in part with Dragon voice-recognition software. Even though reviewed, some grammatical, spelling, and word errors may remain.)

## 2024-04-08 ENCOUNTER — OFFICE VISIT (OUTPATIENT)
Dept: NEUROLOGY | Facility: CLINIC | Age: 54
End: 2024-04-08
Attending: NURSE PRACTITIONER
Payer: COMMERCIAL

## 2024-04-08 VITALS — SYSTOLIC BLOOD PRESSURE: 127 MMHG | OXYGEN SATURATION: 99 % | DIASTOLIC BLOOD PRESSURE: 78 MMHG | HEART RATE: 70 BPM

## 2024-04-08 DIAGNOSIS — R20.0 FACIAL NUMBNESS: Primary | ICD-10-CM

## 2024-04-08 PROCEDURE — 99205 OFFICE O/P NEW HI 60 MIN: CPT | Performed by: PSYCHIATRY & NEUROLOGY

## 2024-04-08 PROCEDURE — G2211 COMPLEX E/M VISIT ADD ON: HCPCS | Performed by: PSYCHIATRY & NEUROLOGY

## 2024-04-08 NOTE — LETTER
"    4/8/2024         RE: Sherry Osorio  [address] [place] [address]        Dear Colleague,    Thank you for referring your patient, Sherry Osorio, to the [facility]. Please see a copy of my visit note below.    INITIAL NEUROLOGY CONSULTATION    DATE OF VISIT: 4/8/2024  CLINIC LOCATION: [facility]  MRN: [id number]  PATIENT NAME: Sherry Osorio  YOB: 1970    REASON FOR VISIT:   Chief Complaint   Patient presents with     Consult     Facial Numbness     HISTORY OF PRESENT ILLNESS:                                                    Ms. Sherry Osorio is 53 year old right handed female patient with past medical history of hyperlipidemia and adjustment disorder, who was seen today for mainly facial paresthesia/numbness in context of positive Shelley sign along with hyperreflexia.    Per patient's report, symptoms started over a year ago.  She developed bilateral facial numbness around her cheeks with occasional tingling, but did not pay much attention at the time.  Also experiences occasional paresthesias in the left occipital area.  Her eyes might feel swollen.  Symptoms range in severity, but fairly constant.     Then, she developed worsening of the neck pain radiating to the left upper extremity along with occasional paresthesias and was evaluated in neurosurgery clinic (see workup below).  On her exam it felt that her reflexes were brisk along with positive bilateral Shelley sign, but also complained of facial numbness prompting referral to neurology.      She feels improvement in her symptoms with manual traction, done by PT, like \"something is released\", but symptoms come back afterwards.  She denies any additional focal neurological symptoms, so as prior history of significant head injuries, seizures, CNS infections, or strokes.    Family history is positive for stroke, dementia/Alzheimer's disease, and Parkinson's " disease.    Laboratory evaluation from June 2023 includes unremarkable CMP, except elevated alkaline phosphatase of 124, elevated LDL of 149, normal TSH (0.95), and normal CBC.    Cervical spine MRI from 12/8/2023 demonstrated degenerative changes, including moderate neuroforaminal stenosis bilaterally at C5-6.  Images were personally reviewed and independently interpreted.  On my personal review, degree of spinal canal stenosis would range from mild to moderate and slightly progressed compared to 2012 cervical spine MRI study.    According to Care Everywhere, brain MRI with and without contrast from 4/11/2020, performed for vertigo, was unremarkable.  PAST MEDICAL/SURGICAL HISTORY:                                                    I personally reviewed patient's past medical and surgical history with the patient at today's visit.  MEDICATIONS:                                                    I personally reviewed patient's medications and allergies with the patient at today's visit.  ALLERGIES:                                                    No Known Allergies  EXAM:                                                    VITAL SIGNS:   /78 (BP Location: Left arm, Patient Position: Sitting, Cuff Size: Adult Regular)   Pulse 70   LMP 04/01/2015   SpO2 99%   Mini-Cog Assessment:  Mini Cog Assessment  Clock Draw Score: 2 Normal  3 Item Recall: 3 objects recalled  Mini Cog Total Score: 5  Administered by: : Zoie CALI    General: pt is in NAD, cooperative.  Skin: normal turgor, moist mucous membranes, no lesions/rashes noticed.  HEENT: ATNC, EOMI, PERRL, white sclera, normal conjunctiva, no nystagmus or ptosis. No carotid bruits bilaterally.  Respiratory: lung sounds clear to auscultation bilaterally, no crackles, wheezes, rhonchi. Symmetric lung excursion, no accessory respiratory muscle use.  Cardiovascular: normal S1/S2, no murmurs/rubs/gallops.   Abdomen: Not distended.  :  deferred.    Neurological:  Mental: alert, follows commands, Mini Cog Total Score: 5/5 with 3/3 on memory recall, no aphasia or dysarthria. Fund of knowledge is appropriate for age.  Cranial Nerves:  CN II: visual acuity - able to accurately count fingers with each eye. Visual fields intact, fundi: discs sharp, no papilledema and normal vessels bilaterally.  CN III, IV, VI: EOM intact, pupils equal and reactive  CN V: facial sensation nl  CN VII: face symmetric, no facial droop  CN VIII: hearing normal  CN IX: palate elevation symmetric, uvula at midline  CN XI SCM normal, shoulder shrug nl  CN XII: tongue midline  Motor: Strength: 5/5 in all major groups of all extremities. Normal tone. No abnormal movements. No pronator drift b/l.  Reflexes: Triceps, biceps, brachioradialis, and patellar reflexes are somewhat brisk, but symmetric, achilles reflexes are normal bilaterally. No clonus noted. Toes are down-going b/l.  She has positive bilateral Shelley signs.  Sensory: light touch, pinprick, and vibration intact. Romberg: negative.  Coordination: FNF and heel-shin tests intact b/l.   Gait:  Normal, able to tandem walk without difficulty.  DATA:   LABS/EEG/IMAGING/OTHER STUDIES: I reviewed pertinent medical records, as detailed in the history of present illness.  ASSESSMENT AND PLAN:      ASSESSMENT: Sherry Osorio is a 53 year old female patient with listed above past medical history, who presents with constant bilateral facial paresthesias for over a year in context of mild to moderate cervical spinal stenosis.    We had a detailed discussion with the patient regarding her presenting complaints.  The neurological exam today is notable for normal pinprick sensation in V1-V3 on both sides and brisk DTR's with positive Shelley signs bilaterally (probably explained by the degree of central canal narrowing).  However, she reports constant symptoms.  We discussed the differential includes pathology of trigeminal  "nerves or brainstem versus involvement of the vertebral arteries in the neck or head, though the latter would typically produce more focal symptoms.  We decided to pursue brain MRI without contrast along with head and neck MRA's to clarify her symptoms.  If negative, would suggest monitoring.    DIAGNOSES:    ICD-10-CM    1. Facial numbness  R20.0 Adult Neurology  Referral        PLAN: At today's visit we thoroughly discussed various diagnostic possibilities for patient's symptoms, necessary evaluation, and the plan, which includes:  Orders Placed This Encounter   Procedures     MRA Head w/o Contrast Angiogram     MR Angiogram Neck w/o Contrast     MR Brain w/o Contrast     No new medications.     Additional recommendations after the work-up.    Next follow-up appointment is on as needed basis (based on MRI/MRI results).    Total Time: 61 minutes spent on the date of the encounter doing chart review, history and exam, documentation and further activities per the note.    Mak Corbett MD  [facility] Neurology  (Chart documentation was completed in part with Dragon voice-recognition software. Even though reviewed, some grammatical, spelling, and word errors may remain.)            Sherry Osorio is a 53 year old female who presents for:  Chief Complaint   Patient presents with     Consult     Facial Numbness        Initial Vitals:  /78 (BP Location: Left arm, Patient Position: Sitting, Cuff Size: Adult Regular)   Pulse 70   LMP 04/01/2015   SpO2 99%  Estimated body mass index is 25.82 kg/m  as calculated from the following:    Height as of 2/15/24: 1.676 m (5' 6\").    Weight as of 2/15/24: 72.6 kg (160 lb).. There is no height or weight on file to calculate BSA. BP completed using cuff size: regular    Zoie Polk       Again, thank you for allowing me to participate in the care of your patient.        Sincerely,        Mak Corbett MD  "

## 2024-04-08 NOTE — PATIENT INSTRUCTIONS
AFTER VISIT SUMMARY (AVS):    At today's visit we thoroughly discussed various diagnostic possibilities for your symptoms, necessary evaluation, and the plan, which includes:  Orders Placed This Encounter   Procedures    MRA Head w/o Contrast Angiogram    MR Angiogram Neck w/o Contrast    MR Brain w/o Contrast     No new medications.     Additional recommendations after the work-up.    Next follow-up appointment is on as needed basis (based on MRI/MRI results).    Please do not hesitate to call me with any questions or concerns.    Thanks.

## 2024-04-08 NOTE — PROGRESS NOTES
"INITIAL NEUROLOGY CONSULTATION    DATE OF VISIT: 4/8/2024  CLINIC LOCATION: [facility]  MRN: [id number]  PATIENT NAME: Sherry Osorio  YOB: 1970    REASON FOR VISIT:   Chief Complaint   Patient presents with    Consult     Facial Numbness     HISTORY OF PRESENT ILLNESS:                                                    Ms. Sherry Osorio is 53 year old right handed female patient with past medical history of hyperlipidemia and adjustment disorder, who was seen today for mainly facial paresthesia/numbness in context of positive Shelley sign along with hyperreflexia.    Per patient's report, symptoms started over a year ago.  She developed bilateral facial numbness around her cheeks with occasional tingling, but did not pay much attention at the time.  Also experiences occasional paresthesias in the left occipital area.  Her eyes might feel swollen.  Symptoms range in severity, but fairly constant.     Then, she developed worsening of the neck pain radiating to the left upper extremity along with occasional paresthesias and was evaluated in neurosurgery clinic (see workup below).  On her exam it felt that her reflexes were brisk along with positive bilateral Shelley sign, but also complained of facial numbness prompting referral to neurology.      She feels improvement in her symptoms with manual traction, done by PT, like \"something is released\", but symptoms come back afterwards.  She denies any additional focal neurological symptoms, so as prior history of significant head injuries, seizures, CNS infections, or strokes.    Family history is positive for stroke, dementia/Alzheimer's disease, and Parkinson's disease.    Laboratory evaluation from June 2023 includes unremarkable CMP, except elevated alkaline phosphatase of 124, elevated LDL of 149, normal TSH (0.95), and normal CBC.    Cervical spine MRI from 12/8/2023 demonstrated degenerative changes, including moderate " neuroforaminal stenosis bilaterally at C5-6.  Images were personally reviewed and independently interpreted.  On my personal review, degree of spinal canal stenosis would range from mild to moderate and slightly progressed compared to 2012 cervical spine MRI study.    According to Care Everywhere, brain MRI with and without contrast from 4/11/2020, performed for vertigo, was unremarkable.  PAST MEDICAL/SURGICAL HISTORY:                                                    I personally reviewed patient's past medical and surgical history with the patient at today's visit.  MEDICATIONS:                                                    I personally reviewed patient's medications and allergies with the patient at today's visit.  ALLERGIES:                                                    No Known Allergies  EXAM:                                                    VITAL SIGNS:   /78 (BP Location: Left arm, Patient Position: Sitting, Cuff Size: Adult Regular)   Pulse 70   LMP 04/01/2015   SpO2 99%   Mini-Cog Assessment:  Mini Cog Assessment  Clock Draw Score: 2 Normal  3 Item Recall: 3 objects recalled  Mini Cog Total Score: 5  Administered by: : Zoie CALI    General: pt is in NAD, cooperative.  Skin: normal turgor, moist mucous membranes, no lesions/rashes noticed.  HEENT: ATNC, EOMI, PERRL, white sclera, normal conjunctiva, no nystagmus or ptosis. No carotid bruits bilaterally.  Respiratory: lung sounds clear to auscultation bilaterally, no crackles, wheezes, rhonchi. Symmetric lung excursion, no accessory respiratory muscle use.  Cardiovascular: normal S1/S2, no murmurs/rubs/gallops.   Abdomen: Not distended.  : deferred.    Neurological:  Mental: alert, follows commands, Mini Cog Total Score: 5/5 with 3/3 on memory recall, no aphasia or dysarthria. Fund of knowledge is appropriate for age.  Cranial Nerves:  CN II: visual acuity - able to accurately count fingers with each eye. Visual fields intact, fundi:  discs sharp, no papilledema and normal vessels bilaterally.  CN III, IV, VI: EOM intact, pupils equal and reactive  CN V: facial sensation nl  CN VII: face symmetric, no facial droop  CN VIII: hearing normal  CN IX: palate elevation symmetric, uvula at midline  CN XI SCM normal, shoulder shrug nl  CN XII: tongue midline  Motor: Strength: 5/5 in all major groups of all extremities. Normal tone. No abnormal movements. No pronator drift b/l.  Reflexes: Triceps, biceps, brachioradialis, and patellar reflexes are somewhat brisk, but symmetric, achilles reflexes are normal bilaterally. No clonus noted. Toes are down-going b/l.  She has positive bilateral Shelley signs.  Sensory: light touch, pinprick, and vibration intact. Romberg: negative.  Coordination: FNF and heel-shin tests intact b/l.   Gait:  Normal, able to tandem walk without difficulty.  DATA:   LABS/EEG/IMAGING/OTHER STUDIES: I reviewed pertinent medical records, as detailed in the history of present illness.  ASSESSMENT AND PLAN:      ASSESSMENT: Sherry Osorio is a 53 year old female patient with listed above past medical history, who presents with constant bilateral facial paresthesias for over a year in context of mild to moderate cervical spinal stenosis.    We had a detailed discussion with the patient regarding her presenting complaints.  The neurological exam today is notable for normal pinprick sensation in V1-V3 on both sides and brisk DTR's with positive Shelley signs bilaterally (probably explained by the degree of central canal narrowing).  However, she reports constant symptoms.  We discussed the differential includes pathology of trigeminal nerves or brainstem versus involvement of the vertebral arteries in the neck or head, though the latter would typically produce more focal symptoms.  We decided to pursue brain MRI without contrast along with head and neck MRA's to clarify her symptoms.  If negative, would suggest  monitoring.    DIAGNOSES:    ICD-10-CM    1. Facial numbness  R20.0 Adult Neurology  Referral        PLAN: At today's visit we thoroughly discussed various diagnostic possibilities for patient's symptoms, necessary evaluation, and the plan, which includes:  Orders Placed This Encounter   Procedures    MRA Head w/o Contrast Angiogram    MR Angiogram Neck w/o Contrast    MR Brain w/o Contrast     No new medications.     Additional recommendations after the work-up.    Next follow-up appointment is on as needed basis (based on MRI/MRI results).    Total Time: 61 minutes spent on the date of the encounter doing chart review, history and exam, documentation and further activities per the note.    Mak Corbett MD  [facility]  (Chart documentation was completed in part with Dragon voice-recognition software. Even though reviewed, some grammatical, spelling, and word errors may remain.)

## 2024-04-08 NOTE — PROGRESS NOTES
"Sherry Osorio is a 53 year old female who presents for:  Chief Complaint   Patient presents with    Consult     Facial Numbness        Initial Vitals:  /78 (BP Location: Left arm, Patient Position: Sitting, Cuff Size: Adult Regular)   Pulse 70   LMP 04/01/2015   SpO2 99%  Estimated body mass index is 25.82 kg/m  as calculated from the following:    Height as of 2/15/24: 1.676 m (5' 6\").    Weight as of 2/15/24: 72.6 kg (160 lb).. There is no height or weight on file to calculate BSA. BP completed using cuff size: regular    Zoie Polk   "

## 2024-04-09 ENCOUNTER — HOSPITAL ENCOUNTER (OUTPATIENT)
Dept: MAMMOGRAPHY | Facility: CLINIC | Age: 54
Discharge: HOME | End: 2024-04-09
Attending: FAMILY MEDICINE | Admitting: FAMILY MEDICINE
Payer: COMMERCIAL

## 2024-04-09 DIAGNOSIS — Z12.31 VISIT FOR SCREENING MAMMOGRAM: ICD-10-CM

## 2024-04-09 PROCEDURE — 77063 BREAST TOMOSYNTHESIS BI: CPT

## 2024-04-19 ENCOUNTER — HOSPITAL ENCOUNTER (OUTPATIENT)
Dept: MRI IMAGING | Facility: CLINIC | Age: 54
Discharge: HOME | End: 2024-04-19
Attending: PSYCHIATRY & NEUROLOGY | Admitting: PSYCHIATRY & NEUROLOGY
Payer: COMMERCIAL

## 2024-04-19 DIAGNOSIS — R20.0 FACIAL NUMBNESS: ICD-10-CM

## 2024-04-19 PROCEDURE — 70553 MRI BRAIN STEM W/O & W/DYE: CPT

## 2024-04-19 PROCEDURE — 70549 MR ANGIOGRAPH NECK W/O&W/DYE: CPT

## 2024-04-19 PROCEDURE — A9585 GADOBUTROL INJECTION: HCPCS | Performed by: PSYCHIATRY & NEUROLOGY

## 2024-04-19 PROCEDURE — 70544 MR ANGIOGRAPHY HEAD W/O DYE: CPT

## 2024-04-19 PROCEDURE — 255N000002 HC RX 255 OP 636: Performed by: PSYCHIATRY & NEUROLOGY

## 2024-04-19 RX ORDER — GADOBUTROL 604.72 MG/ML
10 INJECTION INTRAVENOUS ONCE
Status: COMPLETED | OUTPATIENT
Start: 2024-04-19 | End: 2024-04-19

## 2024-04-19 RX ADMIN — GADOBUTROL 10 ML: 604.72 INJECTION INTRAVENOUS at 06:54

## 2024-05-03 ENCOUNTER — PATIENT OUTREACH (OUTPATIENT)
Dept: CARE COORDINATION | Facility: CLINIC | Age: 54
End: 2024-05-03
Payer: COMMERCIAL

## 2024-05-13 ENCOUNTER — PATIENT OUTREACH (OUTPATIENT)
Dept: FAMILY MEDICINE | Facility: CLINIC | Age: 54
End: 2024-05-13
Payer: COMMERCIAL

## 2024-05-13 DIAGNOSIS — N87.0 MILD DYSPLASIA OF CERVIX: Primary | ICD-10-CM

## 2024-05-15 PROBLEM — M54.12 CERVICAL RADICULOPATHY: Status: RESOLVED | Noted: 2024-01-05 | Resolved: 2024-05-15

## 2024-05-15 NOTE — PROGRESS NOTES
04/03/24 0500   Appointment Info   Signing clinician's name / credentials [email or web address] [email or web address] pt   Total/Authorized Visits 9 [employer]   Visits Used 9   Medical Diagnosis cervical radiculopathy   PT Tx Diagnosis left cervical pain, left upper extremity pain   Progress Note/Certification   Therapy Frequency 1x/week   Predicted Duration 6 weeks   PT Goal 1   Goal Identifier sleeping   Goal Description sleep is disturbed for less than 1 hour per night   Rationale to maximize safety and independence with performance of ADLs and functional tasks;to maximize safety and independence within the home;to maximize safety and independence within the community;to maximize safety and independence with transportation;to maximize safety and independence with self cares   Target Date 04/25/24   Subjective Report   Subjective Report see neck disability index   Objective Measures   Objective Measures Objective Measure 1   Objective Measure 1   Objective Measure pt still demonstrates muscle guarding SO musculature.   PT Modalities   PT Modalities Cryotherapy;Ultrasound   Cryotherapy   Treatment Detail hep   Ultrasound   Ultrasound Minutes (24449) 8   Ultrasound -Type (does not include 3-5 min prep/cleanup time) Pulsed 50%   Intensity 1.2   Duration (does not include the 3-5 min set up/clean up time) 8 min   Frequency 2 MHz   Location bilat cervical   Positioning prone   Treatment Interventions (PT)   Interventions Therapeutic Procedure/Exercise;Manual Therapy   Therapeutic Procedure/Exercise   Therapeutic Procedures: strength, endurance, ROM, flexibility minutes (77547) 10   Therapeutic Procedures Ther Proc 2;Ther Proc 3;Ther Proc 4   Ther Proc 1 thoracic extension in chair 3 x 10 seconds   Ther Proc 1 - Details cervical isometrics lateral flexion review   Ther Proc 2 cervical isometrics 10 x 5 seconds flexion and extension  (review)   Ther Proc 2 - Details bilateral external rotation 15 x green   Ther Proc 3 cervical rotation isometrices 10 x 5  seconds bilateral   Ther Proc 3 - Details pulldowns HEP   Ther Proc 4 UBE 4 minutes   Ther Proc 4 - Details levator stretch left 3 x 10 seconds   PTRx Ther Proc 1 Pec Stretch Doorway   PTRx Ther Proc 1 - Details 4 x 10 seconds    PTRx Ther Proc 2 Upper Trapezius Stretch   PTRx Ther Proc 2 - Details 3 x 10 seconds bilateral   PTRx Ther Proc 3 theraband diagonals 10x green   PTRx Ther Proc 3 - Details cervical extension all 4's 10x ag   PTRx Ther Proc 4 left arm bow and arrow 10x   Skilled Intervention verbal cueing   Patient Response/Progress tolerated well   Therapeutic Activity   PTRx Ther Act 1 Icing   PTRx Ther Act 1 - Details No Notes   Manual Therapy   Manual Therapy: Mobilization, MFR, MLD, friction massage minutes (25092) 15   Manual Therapy 1 - Details supine 15 minutes   Skilled Intervention manual therapy   Patient Response/Progress tolerated well   Education   Learner/Method No Barriers to Learning   Plan   Home program see PTRX   Plan for next session progress as tolerated   Total Session Time   Timed Code Treatment Minutes 33   Total Treatment Time (sum of timed and untimed services) 33         DISCHARGE  Reason for Discharge: pt progressing towards goals     Equipment Issued: none    Discharge Plan: Patient to continue home program.    Referring Provider:  Mayra Cash

## 2024-05-17 ENCOUNTER — PATIENT OUTREACH (OUTPATIENT)
Dept: CARE COORDINATION | Facility: CLINIC | Age: 54
End: 2024-05-17
Payer: COMMERCIAL

## 2024-05-28 DIAGNOSIS — F43.23 ADJUSTMENT DISORDER WITH MIXED ANXIETY AND DEPRESSED MOOD: ICD-10-CM

## 2024-05-31 RX ORDER — SERTRALINE HYDROCHLORIDE 25 MG/1
TABLET, FILM COATED ORAL
Qty: 90 TABLET | Refills: 0 | Status: SHIPPED | OUTPATIENT
Start: 2024-05-31 | End: 2024-07-09

## 2024-05-31 NOTE — TELEPHONE ENCOUNTER
Future Appointments 5/31/2024 - 11/27/2024        Date Visit Type Length Department Provider     7/9/2024  9:20 AM PREVENTATIVE ADULT 40 min [facility] Selena Barnes MD    Location Instructions:     [facility] is located at [address] [place] [address], along [address] Free parking is available; access the lot by turning [address] from [address] onto [address], then west onto [address].

## 2024-07-08 SDOH — HEALTH STABILITY: PHYSICAL HEALTH: ON AVERAGE, HOW MANY DAYS PER WEEK DO YOU ENGAGE IN MODERATE TO STRENUOUS EXERCISE (LIKE A BRISK WALK)?: 6 DAYS

## 2024-07-08 SDOH — HEALTH STABILITY: PHYSICAL HEALTH: ON AVERAGE, HOW MANY MINUTES DO YOU ENGAGE IN EXERCISE AT THIS LEVEL?: 50 MIN

## 2024-07-08 ASSESSMENT — SOCIAL DETERMINANTS OF HEALTH (SDOH): HOW OFTEN DO YOU GET TOGETHER WITH FRIENDS OR RELATIVES?: ONCE A WEEK

## 2024-07-08 ASSESSMENT — ANXIETY QUESTIONNAIRES
7. FEELING AFRAID AS IF SOMETHING AWFUL MIGHT HAPPEN: NOT AT ALL
3. WORRYING TOO MUCH ABOUT DIFFERENT THINGS: SEVERAL DAYS
1. FEELING NERVOUS, ANXIOUS, OR ON EDGE: SEVERAL DAYS
GAD7 TOTAL SCORE: 5
4. TROUBLE RELAXING: NOT AT ALL
6. BECOMING EASILY ANNOYED OR IRRITABLE: MORE THAN HALF THE DAYS
GAD7 TOTAL SCORE: 5
GAD7 TOTAL SCORE: 5
IF YOU CHECKED OFF ANY PROBLEMS ON THIS QUESTIONNAIRE, HOW DIFFICULT HAVE THESE PROBLEMS MADE IT FOR YOU TO DO YOUR WORK, TAKE CARE OF THINGS AT HOME, OR GET ALONG WITH OTHER PEOPLE: VERY DIFFICULT
7. FEELING AFRAID AS IF SOMETHING AWFUL MIGHT HAPPEN: NOT AT ALL
8. IF YOU CHECKED OFF ANY PROBLEMS, HOW DIFFICULT HAVE THESE MADE IT FOR YOU TO DO YOUR WORK, TAKE CARE OF THINGS AT HOME, OR GET ALONG WITH OTHER PEOPLE?: VERY DIFFICULT
5. BEING SO RESTLESS THAT IT IS HARD TO SIT STILL: NOT AT ALL
2. NOT BEING ABLE TO STOP OR CONTROL WORRYING: SEVERAL DAYS

## 2024-07-08 ASSESSMENT — PATIENT HEALTH QUESTIONNAIRE - PHQ9
SUM OF ALL RESPONSES TO PHQ QUESTIONS 1-9: 9
10. IF YOU CHECKED OFF ANY PROBLEMS, HOW DIFFICULT HAVE THESE PROBLEMS MADE IT FOR YOU TO DO YOUR WORK, TAKE CARE OF THINGS AT HOME, OR GET ALONG WITH OTHER PEOPLE: SOMEWHAT DIFFICULT
SUM OF ALL RESPONSES TO PHQ QUESTIONS 1-9: 9

## 2024-07-09 ENCOUNTER — OFFICE VISIT (OUTPATIENT)
Dept: FAMILY MEDICINE | Facility: CLINIC | Age: 54
End: 2024-07-09
Payer: COMMERCIAL

## 2024-07-09 VITALS
HEIGHT: 66 IN | SYSTOLIC BLOOD PRESSURE: 120 MMHG | OXYGEN SATURATION: 97 % | TEMPERATURE: 97.8 F | HEART RATE: 98 BPM | WEIGHT: 165.8 LBS | RESPIRATION RATE: 18 BRPM | DIASTOLIC BLOOD PRESSURE: 80 MMHG | BODY MASS INDEX: 26.65 KG/M2

## 2024-07-09 DIAGNOSIS — M50.30 DDD (DEGENERATIVE DISC DISEASE), CERVICAL: ICD-10-CM

## 2024-07-09 DIAGNOSIS — M54.12 CERVICAL RADICULOPATHY: ICD-10-CM

## 2024-07-09 DIAGNOSIS — M48.02 CERVICAL SPINAL STENOSIS: ICD-10-CM

## 2024-07-09 DIAGNOSIS — Z80.3 FAMILY HISTORY OF BREAST CANCER: ICD-10-CM

## 2024-07-09 DIAGNOSIS — Z78.0 ASYMPTOMATIC MENOPAUSE: ICD-10-CM

## 2024-07-09 DIAGNOSIS — Z80.41 FAMILY HISTORY OF MALIGNANT NEOPLASM OF OVARY: ICD-10-CM

## 2024-07-09 DIAGNOSIS — Z80.0 FAMILY HISTORY OF COLON CANCER: ICD-10-CM

## 2024-07-09 DIAGNOSIS — E72.12 METHYLENETETRAHYDROFOLATE REDUCTASE DEFICIENCY (H): ICD-10-CM

## 2024-07-09 DIAGNOSIS — F43.23 ADJUSTMENT DISORDER WITH MIXED ANXIETY AND DEPRESSED MOOD: ICD-10-CM

## 2024-07-09 DIAGNOSIS — Z00.01 ENCOUNTER FOR ROUTINE ADULT HEALTH EXAMINATION WITH ABNORMAL FINDINGS: Primary | ICD-10-CM

## 2024-07-09 DIAGNOSIS — Z13.29 SCREENING FOR THYROID DISORDER: ICD-10-CM

## 2024-07-09 DIAGNOSIS — E78.5 HYPERLIPIDEMIA LDL GOAL <130: ICD-10-CM

## 2024-07-09 DIAGNOSIS — Z12.4 CERVICAL CANCER SCREENING: ICD-10-CM

## 2024-07-09 LAB
ALBUMIN SERPL BCG-MCNC: 4.4 G/DL (ref 3.5–5.2)
ALP SERPL-CCNC: 128 U/L (ref 40–150)
ALT SERPL W P-5'-P-CCNC: 19 U/L (ref 0–50)
ANION GAP SERPL CALCULATED.3IONS-SCNC: 9 MMOL/L (ref 7–15)
AST SERPL W P-5'-P-CCNC: 25 U/L (ref 0–45)
BILIRUB SERPL-MCNC: 0.4 MG/DL
BUN SERPL-MCNC: 14.1 MG/DL (ref 6–20)
CALCIUM SERPL-MCNC: 9.3 MG/DL (ref 8.6–10)
CHLORIDE SERPL-SCNC: 105 MMOL/L (ref 98–107)
CHOLEST SERPL-MCNC: 237 MG/DL
CREAT SERPL-MCNC: 0.77 MG/DL (ref 0.51–0.95)
DEPRECATED HCO3 PLAS-SCNC: 27 MMOL/L (ref 22–29)
EGFRCR SERPLBLD CKD-EPI 2021: >90 ML/MIN/1.73M2
ERYTHROCYTE [DISTWIDTH] IN BLOOD BY AUTOMATED COUNT: 12.6 % (ref 10–15)
FASTING STATUS PATIENT QL REPORTED: YES
FASTING STATUS PATIENT QL REPORTED: YES
GLUCOSE SERPL-MCNC: 88 MG/DL (ref 70–99)
HCT VFR BLD AUTO: 42.7 % (ref 35–47)
HDLC SERPL-MCNC: 63 MG/DL
HGB BLD-MCNC: 14.2 G/DL (ref 11.7–15.7)
LDLC SERPL CALC-MCNC: 162 MG/DL
MCH RBC QN AUTO: 28.9 PG (ref 26.5–33)
MCHC RBC AUTO-ENTMCNC: 33.3 G/DL (ref 31.5–36.5)
MCV RBC AUTO: 87 FL (ref 78–100)
NONHDLC SERPL-MCNC: 174 MG/DL
PLATELET # BLD AUTO: 319 10E3/UL (ref 150–450)
POTASSIUM SERPL-SCNC: 4.5 MMOL/L (ref 3.4–5.3)
PROT SERPL-MCNC: 7.2 G/DL (ref 6.4–8.3)
RBC # BLD AUTO: 4.92 10E6/UL (ref 3.8–5.2)
SODIUM SERPL-SCNC: 141 MMOL/L (ref 135–145)
TRIGL SERPL-MCNC: 58 MG/DL
TSH SERPL DL<=0.005 MIU/L-ACNC: 0.68 UIU/ML (ref 0.3–4.2)
WBC # BLD AUTO: 6.1 10E3/UL (ref 4–11)

## 2024-07-09 PROCEDURE — 99214 OFFICE O/P EST MOD 30 MIN: CPT | Mod: 25 | Performed by: FAMILY MEDICINE

## 2024-07-09 PROCEDURE — 87624 HPV HI-RISK TYP POOLED RSLT: CPT | Performed by: FAMILY MEDICINE

## 2024-07-09 PROCEDURE — G0124 SCREEN C/V THIN LAYER BY MD: HCPCS | Performed by: PATHOLOGY

## 2024-07-09 PROCEDURE — G0145 SCR C/V CYTO,THINLAYER,RESCR: HCPCS | Performed by: FAMILY MEDICINE

## 2024-07-09 PROCEDURE — 99396 PREV VISIT EST AGE 40-64: CPT | Performed by: FAMILY MEDICINE

## 2024-07-09 PROCEDURE — 80053 COMPREHEN METABOLIC PANEL: CPT | Performed by: FAMILY MEDICINE

## 2024-07-09 PROCEDURE — 80061 LIPID PANEL: CPT | Performed by: FAMILY MEDICINE

## 2024-07-09 PROCEDURE — 36415 COLL VENOUS BLD VENIPUNCTURE: CPT | Performed by: FAMILY MEDICINE

## 2024-07-09 PROCEDURE — 84443 ASSAY THYROID STIM HORMONE: CPT | Performed by: FAMILY MEDICINE

## 2024-07-09 PROCEDURE — 85027 COMPLETE CBC AUTOMATED: CPT | Performed by: FAMILY MEDICINE

## 2024-07-09 NOTE — PATIENT INSTRUCTIONS
Patient Education   Preventive Care Advice   This is general advice given by our system to help you stay healthy. However, your care team may have specific advice just for you. Please talk to your care team about your preventive care needs.  Nutrition  Eat 5 or more servings of fruits and vegetables each day.  Try wheat bread, brown rice and whole grain pasta (instead of white bread, rice, and pasta).  Get enough calcium and vitamin D. Check the label on foods and aim for 100% of the RDA (recommended daily allowance).  Lifestyle  Exercise at least 150 minutes each week  (30 minutes a day, 5 days a week).  Do muscle strengthening activities 2 days a week. These help control your weight and prevent disease.  No smoking.  Wear sunscreen to prevent skin cancer.  Have a dental exam and cleaning every 6 months.  Yearly exams  See your health care team every year to talk about:  Any changes in your health.  Any medicines your care team has prescribed.  Preventive care, family planning, and ways to prevent chronic diseases.  Shots (vaccines)   HPV shots (up to age 26), if you've never had them before.  Hepatitis B shots (up to age 59), if you've never had them before.  COVID-19 shot: Get this shot when it's due.  Flu shot: Get a flu shot every year.  Tetanus shot: Get a tetanus shot every 10 years.  Pneumococcal, hepatitis A, and RSV shots: Ask your care team if you need these based on your risk.  Shingles shot (for age 50 and up)  General health tests  Diabetes screening:  Starting at age 35, Get screened for diabetes at least every 3 years.  If you are younger than age 35, ask your care team if you should be screened for diabetes.  Cholesterol test: At age 39, start having a cholesterol test every 5 years, or more often if advised.  Bone density scan (DEXA): At age 50, ask your care team if you should have this scan for osteoporosis (brittle bones).  Hepatitis C: Get tested at least once in your life.  STIs (sexually  transmitted infections)  Before age 24: Ask your care team if you should be screened for STIs.  After age 24: Get screened for STIs if you're at risk. You are at risk for STIs (including HIV) if:  You are sexually active with more than one person.  You don't use condoms every time.  You or a partner was diagnosed with a sexually transmitted infection.  If you are at risk for HIV, ask about PrEP medicine to prevent HIV.  Get tested for HIV at least once in your life, whether you are at risk for HIV or not.  Cancer screening tests  Cervical cancer screening: If you have a cervix, begin getting regular cervical cancer screening tests starting at age 21.  Breast cancer scan (mammogram): If you've ever had breasts, begin having regular mammograms starting at age 40. This is a scan to check for breast cancer.  Colon cancer screening: It is important to start screening for colon cancer at age 45.  Have a colonoscopy test every 10 years (or more often if you're at risk) Or, ask your provider about stool tests like a FIT test every year or Cologuard test every 3 years.  To learn more about your testing options, visit:   .  For help making a decision, visit:   https://bit.ly/bl57048.  Prostate cancer screening test: If you have a prostate, ask your care team if a prostate cancer screening test (PSA) at age 55 is right for you.  Lung cancer screening: If you are a current or former smoker ages 50 to 80, ask your care team if ongoing lung cancer screenings are right for you.  For informational purposes only. Not to replace the advice of your health care provider. Copyright   2023 [employer]. All rights reserved. Clinically reviewed by the [facility]. SHERPA assistant [id number] - REV 01/24.  Your Health Risk Assessment indicates you feel you are not in good health    A healthy lifestyle helps keep the body fit and the mind alert. It helps protect you from disease, helps you fight disease, and  helps prevent chronic disease (disease that doesn't go away) from getting worse. This is important as you get older and begin to notice twinges in muscles and joints and a decline in the strength and stamina you once took for granted. A healthy lifestyle includes good healthcare, good nutrition, weight control, recreation, and regular exercise. Avoid harmful substances and do what you can to keep safe. Another part of a healthy lifestyle is stay mentally active and socially involved.    Good healthcare   Have a wellness visit every year.   If you have new symptoms, let us know right away. Don't wait until the next checkup.   Take medicines exactly as prescribed and keep your medicines in a safe place. Tell us if your medicine causes problems.   Healthy diet and weight control   Eat 3 or 4 small, nutritious, low-fat, high-fiber meals a day. Include a variety of fruits, vegetables, and whole-grain foods.   Make sure you get enough calcium in your diet. Calcium, vitamin D, and exercise help prevent osteoporosis (bone thinning).   If you live alone, try eating with others when you can. That way you get a good meal and have company while you eat it.   Try to keep a healthy weight. If you eat more calories than your body uses for energy, it will be stored as fat and you will gain weight.     Recreation   Recreation is not limited to sports and team events. It includes any activity that provides relaxation, interest, enjoyment, and exercise. Recreation provides an outlet for physical, mental, and social energy. It can give a sense of worth and achievement. It can help you stay healthy.    Mental Exercise and Social Involvement  Mental and emotional health is as important as physical health. Keep in touch with friends and family. Stay as active as possible. Continue to learn and challenge yourself.   Things you can do to stay mentally active are:  Learn something new, like a foreign language or musical instrument.   Play  "SCRABBLE or do crossword puzzles. If you cannot find people to play these games with you at home, you can play them with others on your computer through the Internet.   Join a games club--anything from card games to chess or checkers or lawn bowling.   Start a new hobby.   Go back to school.   Volunteer.   Read.   Keep up with world events.  Eating Healthy Foods: Care Instructions  With every meal, you can make healthy food choices. Try to eat a variety of fruits, vegetables, whole grains, lean proteins, and low-fat dairy products. This can help you get the right balance of nutrients, including vitamins and minerals. Small changes add up over time. You can start by adding one healthy food to your meals each day.    Try to make half your plate fruits and vegetables, one-fourth whole grains, and one-fourth lean proteins. Try including dairy with your meals.   Eat more fruits and vegetables. Try to have them with most meals and snacks.   Foods for healthy eating    Fruits    These can be fresh, frozen, canned, or dried.  Try to choose whole fruit rather than fruit juice.  Eat a variety of colors.    Vegetables    These can be fresh, frozen, canned, or dried.  Beans, peas, and lentils count too.    Whole grains    Choose whole-grain breads, cereals, and noodles.  Try brown rice.    Lean proteins    These can include lean meat, poultry, fish, and eggs.  You can also have tofu, beans, peas, lentils, nuts, and seeds.    Dairy    Try milk, yogurt, and cheese.  Choose low-fat or fat-free when you can.  If you need to, use lactose-free milk or fortified plant-based milk products, such as soy milk.    Water    Drink water when you're thirsty.  Limit sugar-sweetened drinks, including soda, fruit drinks, and sports drinks.  Where can you learn more?  Go to https://www.healthwise.net/patiented  Enter T756 in the search box to learn more about \"Eating Healthy Foods: Care Instructions.\"  Current as of: September 20, 2023            "    Content Version: 14.0    3922-5649 [employer].   Care instructions adapted under license by your healthcare professional. If you have questions about a medical condition or this instruction, always ask your healthcare professional. [employer] disclaims any warranty or liability for your use of this information.      Learning About Depression Screening  What is depression screening?  Depression screening is a way to see if you have depression symptoms. It may be done by a doctor or counselor. It's often part of a routine checkup. That's because your mental health is just as important as your physical health.  Depression is a mental health condition that affects how you feel, think, and act. You may:  Have less energy.  Lose interest in your daily activities.  Feel sad and grouchy for a long time.  Depression is very common. It affects people of all ages.  Many things can lead to depression. Some people become depressed after they have a stroke or find out they have a major illness like cancer or heart disease. The death of a loved one or a breakup may lead to depression. It can run in families. Most experts believe that a combination of inherited genes and stressful life events can cause it.  What happens during screening?  You may be asked to fill out a form about your depression symptoms. You and the doctor will discuss your answers. The doctor may ask you more questions to learn more about how you think, act, and feel.  What happens after screening?  If you have symptoms of depression, your doctor will talk to you about your options.  Doctors usually treat depression with medicines or counseling. Often, combining the two works best. Many people don't get help because they think that they'll get over the depression on their own. But people with depression may not get better unless they get treatment.  The cause of depression is not well understood. There may be many factors involved.  "But if you have depression, it's not your fault.  A serious symptom of depression is thinking about death or suicide. If you or someone you care about talks about this or about feeling hopeless, get help right away.  It's important to know that depression can be treated. Medicine, counseling, and self-care may help.  Where can you learn more?  Go to https://www.[employer].net/patiented  Enter T185 in the search box to learn more about \"Learning About Depression Screening.\"  Current as of: June 24, 2023               Content Version: 14.0    1081-6582 [employer].   Care instructions adapted under license by your healthcare professional. If you have questions about a medical condition or this instruction, always ask your healthcare professional. [employer] disclaims any warranty or liability for your use of this information.      Substance Use Disorder: Care Instructions  Overview     You can improve your life and health by stopping your use of alcohol or drugs. When you don't drink or use drugs, you may feel and sleep better. You may get along better with your family, friends, and coworkers. There are medicines and programs that can help with substance use disorder.  How can you care for yourself at home?  Here are some ways to help you stay sober and prevent relapse.  If you have been given medicine to help keep you sober or reduce your cravings, be sure to take it exactly as prescribed.  Talk to your doctor about programs that can help you stop using drugs or drinking alcohol.  Do not keep alcohol or drugs in your home.  Plan ahead. Think about what you'll say if other people ask you to drink or use drugs. Try not to spend time with people who drink or use drugs.  Use the time and money spent on drinking or drugs to do something that's important to you.  Preventing a relapse  Have a plan to deal with relapse. Learn to recognize changes in your thinking that lead you to drink or use " drugs. Get help before you start to drink or use drugs again.  Try to stay away from situations, friends, or places that may lead you to drink or use drugs.  If you feel the need to drink alcohol or use drugs again, seek help right away. Call a trusted friend or family member. Some people get support from organizations such as Narcotics Anonymous or [employer] or from treatment facilities.  If you relapse, get help as soon as you can. Some people make a plan with another person that outlines what they want that person to do for them if they relapse. The plan usually includes how to handle the relapse and who to notify in case of relapse.  Don't give up. Remember that a relapse doesn't mean that you have failed. Use the experience to learn the triggers that lead you to drink or use drugs. Then quit again. Recovery is a lifelong process. Many people have several relapses before they are able to quit for good.  Follow-up care is a key part of your treatment and safety. Be sure to make and go to all appointments, and call your doctor if you are having problems. It's also a good idea to know your test results and keep a list of the medicines you take.  When should you call for help?   Call 911  anytime you think you may need emergency care. For example, call if you or someone else:    Has overdosed or has withdrawal signs. Be sure to tell the emergency workers that you are or someone else is using or trying to quit using drugs. Overdose or withdrawal signs may include:  Losing consciousness.  Seizure.  Seeing or hearing things that aren't there (hallucinations).     Is thinking or talking about suicide or harming others.   Where to get help 24 hours a day, 7 days a week   If you or someone you know talks about suicide, self-harm, a mental health crisis, a substance use crisis, or any other kind of emotional distress, get help right away. You can:    Call the Suicide and Crisis Lifeline at 357.     Call 4-047-906-WQPF  "(1-948.606.6412).     Text HOME to 707105 to access the Crisis Text Line.   Consider saving these numbers in your phone.  Go to [employer] for more information or to chat online.  Call your doctor now or seek immediate medical care if:    You are having withdrawal symptoms. These may include nausea or vomiting, sweating, shakiness, and anxiety.   Watch closely for changes in your health, and be sure to contact your doctor if:    You have a relapse.     You need more help or support to stop.   Where can you learn more?  Go to https://www.[employer].net/patiented  Enter H573 in the search box to learn more about \"Substance Use Disorder: Care Instructions.\"  Current as of: November 15, 2023               Content Version: 14.0    9230-1502 [employer].   Care instructions adapted under license by your healthcare professional. If you have questions about a medical condition or this instruction, always ask your healthcare professional. [employer] disclaims any warranty or liability for your use of this information.    Discussed need for resistance training to help keep bones strong and decrease age -related muscle loss, decreasing insulin resistance and increasing basal metabolic rate to help burn more calories at rest and with exertion.     Look at YouTube for free exercises for basic weight training for biceps, triceps, deltoids, low back, hips, hamstrings, quadriceps, and calf muscles to help strengthen your bones as well as the below exercises.       Clinical References    Resistance Training With Free Weights: Exercises  Introduction  Here are some examples of exercises for resistance training. Start each exercise slowly. Ease off the exercise if you start to have pain.  Your doctor or physical therapist will tell you when you can start these exercises and which ones will work best for you.  How to do the exercises  Chest fly    Lie on a bench or exercise ball, and hold the weights " straight up over your chest. Do not lock your elbows. You can keep them slightly bent if that is comfortable for you.  Slowly lower your arms, keeping them extended, until the weights are level with your chest, or slightly lower.  Slowly raise your arms until you are in the starting position.  Repeat 8 to 12 times.  Rest for a minute, and repeat the exercise.  Arm raise to the side (elbows bent)    Stand with your feet shoulder-width apart and your knees slightly bent. Or sit up straight in a chair.  Hold a 1- to 2-pound weight in each hand. The weight may be a dumbbell, a can of food, or a filled water bottle.  Bend your elbows 90 degrees while keeping them at your sides. With your palms facing in, hold the weights straight in front of you.  Slowly lift the weights and your elbows out to the sides to shoulder level, keeping your elbows bent. Keep your shoulders down and relaxed as you lift. If you find that you are shrugging your shoulders up toward your ears, your weights may be too heavy. Try using lighter weights (or even no weights).  Slowly lower the weights and your elbows until your elbows are back at your sides.  Repeat 8 to 12 times.  Biceps curls    Sit leaning forward with your legs slightly spread and your left hand on your left thigh.  Hold the weight in your right hand, and place your right elbow on your right thigh.  Slowly curl the weight up and toward your chest.  Slowly lower the weight to the original position.  Repeat 8 to 12 times.  Rest for a minute, and repeat the exercise.  Do the same exercise with your other arm.  Follow-up care is a key part of your treatment and safety. Be sure to make and go to all appointments, and call your doctor if you are having problems. It's also a good idea to know your test results and keep a list of the medicines you take.  Current as of: June 6, 2023               Content Version: 13.8    7702-9318 [employer].   Care instructions adapted under  license by your healthcare professional. If you have questions about a medical condition or this instruction, always ask your healthcare professional. [employer] disclaims any warranty or liability for your use of this information.        Resistance Training With Surgical Tubing: Exercises  Introduction  Here are some examples of exercises for resistance training. Start each exercise slowly. Ease off the exercise if you start to have pain.  Your doctor or physical therapist will tell you when you can start these exercises and which ones will work best for you.  How to do the exercises  Side pull    Sit or stand up straight. Grasp an exercise band with your hands about shoulder-width apart.  Raise both arms overhead, palms of your hands facing forward.  Slowly pull one arm down and to the side, bending your elbow and stretching the band until your elbow is at shoulder height. Hold for 1 to 2 seconds.  Slowly return to the starting position with your arms straight up.  Repeat with the other arm.  Repeat 8 to 12 times with each arm.  Overhead pull    Sit or stand up straight. Grasp an exercise band with your hands about shoulder-width apart.  Raise both arms overhead, palms of your hands facing forward.  Slowly pull your hands apart, stretching the band. Hold for 1 to 2 seconds.  Slowly return to the starting position with your arms straight up.  Repeat 8 to 12 times.  Up-down pull    Sit or stand up straight. Grasp an exercise band with your hands about shoulder width apart.  Raise both arms overhead.  Bend your elbows until they are at shoulder height, with the stretched band either behind or in front of your head. Hold for 1 to 2 seconds.  Slowly return to the starting position with your arms straight up.  Repeat 8 to 12 times.  Chest-level pull    Sit or stand up straight. Grasp an exercise band with your hands about shoulder-width apart.  Raise your arms to chest level and bend your elbows.  Slowly  pull your hands apart and your shoulder blades together, stretching the band. Hold for 1 to 2 seconds. Try to keep your hands up at your chest level, and do not pull your shoulders up toward your ears.  Slowly return to your starting position.  Repeat 8 to 12 times.  Hip-level pull    Stand or sit up straight in a chair without arms. Grasp an exercise band with your hands about shoulder-width apart.  Hold your hands at the level of your hips, or near your lap if you are sitting down.  Slowly pull your hands apart, stretching the band. Hold for 1 or 2 seconds.  Slowly return to your starting position.  Repeat 8 to 12 times.  Follow-up care is a key part of your treatment and safety. Be sure to make and go to all appointments, and call your doctor if you are having problems. It's also a good idea to know your test results and keep a list of the medicines you take.  Current as of: June 6, 2023               Content Version: 13.8 2006-2023 [employer].   Care instructions adapted under license by your healthcare professional. If you have questions about a medical condition or this instruction, always ask your healthcare professional. [employer] disclaims any warranty or liability for your use of this information.         Fitness: Increasing Your Core Stability (02:03)  Your health professional recommends that you watch this short online health video.  Learn how to keep your core strong and prevent injury with two simple exercises.  Purpose:  Shows belly tightening and bridging to improve core stability. Emphasizes how a strong core helps prevent injury.  Goal:  The user will learn belly tightening and bridging to improve core stability.      How to watch the video     Scan the QR code   OR Visit the website    [email or web address]   Current as of: July 18, 2023               Content Version: 13.8 2006-2023 [employer].   Care instructions adapted under  license by your healthcare professional. If you have questions about a medical condition or this instruction, always ask your healthcare professional. [employer] disclaims any warranty or liability for your use of this information.          Muscle Conditioning: Exercises  Introduction  Here are some examples of exercises for muscle conditioning. Start each exercise slowly. Ease off the exercise if you start to have pain.  Your doctor or physical therapist will tell you when you can start these exercises and which ones will work best for you.  How to do the exercises  Wall push-ups    When you can do this exercise against a wall comfortably (without your muscles feeling tired), you can try it against a counter. Start with 5 repetitions again and work up to 8 to 12. You can then slowly progress to the end of a couch or a sturdy chair, and finally to the floor.  Stand facing a wall, about 12 to 18 inches away.  Place your hands on the wall at shoulder height.  Slowly bend your elbows and bring your face toward the wall, moving your hips and shoulders forward together.  Push slowly back to the starting position.  Start with 5 repetitions and work up to 8 to 12.  Rest for a minute, and repeat the exercise.    Side-lying leg lift    If this exercise becomes easy, you can add a light weight around your ankle or tie an elastic resistance band to both ankles.  Lie on your side, with your legs extended. Keep your hips straight up and down during this exercise. Do not let your top hip rock toward the back. Support your head with your hand, and place the other hand on the floor near your waist.  Slowly raise your upper leg until it is about in line with your shoulder. Keep your toes pointed forward.  Slowly lower your leg to the starting position.  Repeat 8 to 12 times.  Rest for a minute, and repeat the exercise.  Turn to your other side and do the same exercise with your other leg.  Shallow standing knee  bends    Stand with your hands lightly resting on a counter or chair in front of you with your feet shoulder-width apart.  Slowly bend your knees so that you squat down just like you were going to sit in a chair. Make sure your knees do not go in front of your toes.  Lower yourself about 6 inches. Your heels should remain on the floor at all times.  Rise slowly to a standing position.  Repeat 8 to 12 times.  Rest for a minute, and repeat the exercise.  Follow-up care is a key part of your treatment and safety. Be sure to make and go to all appointments, and call your doctor if you are having problems. It's also a good idea to know your test results and keep a list of the medicines you take.  Current as of: June 6, 2023               Content Version: 13.8 2006-2023 [employer].   Care instructions adapted under license by your healthcare professional. If you have questions about a medical condition or this instruction, always ask your healthcare professional. [employer] disclaims any warranty or liability for your use of this information.         How to Do the Wall Sit Exercise (00:46)  Your health professional recommends that you watch this short online health video.  Learn how to do the wall sit exercise to increase strength and stability in your core and your legs.  Purpose:  Demonstrates the steps required to perform the wall sit exercise to increase strength and stability in the core and legs.  Goal:  The user will learn how to do the wall sit exercise to increase strength and stability in the core and legs.      How to watch the video     Scan the QR code   OR Visit the website    [email or web address]   Current as of: July 18, 2023               Content Version: 13.8    6233-8059 [employer].   Care instructions adapted under license by your healthcare professional. If you have questions about a medical condition or this instruction, always ask  "your healthcare professional. [employer] disclaims any warranty or liability for your use of this information.   Thank you so much or choosing [facility]  for your Health Care. It was a pleasure seeing you at your visit today! Please contact us with any questions or concerns you may have.                   Selena Barnes MD                              To reach your [facility] care team after hours call:   [phone] press #[phone] \"to speak with your care team\".  This will get you to our clinic instead of routing to central [facility]  scheduling.     PLEASE NOTE OUR HOURS HAVE CHANGED secondary to COVID-19 coronavirus pandemic, as we are trying to minimize patient exposure to the virus,  which is now widespread in the [facility].  These hours may change with very little notice.  We apologize for any inconvenience.       Our current clinic hours are:          Monday- Thursday   7:00am - 6:00pm  in person.      Friday  7:00am- 5:00pm                       Saturday and Sunday : Closed to in person and virtual visits        We have telephone and virtual visit times available between    7:00am - 6pm on Monday-Friday as well.                                                Phone:  [phone]      Our pharmacy hours: Monday through Friday 8:00am to 5:00pm                        Saturday - 9:00 am to 12 noon       Sunday : Closed.              Phone:  [phone]              ###  Please note: at this time we are not accepting any walk-in visits. ###      There is also information available at our web site:  [email or web address]    If your provider ordered any lab tests and you do not receive the results within 10 business days, please call the clinic.    If you need a medication refill please contact your pharmacy.  Please allow 3 business days for your refill to be completed.    Our clinic offers telephone visits and e visits.  Please ask one of " your team members to explain more.      Use [employer] (secure email communication and access to your chart) to send your primary care provider a message or make an appointment. Ask someone on your Team how to sign up for [employer].              Please check with your insurance re: coverage for Shingrix ( shingles )vaccine and also Hep B vaccine coverage.     Return in about 2 months (around 9/9/2024) for depression, anxiety, as video visit, w/ Dr Barnes.

## 2024-07-09 NOTE — PROGRESS NOTES
Preventive Care Visit  [facility] [place]  Selena Barnes MD, Family Medicine  Jul 9, 2024    Assessment & Plan :       ICD-10-CM    1. Encounter for routine adult health examination with abnormal findings  Z00.01       2. Cervical cancer screening  Z12.4 Pap Screen with HPV - Recommended Age 30 - 65 Years      3. Hyperlipidemia LDL goal <130  E78.5 Lipid panel reflex to direct LDL Fasting     Comprehensive metabolic panel      4. Methylenetetrahydrofolate reductase deficiency (H24)  E72.12 Comprehensive metabolic panel     CBC with platelets      5. Cervical spinal stenosis- with some left cervical radiculopathy - worse lying down and turning head to the left with a snapping feeling  M48.02       6. DDD (degenerative disc disease), cervical  M50.30       7. Cervical radiculopathy -Cervical spinal stenosis- with some left cervical radiculopathy - worse lying down and turning head to the left with a snapping feeling  M54.12       8. Adjustment disorder with mixed anxiety and depressed mood- escitalopram 5mg daily = really blunted mood, sertraline 25mg helping- desires to increase to 50mg - noted 7/9/2024  F43.23 sertraline (ZOLOFT) 50 MG tablet      9. Screening for thyroid disorder  Z13.29 TSH with free T4 reflex      10. Asymptomatic menopause  Z78.0 DX Bone Density      11. Family history of breast cancer  Z80.3 Adult Oncology/Hematology  Referral      12. Family history of malignant neoplasm of ovary  Z80.41 Adult Oncology/Hematology  Referral      13. Family history of colon cancer  Z80.0 Adult Oncology/Hematology  Referral          Pt will Please check with your insurance re: coverage for Shingrix ( shingles )vaccine and also Hep B vaccine coverage. Pt declined covid-19 vaccine today.     Patient has been advised of split billing requirements and indicates understanding: Yes    Pt wants to increase sertraline to 50mg daily.     Return in about 2 months  "(around 9/9/2024) for depression, anxiety, as video visit, w/ Dr Barnes.      BMI  Estimated body mass index is 26.76 kg/m  as calculated from the following:    Height as of this encounter: 1.676 m (5' 6\").    Weight as of this encounter: 75.2 kg (165 lb 12.8 oz).   Weight management plan: Discussed healthy diet and exercise guidelines    Counseling  Appropriate preventive services were discussed with this patient, including applicable screening as appropriate for fall prevention, nutrition, physical activity, Tobacco-use cessation, weight loss and cognition.  Checklist reviewing preventive services available has been given to the patient.  Reviewed patient's diet, addressing concerns and/or questions.   The patient's PHQ-9 score is consistent with mild depression. She was provided with information regarding depression.       MEDICATIONS:   Orders Placed This Encounter   Medications    sertraline (ZOLOFT) 50 MG tablet     Sig: Take 1 tablet (50 mg) by mouth daily     Dispense:  90 tablet     Refill:  1     Note dosage change, please.          - Continue other medications without change  Work on weight loss  Regular exercise  See Patient Instructions    Aretha Spear is a 53 year old, presenting for the following:  Physical  and the following other medical problems:      1. Encounter for routine adult health examination with abnormal findings    2. Cervical cancer screening    3. Hyperlipidemia LDL goal <130    4. Methylenetetrahydrofolate reductase deficiency (H24)    5. Cervical spinal stenosis- with some left cervical radiculopathy - worse lying down and turning head to the left with a snapping feeling    6. DDD (degenerative disc disease), cervical    7. Cervical radiculopathy -Cervical spinal stenosis- with some left cervical radiculopathy - worse lying down and turning head to the left with a snapping feeling    8. Adjustment disorder with mixed anxiety and depressed mood- escitalopram 5mg daily = " really blunted mood, sertraline 25mg helping- desires to increase to 50mg - noted 7/9/2024    9. Screening for thyroid disorder    10. Asymptomatic menopause    11. Family history of breast cancer    12. Family history of malignant neoplasm of ovary    13. Family history of colon cancer            8/21/2023     8:18 AM   Additional Questions   Roomed by Fatuma MAYNARD        Health Care Directive  Patient does not have a Health Care Directive or Living Will: Discussed advance care planning with patient; however, patient declined at this time.    HPI      Depression and Anxiety   How are you doing with your depression since your last visit? No change - would like to go up on sertraline dosage.   How are you doing with your anxiety since your last visit?  No change  Are you having other symptoms that might be associated with depression or anxiety? No  Have you had a significant life event? No   Do you have any concerns with your use of alcohol or other drugs? No    Social History     Tobacco Use    Smoking status: Never    Smokeless tobacco: Never   Vaping Use    Vaping status: Never Used   Substance Use Topics    Alcohol use: Yes     Alcohol/week: 0.0 - 1.7 standard drinks of alcohol     Comment: maybe 0-1 weekly    Drug use: No     Comment: no herbal meds either          7/10/2023     9:41 AM 8/20/2023    10:06 PM 7/8/2024    11:20 AM   PHQ   PHQ-9 Total Score 7 12 9   Q9: Thoughts of better off dead/self-harm past 2 weeks Not at all Not at all Not at all         7/10/2023     8:35 AM 8/20/2023    10:08 PM 7/8/2024    11:22 AM   CAROL-7 SCORE   Total Score 7 (mild anxiety) 9 (mild anxiety) 5 (mild anxiety)   Total Score 7 9 5         7/8/2024    11:20 AM   Last PHQ-9   1.  Little interest or pleasure in doing things 2   2.  Feeling down, depressed, or hopeless 1   3.  Trouble falling or staying asleep, or sleeping too much 2   4.  Feeling tired or having little energy 1   5.  Poor appetite or overeating 1   6.  Feeling  bad about yourself 1   7.  Trouble concentrating 0   8.  Moving slowly or restless 1   Q9: Thoughts of better off dead/self-harm past 2 weeks 0   PHQ-9 Total Score 9         7/8/2024    11:22 AM   CAROL-7    1. Feeling nervous, anxious, or on edge 1   2. Not being able to stop or control worrying 1   3. Worrying too much about different things 1   4. Trouble relaxing 0   5. Being so restless that it is hard to sit still 0   6. Becoming easily annoyed or irritable 2   7. Feeling afraid, as if something awful might happen 0   CAROL-7 Total Score 5   If you checked any problems, how difficult have they made it for you to do your work, take care of things at home, or get along with other people? Very difficult       Suicide Assessment Five-step Evaluation and Treatment (SAFE-T)          7/8/2024   General Health   How would you rate your overall physical health? (!) FAIR   Feel stress (tense, anxious, or unable to sleep) To some extent      (!) STRESS CONCERN      7/8/2024   Nutrition   Three or more servings of calcium each day? Yes   Diet: Regular (no restrictions)   How many servings of fruit and vegetables per day? (!) 2-3   How many sweetened beverages each day? 0-1            7/8/2024   Exercise   Days per week of moderate/strenous exercise 6 days   Average minutes spent exercising at this level 50 min            7/8/2024   Social Factors   Frequency of gathering with friends or relatives Once a week   Worry food won't last until get money to buy more No   Food not last or not have enough money for food? No   Do you have housing? (Housing is defined as stable permanent housing and does not include staying ouside in a car, in a tent, in an abandoned building, in an overnight shelter, or couch-surfing.) Yes   Are you worried about losing your housing? No   Lack of transportation? No   Unable to get utilities (heat,electricity)? No            7/8/2024   Fall Risk   Fallen 2 or more times in the past year? No   Trouble  with walking or balance? No             7/8/2024   Dental   Dentist two times every year? Yes            7/8/2024   TB Screening   Were you born outside of the [place]? No          Today's PHQ-9 Score:       7/8/2024    11:20 AM   PHQ-9 SCORE   PHQ-9 Total Score MyChart 9 (Mild depression)   PHQ-9 Total Score 9         7/8/2024   Substance Use   Alcohol more than 3/day or more than 7/wk No   Do you use any other substances recreationally? (!) ALCOHOL        Social History     Tobacco Use    Smoking status: Never    Smokeless tobacco: Never   Vaping Use    Vaping status: Never Used   Substance Use Topics    Alcohol use: Yes     Alcohol/week: 0.0 - 1.7 standard drinks of alcohol     Comment: maybe 0-1 weekly    Drug use: No     Comment: no herbal meds either            4/9/2024   LAST FHS-7 RESULTS   1st degree relative breast or ovarian cancer Yes   Any relative bilateral breast cancer No   Any male have breast cancer No   Any ONE woman have BOTH breast AND ovarian cancer Yes   Any woman with breast cancer before 50yrs Yes   2 or more relatives with breast AND/OR ovarian cancer Yes   2 or more relatives with breast AND/OR bowel cancer Yes    Discussed genetic cancer counseling - pt desires referral. - placed.     Mammogram Screening - Mammogram every 1-2 years updated in Health Maintenance based on mutual decision making        7/8/2024   STI Screening   New sexual partner(s) since last STI/HIV test? No        History of abnormal Pap smear: No - age 30- 64 PAP with HPV every 5 years recommended        Latest Ref Rng & Units 6/2/2023     2:10 PM 3/2/2022     3:43 PM 12/16/2020     6:20 PM   PAP / HPV   PAP  Atypical squamous cells of undetermined significance (ASC-US)  Negative for Intraepithelial Lesion or Malignancy (NILM)     HPV 16 DNA Negative Negative  Negative  Negative    HPV 18 DNA Negative Negative  Negative  Negative    Other HR HPV Negative Negative  Negative  Negative      ASCVD Risk   The 10-year  ASCVD risk score (Haydee SOLANO, et al., 2019) is: 1.8%    Values used to calculate the score:      Age: 53 years      Sex: Female      Is Non- : No      Diabetic: No      Tobacco smoker: No      Systolic Blood Pressure: 120 mmHg      Is BP treated: No      HDL Cholesterol: 55 mg/dL      Total Cholesterol: 218 mg/dL    Reviewed and updated as needed this visit by Provider                    Past Medical History:   Diagnosis Date    Adjustment disorder with mixed anxiety and depressed mood     escitalopram 5mg daily - really blunted mood; paroxetine: nausea    Arthritis 2010    osteoarthritis...    ASCUS favor benign ,     neg HPV Plan cotest in 3 yrs.    Cellulitis of right leg w/ abrasion 2012    Cervicalgia     Headache     Left ankle injury 2012    MTHFR mutation     heterozygote - referred to hematology    Pap smear cannot exclude high grade squamous intraepithelial lesion (ASC-H) 03/10/2017    03/10/17: ASC-H pap, Neg HR HPV result.     Puncture wound of left lower leg with complication- cellulitis 2012    Vertigo summer 2006    rehydrated over night in hosp = better      Past Surgical History:   Procedure Laterality Date    BIOPSY  1995; 2017    needle biopsy of mass in left breast;shave biopsy of scalp    COLONOSCOPY  2016    Dr. Diaz [facility]    COLONOSCOPY N/A 2016    Procedure: COLONOSCOPY;  Surgeon: Oscar Diaz MD;  Location: [facility]    EXCISE MASS HEAD  2012    Procedure: EXCISE MASS HEAD;  Excision Left Forehead Mass, Excision Right Posterior Thigh Mass, Excision Left Posterior Thigh Mass ;  Surgeon: Felix Trivedi MD;  Location: [facility] OR    EXCISE MASS LOWER EXTREMITY  2012    Procedure: EXCISE MASS LOWER EXTREMITY;;  Surgeon: Felix Trivedi MD;  Location: [facility] OR    wisdom teeth extraction       OB History    Para Term  AB Living   4 4 4 0 0 4   SAB IAB Ectopic Multiple Live Births   0 0 0 0 0      #  Outcome Date GA Lbr Clement/2nd Weight Sex Type Anes PTL Lv   4 Term            3 Term            2 Term            1 Term               Obstetric Comments    x 4 - no complications with any pregnancies , labors or deliveries     Lab work is in process  Labs reviewed in EPIC  BP Readings from Last 3 Encounters:   24 120/80   24 127/78   02/15/24 114/72    Wt Readings from Last 3 Encounters:   24 75.2 kg (165 lb 12.8 oz)   02/15/24 72.6 kg (160 lb)   23 68 kg (150 lb)                  Patient Active Problem List   Diagnosis    Multiple lipomas    Sebaceous cyst- left upper forehead     Syncope and collapse    Raynaud's phenomenon - noted 10/17/2011 at first visit     Cystocele with uterine descensus- mild w/ both     Cervical pain    Family history of clotting disorder- sister with MTHFR    Dermatitis- every winter - bilateral flanks and hips    MTHFR mutation - heterozygote    Headache    Chronic neck pain    Mild dysplasia of cervix    DDD (degenerative disc disease), cervical    DDD (degenerative disc disease), thoracic    Cervical spinal stenosis    Chronic constipation    Acute left-sided low back pain without sciatica    Asymptomatic postmenopausal status -early menopause-  since 2012 - some mood swings, but scant hot flashes , no night sweats     Dry eyes- sometimes increased watering and sometimes a bit painful in the am's    Hyperlipidemia LDL goal <130    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 Diaz [facility]    COLONOSCOPY N/A 2016    Procedure: COLONOSCOPY;  Surgeon: Oscar Diaz MD;  Location: [facility]    EXCISE MASS HEAD  2012    Procedure: EXCISE MASS HEAD;  Excision Left Forehead Mass, Excision Right Posterior Thigh Mass, Excision Left Posterior Thigh Mass ;  Surgeon: Felix Trivedi MD;  Location: [facility]    EXCISE MASS LOWER EXTREMITY  2012    Procedure: EXCISE MASS LOWER EXTREMITY;;  Surgeon: Felix Trivedi MD;  Location: [facility] OR    wisdom teeth extraction         Social History     Tobacco Use    Smoking status: Never    Smokeless tobacco: Never   Substance Use Topics    Alcohol use: Yes     Alcohol/week: 0.0 - 1.7 standard drinks of alcohol     Comment: maybe 0-1 weekly     Family History   Problem Relation Age of Onset    Hypertension Mother     Neuromuscular Disease Mother         can't walk regularly anymore at age 86    Asthma Father     Hypertension Father     Cerebrovascular Disease Father     Prostate Cancer Father     Neurologic Disorder Father         Lewy Body dementia - broke hip-  at age 76     Cerebrovascular Disease Sister 46        secondary to MTHFR deficiency - homozygous    Breast Cancer Sister 46        dx'd in  just had genetic testing done 2019 - she's heterozygous MTHFR    Depression Sister     Thyroid Disease Sister     Endometrial Cancer Sister 51        Endometrioid adenocarcinoma grade 1- secondary to tamoxifen    Other - See Comments Sister         homoozygous MTHFR    Colon Cancer Other         Maternal aunt    Ovarian Cancer Maternal Cousin 30 - 39         Current Outpatient Medications   Medication Sig Dispense Refill    aspirin 81 MG tablet Take 1 tablet (81 mg) by mouth daily 30 tablet     calcium carbonate-vitamin D (OSCAL) 250-3.125 MG-MCG TABS per tablet Take 1 tablet by mouth 2 times daily      childrens multivitamin with  "iron (FLINTSTONES COMPLETE) CHEW Take 2 tablets by mouth daily 100 tablet 4    cyclobenzaprine (FLEXERIL) 5 MG tablet Take 1-2 tablets (5-10 mg) by mouth 3 times daily as needed for muscle spasms 30 tablet 1    methocarbamol (ROBAXIN) 750 MG tablet Take 1 tablet (750 mg) by mouth 4 times daily as needed for muscle spasms 40 tablet 0    sertraline (ZOLOFT) 50 MG tablet Take 1 tablet (50 mg) by mouth daily 90 tablet 1    triamcinolone (KENALOG) 0.1 % cream Apply topically 3 times daily as needed Apply sparingly to affected area. 80 g 5    VITAMIN D PO        No Known Allergies  Recent Labs   Lab Test 06/30/23  0722 03/02/22  1617 12/29/20  1012 07/03/19  0828   * 167* 171* 138*   HDL 55 67 63 58   TRIG 70 63 95 50   ALT 18 22 27 20   CR 0.81 0.79 0.70 0.72   GFRESTIMATED 86 90 >90 >90   GFRESTBLACK  --   --  >90 >90   POTASSIUM 5.0 4.1 4.3 4.2   TSH 0.95 0.48 1.11 1.25          Review of Systems  Constitutional, HEENT, cardiovascular, pulmonary, GI, , musculoskeletal, neuro, skin, endocrine and psych systems are negative, except as otherwise noted.     Objective    Exam  /80   Pulse 98   Temp 97.8  F (36.6  C) (Oral)   Wt 75.2 kg (165 lb 12.8 oz)   LMP 04/01/2015   SpO2 97%   BMI 26.76 kg/m     Estimated body mass index is 26.76 kg/m  as calculated from the following:    Height as of 2/15/24: 1.676 m (5' 6\").    Weight as of this encounter: 75.2 kg (165 lb 12.8 oz).    Physical Exam  GENERAL: alert and no distress  EYES: Eyes grossly normal to inspection, PERRL and conjunctivae and sclerae normal  HENT: ear canals and TM's normal, nose and mouth without ulcers or lesions  NECK: no adenopathy, no asymmetry, masses, or scars  RESP: lungs clear to auscultation - no rales, rhonchi or wheezes  BREAST: normal without masses, tenderness or nipple discharge and no palpable axillary masses or adenopathy  CV: regular rate and rhythm, normal S1 S2, no S3 or S4, no murmur, click or rub, no peripheral " edema  ABDOMEN: soft, nontender, no hepatosplenomegaly, no masses and bowel sounds normal   (female) w/bimanual: normal female external genitalia, normal urethral meatus, normal vaginal mucosa, and normal cervix/adnexa/uterus without masses or discharge  MS: no gross musculoskeletal defects noted, no edema  SKIN: no suspicious lesions or rashes  NEURO: Normal strength and tone, mentation intact and speech normal  PSYCH: mentation appears normal, affect normal/bright        Signed Electronically by:      Selena Barnes MD    Answers submitted by the patient for this visit:  Patient Health Questionnaire (Submitted on 7/8/2024)  If you checked off any problems, how difficult have these problems made it for you to do your work, take care of things at home, or get along with other people?: Somewhat difficult  PHQ9 TOTAL SCORE: 9  CAROL-7 (Submitted on 7/8/2024)  CAROL 7 TOTAL SCORE: 5

## 2024-07-10 ENCOUNTER — PATIENT OUTREACH (OUTPATIENT)
Dept: ONCOLOGY | Facility: CLINIC | Age: 54
End: 2024-07-10
Payer: COMMERCIAL

## 2024-07-10 LAB
HPV HR 12 DNA CVX QL NAA+PROBE: NEGATIVE
HPV16 DNA CVX QL NAA+PROBE: NEGATIVE
HPV18 DNA CVX QL NAA+PROBE: NEGATIVE
HUMAN PAPILLOMA VIRUS FINAL DIAGNOSIS: NORMAL

## 2024-07-10 NOTE — PROGRESS NOTES
Writer received Cancer Risk Management Program referral, referred for:    Family history of breast cancer. Family history of malignant neoplasm of ovary. Family history of colon cancer.    Referred By    Provider Department Location Phone   Selena Barnes MD [facility] [phone]        Reviewed for appropriate plan, and sent to New Patient Scheduling for completion.

## 2024-07-15 LAB
BKR LAB AP GYN ADEQUACY: ABNORMAL
BKR LAB AP GYN INTERPRETATION: ABNORMAL
BKR LAB AP LMP: ABNORMAL
BKR LAB AP PREVIOUS ABNL DX: ABNORMAL
BKR LAB AP PREVIOUS ABNORMAL: ABNORMAL
PATH REPORT.COMMENTS IMP SPEC: ABNORMAL
PATH REPORT.COMMENTS IMP SPEC: ABNORMAL
PATH REPORT.RELEVANT HX SPEC: ABNORMAL

## 2024-07-22 ENCOUNTER — PATIENT OUTREACH (OUTPATIENT)
Dept: FAMILY MEDICINE | Facility: CLINIC | Age: 54
End: 2024-07-22
Payer: COMMERCIAL

## 2024-07-22 DIAGNOSIS — N87.0 MILD DYSPLASIA OF CERVIX: Primary | ICD-10-CM

## 2024-09-03 DIAGNOSIS — E78.5 HYPERLIPIDEMIA LDL GOAL <130: Primary | ICD-10-CM

## 2024-09-03 RX ORDER — ROSUVASTATIN CALCIUM 5 MG/1
5 TABLET, COATED ORAL DAILY
Qty: 90 TABLET | Refills: 1 | Status: SHIPPED | OUTPATIENT
Start: 2024-09-03

## 2024-09-08 ASSESSMENT — ANXIETY QUESTIONNAIRES
GAD7 TOTAL SCORE: 7
GAD7 TOTAL SCORE: 7
8. IF YOU CHECKED OFF ANY PROBLEMS, HOW DIFFICULT HAVE THESE MADE IT FOR YOU TO DO YOUR WORK, TAKE CARE OF THINGS AT HOME, OR GET ALONG WITH OTHER PEOPLE?: NOT DIFFICULT AT ALL
GAD7 TOTAL SCORE: 7
7. FEELING AFRAID AS IF SOMETHING AWFUL MIGHT HAPPEN: NOT AT ALL

## 2024-09-09 ENCOUNTER — VIRTUAL VISIT (OUTPATIENT)
Dept: FAMILY MEDICINE | Facility: CLINIC | Age: 54
End: 2024-09-09
Payer: COMMERCIAL

## 2024-09-09 DIAGNOSIS — E72.12 METHYLENETETRAHYDROFOLATE REDUCTASE DEFICIENCY (H): ICD-10-CM

## 2024-09-09 DIAGNOSIS — Z15.89 MTHFR MUTATION: ICD-10-CM

## 2024-09-09 DIAGNOSIS — F43.23 ADJUSTMENT DISORDER WITH MIXED ANXIETY AND DEPRESSED MOOD: ICD-10-CM

## 2024-09-09 PROCEDURE — 99213 OFFICE O/P EST LOW 20 MIN: CPT | Mod: 95 | Performed by: FAMILY MEDICINE

## 2024-09-09 RX ORDER — SERTRALINE HYDROCHLORIDE 100 MG/1
100 TABLET, FILM COATED ORAL DAILY
Qty: 90 TABLET | Refills: 1 | Status: SHIPPED | OUTPATIENT
Start: 2024-09-09

## 2024-09-09 RX ORDER — MULTIVIT WITH IRON,MINERALS
2 TABLET,CHEWABLE ORAL DAILY
Qty: 180 TABLET | Refills: 3 | Status: SHIPPED | OUTPATIENT
Start: 2024-09-09

## 2024-09-09 ASSESSMENT — PATIENT HEALTH QUESTIONNAIRE - PHQ9: SUM OF ALL RESPONSES TO PHQ QUESTIONS 1-9: 5

## 2024-09-09 NOTE — PROGRESS NOTES
Beatris is a 54 year old who is being evaluated via a billable video visit.    What phone number would you like to be contacted at? [phone]  How would you like to obtain your AVS? MyChart      Assessment & Plan       ICD-10-CM    1. Adjustment disorder with mixed anxiety and depressed mood- escitalopram 5mg daily = really blunted mood, sertraline 25mg helping- desires to increase to 50mg - noted 7/9/2024  F43.23 sertraline (ZOLOFT) 100 MG tablet      2. Methylenetetrahydrofolate reductase deficiency (H24)  E72.12 childrens multivitamin w/iron (FLINTSTONES COMPLETE) chewable tablet      3. MTHFR mutation - heterozygote  Z15.89 childrens multivitamin w/iron (FLINTSTONES COMPLETE) chewable tablet        Pt would like to continue with 100mg sertraline daily, doesn't want to increase dosage at this time.     Future Appointments 9/9/2024 - 3/8/2025        Date Visit Type Length Department Provider     12/9/2024  8:00 AM LAB 15 min RV LABORATORY RV LAB    Location Instructions:     The clinic is located at [address] [place] [address] in [place].&nbsp; We offer free parking in our on-site lot.                      Return in about 6 months (around 3/9/2025) for depression, anxiety, as video visit, w/ Dr. DRUMMOND for 40 min appt.       MEDICATIONS:   Orders Placed This Encounter   Medications    sertraline (ZOLOFT) 100 MG tablet     Sig: Take 1 tablet (100 mg) by mouth daily.     Dispense:  90 tablet     Refill:  1     ### Profile Rx: patient will contact pharmacy when needed ###    childrens multivitamin w/iron (FLINTSTONES COMPLETE) chewable tablet     Sig: Take 2 tablets by mouth daily.     Dispense:  180 tablet     Refill:  3     Generic please.          - Continue other medications without change  Work on weight loss  Regular exercise  See Patient Instructions       Selena Barnes MD      Subjective   Beatris is a 54 year old, presenting for the following health issues:   1. Adjustment disorder with mixed anxiety and  depressed mood- escitalopram 5mg daily = really blunted mood, sertraline 25mg helping- desires to increase to 50mg - noted 7/9/2024    2. Methylenetetrahydrofolate reductase deficiency (H24)    3. MTHFR mutation - heterozygote        Recheck Medication      9/9/2024     3:57 PM   Additional Questions   Roomed by Jeny AQUINO   Accompanied by self     History of Present Illness       Mental Health Follow-up:  Patient presents to follow-up on Depression & Anxiety.Patient's depression since last visit has been:  Good  The patient is not having other symptoms associated with depression.  Patient's anxiety since last visit has been:  Good  The patient is not having other symptoms associated with anxiety.  Any significant life events: No  Patient is not feeling anxious or having panic attacks.  Patient has no concerns about alcohol or drug use.    She eats 4 or more servings of fruits and vegetables daily.She consumes 0 sweetened beverage(s) daily.She exercises with enough effort to increase her heart rate 30 to 60 minutes per day.  She exercises with enough effort to increase her heart rate 7 days per week. She is missing 1 dose(s) of medications per week.  She is not taking prescribed medications regularly due to remembering to take.       Medication Followup of sertaline :   Taking Medication as prescribed: yes  Side Effects:  None  Medication Helping Symptoms:  yes    Depression and Anxiety :  on 100mg sertraline right now, got some muscle twinges and increased anxiety  on 50mg daily.  No side effects currently , denies any  sexual side effects. Would like to stay where she is re: dosage = 100mg daily.   How are you doing with your depression since your last visit? Improved   How are you doing with your anxiety since your last visit?  Improved   Are you having other symptoms that might be associated with depression or anxiety? No  Have you had a significant life event? No   Do you have any concerns with your use of  alcohol or other drugs? No      Social History     Tobacco Use    Smoking status: Never    Smokeless tobacco: Never   Vaping Use    Vaping status: Never Used   Substance Use Topics    Alcohol use: Yes     Alcohol/week: 0.0 - 1.7 standard drinks of alcohol     Comment: maybe 0-1 weekly    Drug use: No     Comment: no herbal meds either          8/20/2023    10:06 PM 7/8/2024    11:20 AM 9/9/2024     4:28 PM   PHQ   PHQ-9 Total Score 12 9 5   Q9: Thoughts of better off dead/self-harm past 2 weeks Not at all Not at all Not at all         8/20/2023    10:08 PM 7/8/2024    11:22 AM 9/8/2024     5:03 PM   CAROL-7 SCORE   Total Score 9 (mild anxiety) 5 (mild anxiety) 7 (mild anxiety)   Total Score 9 5 7         9/9/2024     4:28 PM   Last PHQ-9   1.  Little interest or pleasure in doing things 1   2.  Feeling down, depressed, or hopeless 0   3.  Trouble falling or staying asleep, or sleeping too much 2   4.  Feeling tired or having little energy 1   5.  Poor appetite or overeating 0   6.  Feeling bad about yourself 0   7.  Trouble concentrating 1   8.  Moving slowly or restless 0   Q9: Thoughts of better off dead/self-harm past 2 weeks 0   PHQ-9 Total Score 5   Difficulty at work, home, or with people Somewhat difficult         9/8/2024     5:03 PM   CAROL-7    1. Feeling nervous, anxious, or on edge 1   2. Not being able to stop or control worrying 1   3. Worrying too much about different things 2   4. Trouble relaxing 1   5. Being so restless that it is hard to sit still 1   6. Becoming easily annoyed or irritable 1   7. Feeling afraid, as if something awful might happen 0   CAROL-7 Total Score 7   If you checked any problems, how difficult have they made it for you to do your work, take care of things at home, or get along with other people? Not difficult at all     awakening at 0230, then can't get back to sleep till 4-4:30am, then sleeps really hard until 0600 - better since back to school       Suicide Assessment Five-step  Evaluation and Treatment (SAFE-T)    How many servings of fruits and vegetables do you eat daily?  4 or more  On average, how many sweetened beverages do you drink each day (Examples: soda, juice, sweet tea, etc.  Do NOT count diet or artificially sweetened beverages)?   0  How many days per week do you exercise enough to make your heart beat faster? 6  How many minutes a day do you exercise enough to make your heart beat faster? 20 - 29  How many days per week do you miss taking your medication? 0      Review of Systems:   Constitutional, HEENT, cardiovascular, pulmonary, GI, , musculoskeletal, neuro, skin, endocrine and psych systems are negative, except as otherwise noted.      Objective  :      Vitals:  No vitals were obtained today due to virtual visit.    Physical Exam :   GENERAL: alert and no distress  EYES: Eyes grossly normal to inspection.  No discharge or erythema, or obvious scleral/conjunctival abnormalities.  RESP: No audible wheeze, cough, or visible cyanosis.    SKIN: Visible skin clear. No significant rash, abnormal pigmentation or lesions.  NEURO: Cranial nerves grossly intact.  Mentation and speech appropriate for age.  PSYCH: Appropriate affect, tone, and pace of words.     Office Visit on 07/09/2024   Component Date Value Ref Range Status    Human Papilloma Virus 16 DNA 07/09/2024 Negative  Negative Final    Human Papilloma Virus 18 DNA 07/09/2024 Negative  Negative Final    Human Papilloma Virus Other 07/09/2024 Negative  Negative Final    FINAL DIAGNOSIS 07/09/2024    Final                    Value:This result contains rich text formatting which cannot be displayed here.    TSH 07/09/2024 0.68  0.30 - 4.20 uIU/mL Final    Cholesterol 07/09/2024 237 (H)  <200 mg/dL Final    Triglycerides 07/09/2024 58  <150 mg/dL Final    Direct Measure HDL 07/09/2024 63  >=50 mg/dL Final    LDL Cholesterol Calculated 07/09/2024 162 (H)  <=100 mg/dL Final    Non HDL Cholesterol 07/09/2024 174 (H)  <130 mg/dL  Final    Patient Fasting > 8hrs? 07/09/2024 Yes   Final    Sodium 07/09/2024 141  135 - 145 mmol/L Final    Potassium 07/09/2024 4.5  3.4 - 5.3 mmol/L Final    Carbon Dioxide (CO2) 07/09/2024 27  22 - 29 mmol/L Final    Anion Gap 07/09/2024 9  7 - 15 mmol/L Final    Urea Nitrogen 07/09/2024 14.1  6.0 - 20.0 mg/dL Final    Creatinine 07/09/2024 0.77  0.51 - 0.95 mg/dL Final    GFR Estimate 07/09/2024 >90  >60 mL/min/1.73m2 Final    eGFR calculated using 2021 CKD-EPI equation.    Calcium 07/09/2024 9.3  8.6 - 10.0 mg/dL Final    Chloride 07/09/2024 105  98 - 107 mmol/L Final    Glucose 07/09/2024 88  70 - 99 mg/dL Final    Alkaline Phosphatase 07/09/2024 128  40 - 150 U/L Final    AST 07/09/2024 25  0 - 45 U/L Final    Reference intervals for this test were updated on 6/12/2023 to more accurately reflect our healthy population. There may be differences in the flagging of prior results with similar values performed with this method. Interpretation of those prior results can be made in the context of the updated reference intervals.    ALT 07/09/2024 19  0 - 50 U/L Final    Reference intervals for this test were updated on 6/12/2023 to more accurately reflect our healthy population. There may be differences in the flagging of prior results with similar values performed with this method. Interpretation of those prior results can be made in the context of the updated reference intervals.      Protein Total 07/09/2024 7.2  6.4 - 8.3 g/dL Final    Albumin 07/09/2024 4.4  3.5 - 5.2 g/dL Final    Bilirubin Total 07/09/2024 0.4  <=1.2 mg/dL Final    Patient Fasting > 8hrs? 07/09/2024 Yes   Final    WBC Count 07/09/2024 6.1  4.0 - 11.0 10e3/uL Final    RBC Count 07/09/2024 4.92  3.80 - 5.20 10e6/uL Final    Hemoglobin 07/09/2024 14.2  11.7 - 15.7 g/dL Final    Hematocrit 07/09/2024 42.7  35.0 - 47.0 % Final    MCV 07/09/2024 87  78 - 100 fL Final    MCH 07/09/2024 28.9  26.5 - 33.0 pg Final    MCHC 07/09/2024 33.3  31.5 - 36.5  g/dL Final    RDW 07/09/2024 12.6  10.0 - 15.0 % Final    Platelet Count 07/09/2024 319  150 - 450 10e3/uL Final    Interpretation 07/09/2024 Atypical squamous cells of undetermined significance (ASC-US) (A)    Final    Comment 07/09/2024    Final                    Value:This result contains rich text formatting which cannot be displayed here.    Specimen Adequacy 07/09/2024 Satisfactory for evaluation, endocervical/transformation zone component present   Final    Clinical Information 07/09/2024    Final                    Value:This result contains rich text formatting which cannot be displayed here.    LMP/Menopause Date 07/09/2024    Final                    Value:This result contains rich text formatting which cannot be displayed here.    Previous Abnormal? 07/09/2024    Final                    Value:This result contains rich text formatting which cannot be displayed here.    Previous Abnormal Diagnosis 07/09/2024    Final                    Value:This result contains rich text formatting which cannot be displayed here.    Performing Labs 07/09/2024    Final                    Value:This result contains rich text formatting which cannot be displayed here.         Video-Visit Details:     Type of service:  Video Visit   Originating Location (pt. Location): Home    Distant Location (provider location):  On-site  Platform used for Video Visit: Paul  Signed Electronically by: Selena Barnes MD

## 2024-12-09 ENCOUNTER — LAB (OUTPATIENT)
Dept: LAB | Facility: CLINIC | Age: 54
End: 2024-12-09
Payer: COMMERCIAL

## 2024-12-09 DIAGNOSIS — E78.5 HYPERLIPIDEMIA LDL GOAL <130: ICD-10-CM

## 2024-12-09 LAB
ALT SERPL W P-5'-P-CCNC: 14 U/L (ref 0–50)
AST SERPL W P-5'-P-CCNC: 23 U/L (ref 0–45)
CHOLEST SERPL-MCNC: 151 MG/DL
FASTING STATUS PATIENT QL REPORTED: YES
HDLC SERPL-MCNC: 55 MG/DL
LDLC SERPL CALC-MCNC: 82 MG/DL
NONHDLC SERPL-MCNC: 96 MG/DL
TRIGL SERPL-MCNC: 71 MG/DL

## 2024-12-09 PROCEDURE — 80061 LIPID PANEL: CPT

## 2024-12-09 PROCEDURE — 84460 ALANINE AMINO (ALT) (SGPT): CPT

## 2024-12-09 PROCEDURE — 84450 TRANSFERASE (AST) (SGOT): CPT

## 2024-12-09 PROCEDURE — 36415 COLL VENOUS BLD VENIPUNCTURE: CPT

## 2024-12-12 NOTE — RESULT ENCOUNTER NOTE
Dear Beatris,     Thank you for your patience in getting back to you re: your results.      All your recent labs that I ordered are normal, improved, or pretty stable from previous.     Please, continue your current medications and/or supplements and follow up as we discussed at your last visit.     For additional lab test information, [email or web address] is an excellent reference.    Thank you so much for choosing [facility].  Please contact us with any questions that you may have.   We appreciate the opportunity to serve you now and look forward to supporting your healthcare needs for a long time to come!    Most Sincerely,     Selena Barnes MD

## 2025-03-04 DIAGNOSIS — E78.5 HYPERLIPIDEMIA LDL GOAL <130: ICD-10-CM

## 2025-03-04 RX ORDER — ROSUVASTATIN CALCIUM 5 MG/1
5 TABLET, COATED ORAL DAILY
Qty: 90 TABLET | Refills: 1 | Status: SHIPPED | OUTPATIENT
Start: 2025-03-04

## 2025-03-10 ENCOUNTER — VIRTUAL VISIT (OUTPATIENT)
Dept: FAMILY MEDICINE | Facility: CLINIC | Age: 55
End: 2025-03-10
Payer: COMMERCIAL

## 2025-03-10 ENCOUNTER — PATIENT OUTREACH (OUTPATIENT)
Dept: CARE COORDINATION | Facility: CLINIC | Age: 55
End: 2025-03-10

## 2025-03-10 DIAGNOSIS — F41.8 INSOMNIA SECONDARY TO DEPRESSION WITH ANXIETY: Primary | ICD-10-CM

## 2025-03-10 DIAGNOSIS — F33.8 SEASONAL AFFECTIVE DISORDER: ICD-10-CM

## 2025-03-10 DIAGNOSIS — F43.23 ADJUSTMENT DISORDER WITH MIXED ANXIETY AND DEPRESSED MOOD: ICD-10-CM

## 2025-03-10 DIAGNOSIS — F51.05 INSOMNIA SECONDARY TO DEPRESSION WITH ANXIETY: Primary | ICD-10-CM

## 2025-03-10 PROCEDURE — 98006 SYNCH AUDIO-VIDEO EST MOD 30: CPT | Performed by: FAMILY MEDICINE

## 2025-03-10 RX ORDER — SERTRALINE HYDROCHLORIDE 100 MG/1
150 TABLET, FILM COATED ORAL DAILY
Qty: 135 TABLET | Refills: 1 | Status: SHIPPED | OUTPATIENT
Start: 2025-03-10

## 2025-03-10 ASSESSMENT — ANXIETY QUESTIONNAIRES
5. BEING SO RESTLESS THAT IT IS HARD TO SIT STILL: SEVERAL DAYS
7. FEELING AFRAID AS IF SOMETHING AWFUL MIGHT HAPPEN: NOT AT ALL
1. FEELING NERVOUS, ANXIOUS, OR ON EDGE: SEVERAL DAYS
4. TROUBLE RELAXING: SEVERAL DAYS
6. BECOMING EASILY ANNOYED OR IRRITABLE: SEVERAL DAYS
IF YOU CHECKED OFF ANY PROBLEMS ON THIS QUESTIONNAIRE, HOW DIFFICULT HAVE THESE PROBLEMS MADE IT FOR YOU TO DO YOUR WORK, TAKE CARE OF THINGS AT HOME, OR GET ALONG WITH OTHER PEOPLE: SOMEWHAT DIFFICULT
8. IF YOU CHECKED OFF ANY PROBLEMS, HOW DIFFICULT HAVE THESE MADE IT FOR YOU TO DO YOUR WORK, TAKE CARE OF THINGS AT HOME, OR GET ALONG WITH OTHER PEOPLE?: SOMEWHAT DIFFICULT
7. FEELING AFRAID AS IF SOMETHING AWFUL MIGHT HAPPEN: NOT AT ALL
GAD7 TOTAL SCORE: 6
3. WORRYING TOO MUCH ABOUT DIFFERENT THINGS: SEVERAL DAYS
GAD7 TOTAL SCORE: 6
2. NOT BEING ABLE TO STOP OR CONTROL WORRYING: SEVERAL DAYS
GAD7 TOTAL SCORE: 6

## 2025-03-10 NOTE — PATIENT INSTRUCTIONS
" [facility]  [address] [place] [address]  Office: [phone]   Fax:    [phone]     CALM  diane for Smart phones recommended.      RISE - diane a little more expensive.       If you awaken during the night:   Don't look at the clock when you wake up. Don't turn on any lights. Don't look at your phone or any electronics.  If you need to get up to pee, then just pee and go back to bed. Develop a Positive sleep mantra, such as, \"I'm falling asleep now. When my alarm goes off, I'll awaken feeling rested. \"  (Keep it short, sweet and positive) -- repeat until asleep.      Thank you so much or choosing [facility]  for your Health Care. It was a pleasure seeing you at your visit today! Please contact us with any questions or concerns you may have.                   Selena Barnes MD                              To reach your [facility] care team after hours call:   [phone] press #[phone] \"to speak with your care team\".  This will get you to our clinic instead of routing to central [facility]  scheduling.     PLEASE NOTE OUR HOURS HAVE CHANGED secondary to COVID-19 coronavirus pandemic, as we are trying to minimize patient exposure to the virus,  which is now widespread in the [facility].  These hours may change with very little notice.  We apologize for any inconvenience.       Our current clinic hours are:          Monday- Thursday   7:00am - 6:00pm  in person.      Friday  7:00am- 5:00pm                       Saturday and Sunday : Closed to in person and virtual visits        We have telephone and virtual visit times available between    7:00am - 6pm on Monday-Friday as well.                                                Phone:  [phone]      Our pharmacy hours: Monday through Friday 8:00am to 5:00pm                        Saturday - 9:00 am to 12 noon       Sunday : Closed.              Phone:  [phone]      "         ###  Please note: at this time we are not accepting any walk-in visits. ###      There is also information available at our web site:  [email or web address]    If your provider ordered any lab tests and you do not receive the results within 10 business days, please call the clinic.    If you need a medication refill please contact your pharmacy.  Please allow 3 business days for your refill to be completed.    Our clinic offers telephone visits and e visits.  Please ask one of your team members to explain more.      Use CallmyNamehart (secure email communication and access to your chart) to send your primary care provider a message or make an appointment. Ask someone on your Team how to sign up for [employer].

## 2025-03-10 NOTE — PROGRESS NOTES
Beatris is a 54 year old who is being evaluated via a billable video visit.    How would you like to obtain your AVS? MyChart  If the video visit is dropped, the invitation should be resent by: Text to cell phone: [phone]  Will anyone else be joining your video visit? No      Assessment & Plan :       ICD-10-CM    1. Insomnia secondary to depression with anxiety  F51.05     F41.8       2. Adjustment disorder with mixed anxiety and depressed mood- escitalopram 5mg daily = really blunted mood, sertraline 100mg helping-150mg - noted 3/10/2025  F43.23 sertraline (ZOLOFT) 100 MG tablet      3. Seasonal affective disorder  F33.8         Assessment & Plan  Adjustment disorder with mixed anxiety and depressed mood:  - Mood may be affected by seasons. Recent vacation helped, showing possible benefit from more sunlight.  - Increase sertraline to 150 mg daily for a short time to help symptoms. Prescription sent to [facility].    Insomnia secondary to depression with anxiety:  - Trouble sleeping, including difficulty falling asleep and waking during the night.  - Suggestions for better sleep habits, including using the Calm diane and a sleep mantra. Avoid stimulating activities during night awakenings.    Seasonal affective disorder:  - Symptoms might get worse at the start of spring due to negative chemicals building up over winter.  - Consider increasing sertraline further to help with seasonal symptoms.    Return in about 4 months (around 7/10/2025) for Wellness/Preventative Visit, depression, anxiety, cholesterol, w/ Dr. V for 40 min appt.    Future Appointments 3/10/2025 - 9/6/2025        Date Visit Type Length Department Provider     4/11/2025  1:15 PM CIRO 3D SCREENING MAMMOGRAM 15 min  [email or web address]    Location Instructions:     [facility] [address], [address] [place] [address]  Parking [facility] Center customers can park in the lot  "adjacent to the entrance to the [facility] Building.  Entrance and check-in location Enter at the front entrance, under the canopy. Take the stairs or elevator from the main entrance to the 2nd floor. Please check-in for your appointment at the desk in the [facility] waiting area.  This appointment is in a hospital-based location.&nbsp; Before your visit, you may want to check with your insurance company for coverage and referral options, including cost differences between services provided in different clinic settings.&nbsp; For more information visit this link on the [email or web address] Website:&nbsp; [email or web address]              7/10/2025 10:40 AM PREVENTATIVE ADULT 40 min [facility] Selena Barnes MD    Location Instructions:     [facility] is located at [address] [place] [address], along [address] Free parking is available; access the lot by turning north from [address] onto [address], then west onto [address].                         BMI  Estimated body mass index is 26.76 kg/m  as calculated from the following:    Height as of 7/9/24: 1.676 m (5' 6\").    Weight as of 7/9/24: 75.2 kg (165 lb 12.8 oz).   Weight management plan: Discussed healthy diet and exercise guidelines      MEDICATIONS:   Orders Placed This Encounter   Medications    sertraline (ZOLOFT) 100 MG tablet     Sig: Take 1.5 tablets (150 mg) by mouth daily.     Dispense:  135 tablet     Refill:  1          - Continue other medications without change  Work on weight loss  Regular exercise  See Patient Instructions    The longitudinal plan of care for the diagnosis(es)/condition(s) as documented were addressed during this visit. Due to the added complexity in care, I will continue to support Beatris in the subsequent management and with ongoing continuity of care.      \"Consent was obtained from the patient to use an AI scribe/documentation tool in the creation of this " "note.This note/dictation was performed and completed with the assistance of voice recognition software and an AI scribe. It may contain inadvertant transcription  errors,  omissions and/or  inadvertent word substitution.\" --Selena Barnes MD           Subjective   Beatris is a 54 year old, presenting for the following health issues:  1. Insomnia secondary to depression with anxiety    2. Adjustment disorder with mixed anxiety and depressed mood- escitalopram 5mg daily = really blunted mood, sertraline 100mg helping-150mg - noted 3/10/2025    3. Seasonal affective disorder        RECHECK        3/10/2025     3:44 PM   Additional Questions   Roomed by Kami PENNINGTON   Accompanied by self     History of Present Illness       Mental Health Follow-up:  Patient presents to follow-up on Depression & Anxiety.Patient's depression since last visit has been:  Better  The patient is not having other symptoms associated with depression.  Patient's anxiety since last visit has been:  Better  The patient is not having other symptoms associated with anxiety.  Any significant life events: No  Patient is feeling anxious or having panic attacks.  Patient has no concerns about alcohol or drug use.    She eats 2-3 servings of fruits and vegetables daily.She consumes 0 sweetened beverage(s) daily.She exercises with enough effort to increase her heart rate 20 to 29 minutes per day.  She exercises with enough effort to increase her heart rate 5 days per week.   She is taking medications regularly.   Beatris Osorio, age: 54 years    Sleep Disturbances  She has trouble sleeping, waking up around 2:00 to 3:00 AM and sometimes staying awake for an hour. She usually wakes up at 6:30 AM, and if she falls back asleep shortly before then, it affects her energy levels for the whole day. This is a cycle, with only one or two nights a week where she feels well-rested. She also has trouble falling asleep on some nights. Beatris uses ambient white noise " music to help her fall asleep.    Mood Variability  She has mood swings, with good days and bad days. Her mood gets better with sunlight and outdoor activities, as shown by her recent trip to [place], which she found helpful. She thinks some of her mood changes may be seasonal.    Family Stress  She has an unemployed child living at home, which adds to her stress. She finds it hard to motivate her child to be productive during the day.    Medication Concerns  She is taking sertraline and is unsure if the dosage is still working, especially with the possible seasonal part of her mood changes. Some days are better than others, and she is thinking about whether the dosage needs to change.  History of Present Illness-  Beatris Osorio, age: 54 years    Sleep Disturbances  She has trouble sleeping, waking up around 2:00 to 3:00 AM and sometimes staying awake for an hour. She usually wakes up at 6:30 AM, and if she falls back asleep shortly before then, it affects her energy levels for the whole day. This is a cycle, with only one or two nights a week where she feels well-rested. She also has trouble falling asleep on some nights. Beatris uses ambient white noise music to help her fall asleep.    Mood Variability  She has mood swings, with good days and bad days. Her mood gets better with sunlight and outdoor activities, as shown by her recent trip to [place], which she found helpful. She thinks some of her mood changes may be seasonal.    Family Stress  She has an unemployed child living at home, which adds to her stress. She finds it hard to motivate her child to be productive during the day.    Medication Concerns  She is taking sertraline and is unsure if the dosage is still working, especially with the possible seasonal part of her mood changes. Some days are better than others, and she is thinking about whether the dosage needs to change.  Social History     Tobacco Use    Smoking status: Never    Smokeless tobacco:  Never   Vaping Use    Vaping status: Never Used   Substance Use Topics    Alcohol use: Yes     Alcohol/week: 0.0 - 1.7 standard drinks of alcohol     Comment: maybe 0-1 weekly    Drug use: No     Comment: no herbal meds either          7/8/2024    11:20 AM 9/9/2024     4:28 PM 3/10/2025    10:11 AM   PHQ   PHQ-9 Total Score 9 5 9    Q9: Thoughts of better off dead/self-harm past 2 weeks Not at all Not at all Not at all       Patient-reported         7/8/2024    11:22 AM 9/8/2024     5:03 PM 3/10/2025    10:12 AM   CAROL-7 SCORE   Total Score 5 (mild anxiety) 7 (mild anxiety) 6 (mild anxiety)   Total Score 5 7 6        Patient-reported         3/10/2025    10:11 AM   Last PHQ-9   1.  Little interest or pleasure in doing things 1   2.  Feeling down, depressed, or hopeless 1   3.  Trouble falling or staying asleep, or sleeping too much 2   4.  Feeling tired or having little energy 2   5.  Poor appetite or overeating 0   6.  Feeling bad about yourself 0   7.  Trouble concentrating 1   8.  Moving slowly or restless 2   Q9: Thoughts of better off dead/self-harm past 2 weeks 0   PHQ-9 Total Score 9        Patient-reported         3/10/2025    10:12 AM   CAROL-7    1. Feeling nervous, anxious, or on edge 1   2. Not being able to stop or control worrying 1   3. Worrying too much about different things 1   4. Trouble relaxing 1   5. Being so restless that it is hard to sit still 1   6. Becoming easily annoyed or irritable 1   7. Feeling afraid, as if something awful might happen 0   CAROL-7 Total Score 6    If you checked any problems, how difficult have they made it for you to do your work, take care of things at home, or get along with other people? Somewhat difficult       Patient-reported       Patient Active Problem List   Diagnosis    Multiple lipomas    Sebaceous cyst- left upper forehead     Syncope and collapse    Raynaud's phenomenon - noted 10/17/2011 at first visit     Cystocele with uterine descensus- mild w/ both      Cervical pain    Family history of clotting disorder- sister with MTHFR    Dermatitis- every winter - bilateral flanks and hips    MTHFR mutation - heterozygote    Headache    Chronic neck pain    Mild dysplasia of cervix    DDD (degenerative disc disease), cervical    DDD (degenerative disc disease), thoracic    Cervical spinal stenosis    Chronic constipation    Acute left-sided low back pain without sciatica    Asymptomatic postmenopausal status -early menopause-  since 4/2012 - some mood swings, but scant hot flashes , no night sweats     Dry eyes- sometimes increased watering and sometimes a bit painful in the am's    Hyperlipidemia LDL goal <130    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    Trochanteric bursitis of both hips- left >> right     Adjustment disorder with mixed anxiety and depressed mood- escitalopram 5mg daily = really blunted mood, sertraline 25mg helping- desires to increase to 50mg - noted 7/9/2024    Cervical radiculopathy -Cervical spinal stenosis- with some left cervical radiculopathy - worse lying down and turning head to the left with a snapping feeling    Insomnia secondary to depression with anxiety    Seasonal affective disorder     Including med changes made today:   Current Outpatient Medications   Medication Sig Dispense Refill    aspirin 81 MG tablet Take 1 tablet (81 mg) by mouth daily 30 tablet     calcium carbonate-vitamin D (OSCAL) 250-3.125 MG-MCG TABS per tablet Take 1 tablet by mouth 2 times daily      childrens multivitamin w/iron (FLINTSTONES COMPLETE) chewable tablet Take 2 tablets by mouth daily. 180 tablet 3    rosuvastatin (CRESTOR) 5 MG tablet TAKE 1 TABLET(5 MG) BY MOUTH DAILY 90 tablet 1    sertraline (ZOLOFT) 100 MG tablet Take 1.5 tablets (150 mg) by mouth daily. 135 tablet 1    cyclobenzaprine (FLEXERIL) 5 MG tablet Take 1-2 tablets (5-10 mg) by mouth 3 times daily as needed for muscle spasms 30 tablet 1    triamcinolone (KENALOG) 0.1 %  cream Apply topically 3 times daily as needed Apply sparingly to affected area. 80 g 5        No Known Allergies      Review of Systems  Constitutional, HEENT, cardiovascular, pulmonary, GI, , musculoskeletal, neuro, skin, endocrine and psych systems are negative, except as otherwise noted.      Objective           Vitals:  No vitals were obtained today due to virtual visit.    Physical Exam   GENERAL: alert and no distress  EYES: Eyes grossly normal to inspection.  No discharge or erythema, or obvious scleral/conjunctival abnormalities.  RESP: No audible wheeze, cough, or visible cyanosis.    SKIN: Visible skin clear. No significant rash, abnormal pigmentation or lesions.  NEURO: Cranial nerves grossly intact.  Mentation and speech appropriate for age.  PSYCH: Appropriate affect, tone, and pace of words, Alert and oriented. No acute distress. Appears well-groomed and casually dressed. Affect is normal, not particularly depressed. In good humor and laughs appropriately. Not particularly anxious. No evidence of psychosis.     Lab on 12/09/2024   Component Date Value Ref Range Status    Cholesterol 12/09/2024 151  <200 mg/dL Final    Triglycerides 12/09/2024 71  <150 mg/dL Final    Direct Measure HDL 12/09/2024 55  >=50 mg/dL Final    LDL Cholesterol Calculated 12/09/2024 82  <100 mg/dL Final    Non HDL Cholesterol 12/09/2024 96  <130 mg/dL Final    Patient Fasting > 8hrs? 12/09/2024 Yes   Final    ALT 12/09/2024 14  0 - 50 U/L Final    AST 12/09/2024 23  0 - 45 U/L Final         Video-Visit Details:    Type of service:  Video Visit   Originating Location (pt. Location): Home  Joined the call at 3/10/2025, 4:11:08 pm.  Left the call at 3/10/2025, 4:27:22 pm.  You were on the call for 16 minutes 13 seconds .  Distant Location (provider location):  On-site  Platform used for Video Visit: Paul  Signed Electronically by: Selena Barnes MD

## 2025-04-11 ENCOUNTER — HOSPITAL ENCOUNTER (OUTPATIENT)
Dept: MAMMOGRAPHY | Facility: CLINIC | Age: 55
Discharge: HOME | End: 2025-04-11
Attending: FAMILY MEDICINE | Admitting: FAMILY MEDICINE
Payer: COMMERCIAL

## 2025-04-11 DIAGNOSIS — Z12.31 VISIT FOR SCREENING MAMMOGRAM: ICD-10-CM

## 2025-04-11 PROCEDURE — 77067 SCR MAMMO BI INCL CAD: CPT

## 2025-04-11 PROCEDURE — 77063 BREAST TOMOSYNTHESIS BI: CPT

## 2025-06-19 ENCOUNTER — PATIENT OUTREACH (OUTPATIENT)
Dept: FAMILY MEDICINE | Facility: CLINIC | Age: 55
End: 2025-06-19
Payer: COMMERCIAL

## 2025-06-19 DIAGNOSIS — N87.0 MILD DYSPLASIA OF CERVIX: Primary | ICD-10-CM

## 2025-06-26 ENCOUNTER — PATIENT OUTREACH (OUTPATIENT)
Dept: CARE COORDINATION | Facility: CLINIC | Age: 55
End: 2025-06-26
Payer: COMMERCIAL

## 2025-07-05 SDOH — HEALTH STABILITY: PHYSICAL HEALTH: ON AVERAGE, HOW MANY MINUTES DO YOU ENGAGE IN EXERCISE AT THIS LEVEL?: 50 MIN

## 2025-07-05 SDOH — HEALTH STABILITY: PHYSICAL HEALTH: ON AVERAGE, HOW MANY DAYS PER WEEK DO YOU ENGAGE IN MODERATE TO STRENUOUS EXERCISE (LIKE A BRISK WALK)?: 6 DAYS

## 2025-07-05 ASSESSMENT — ANXIETY QUESTIONNAIRES
1. FEELING NERVOUS, ANXIOUS, OR ON EDGE: SEVERAL DAYS
4. TROUBLE RELAXING: NOT AT ALL
5. BEING SO RESTLESS THAT IT IS HARD TO SIT STILL: NOT AT ALL
7. FEELING AFRAID AS IF SOMETHING AWFUL MIGHT HAPPEN: NOT AT ALL
8. IF YOU CHECKED OFF ANY PROBLEMS, HOW DIFFICULT HAVE THESE MADE IT FOR YOU TO DO YOUR WORK, TAKE CARE OF THINGS AT HOME, OR GET ALONG WITH OTHER PEOPLE?: NOT DIFFICULT AT ALL
IF YOU CHECKED OFF ANY PROBLEMS ON THIS QUESTIONNAIRE, HOW DIFFICULT HAVE THESE PROBLEMS MADE IT FOR YOU TO DO YOUR WORK, TAKE CARE OF THINGS AT HOME, OR GET ALONG WITH OTHER PEOPLE: NOT DIFFICULT AT ALL
6. BECOMING EASILY ANNOYED OR IRRITABLE: SEVERAL DAYS
GAD7 TOTAL SCORE: 3
2. NOT BEING ABLE TO STOP OR CONTROL WORRYING: NOT AT ALL
3. WORRYING TOO MUCH ABOUT DIFFERENT THINGS: SEVERAL DAYS
7. FEELING AFRAID AS IF SOMETHING AWFUL MIGHT HAPPEN: NOT AT ALL

## 2025-07-05 ASSESSMENT — SOCIAL DETERMINANTS OF HEALTH (SDOH): HOW OFTEN DO YOU GET TOGETHER WITH FRIENDS OR RELATIVES?: ONCE A WEEK

## 2025-07-05 ASSESSMENT — PATIENT HEALTH QUESTIONNAIRE - PHQ9
10. IF YOU CHECKED OFF ANY PROBLEMS, HOW DIFFICULT HAVE THESE PROBLEMS MADE IT FOR YOU TO DO YOUR WORK, TAKE CARE OF THINGS AT HOME, OR GET ALONG WITH OTHER PEOPLE: NOT DIFFICULT AT ALL
SUM OF ALL RESPONSES TO PHQ QUESTIONS 1-9: 11
SUM OF ALL RESPONSES TO PHQ QUESTIONS 1-9: 11

## 2025-07-10 ENCOUNTER — OFFICE VISIT (OUTPATIENT)
Dept: FAMILY MEDICINE | Facility: CLINIC | Age: 55
End: 2025-07-10
Payer: COMMERCIAL

## 2025-07-10 ENCOUNTER — PATIENT OUTREACH (OUTPATIENT)
Dept: ONCOLOGY | Facility: CLINIC | Age: 55
End: 2025-07-10

## 2025-07-10 VITALS
SYSTOLIC BLOOD PRESSURE: 110 MMHG | OXYGEN SATURATION: 99 % | TEMPERATURE: 97.9 F | RESPIRATION RATE: 14 BRPM | HEART RATE: 109 BPM | DIASTOLIC BLOOD PRESSURE: 66 MMHG | WEIGHT: 140.8 LBS | HEIGHT: 66 IN | BODY MASS INDEX: 22.63 KG/M2

## 2025-07-10 DIAGNOSIS — Z80.41 FAMILY HISTORY OF MALIGNANT NEOPLASM OF OVARY: ICD-10-CM

## 2025-07-10 DIAGNOSIS — M50.30 DDD (DEGENERATIVE DISC DISEASE), CERVICAL: ICD-10-CM

## 2025-07-10 DIAGNOSIS — Z12.4 CERVICAL CANCER SCREENING: ICD-10-CM

## 2025-07-10 DIAGNOSIS — Z80.0 FAMILY HISTORY OF COLON CANCER: ICD-10-CM

## 2025-07-10 DIAGNOSIS — F43.23 ADJUSTMENT DISORDER WITH MIXED ANXIETY AND DEPRESSED MOOD: ICD-10-CM

## 2025-07-10 DIAGNOSIS — Z23 NEED FOR HEPATITIS B VACCINATION: ICD-10-CM

## 2025-07-10 DIAGNOSIS — N95.1 SYMPTOMATIC MENOPAUSAL OR FEMALE CLIMACTERIC STATES: ICD-10-CM

## 2025-07-10 DIAGNOSIS — E78.5 HYPERLIPIDEMIA LDL GOAL <130: ICD-10-CM

## 2025-07-10 DIAGNOSIS — M54.12 CERVICAL RADICULOPATHY: ICD-10-CM

## 2025-07-10 DIAGNOSIS — Z80.3 FAMILY HISTORY OF BREAST CANCER: ICD-10-CM

## 2025-07-10 DIAGNOSIS — M54.50 ACUTE LEFT-SIDED LOW BACK PAIN WITHOUT SCIATICA: ICD-10-CM

## 2025-07-10 DIAGNOSIS — F33.1 MODERATE RECURRENT MAJOR DEPRESSION (H): ICD-10-CM

## 2025-07-10 DIAGNOSIS — Z15.89 MTHFR MUTATION: ICD-10-CM

## 2025-07-10 DIAGNOSIS — Z00.00 ROUTINE GENERAL MEDICAL EXAMINATION AT A HEALTH CARE FACILITY: Primary | ICD-10-CM

## 2025-07-10 DIAGNOSIS — N89.8 VAGINAL DISCHARGE: ICD-10-CM

## 2025-07-10 LAB
CLUE CELLS: ABNORMAL
TRICHOMONAS, WET PREP: ABNORMAL
WBC'S/HIGH POWER FIELD, WET PREP: ABNORMAL
YEAST, WET PREP: ABNORMAL

## 2025-07-10 RX ORDER — SERTRALINE HYDROCHLORIDE 100 MG/1
150 TABLET, FILM COATED ORAL DAILY
Qty: 135 TABLET | Refills: 1 | Status: SHIPPED | OUTPATIENT
Start: 2025-07-10

## 2025-07-10 RX ORDER — ROSUVASTATIN CALCIUM 5 MG/1
5 TABLET, COATED ORAL DAILY
Qty: 90 TABLET | Refills: 3 | Status: SHIPPED | OUTPATIENT
Start: 2025-07-10

## 2025-07-10 RX ORDER — CYCLOBENZAPRINE HCL 5 MG
5-10 TABLET ORAL 3 TIMES DAILY PRN
Qty: 30 TABLET | Refills: 5 | Status: SHIPPED | OUTPATIENT
Start: 2025-07-10

## 2025-07-10 NOTE — PROGRESS NOTES
Preventive Care Visit  [facility] [place]  Selena Barnes MD, Family Medicine  Jul 10, 2025      Assessment & Plan :       ICD-10-CM    1. Routine general medical examination at a health care facility  Z00.00 PRIMARY CARE FOLLOW-UP SCHEDULING      2. Cervical cancer screening  Z12.4 HPV and Gynecologic Cytology Panel - Recommended Age 30 - 65 Years      3. DDD (degenerative disc disease), cervical  M50.30       4. Hyperlipidemia LDL goal <130  E78.5 rosuvastatin (CRESTOR) 5 MG tablet     PRIMARY CARE FOLLOW-UP SCHEDULING     Lipid panel reflex to direct LDL Fasting     Comprehensive metabolic panel     CBC with platelets     Albumin Random Urine Quantitative with Creat Ratio      5. MTHFR mutation - heterozygote  Z15.89       6. Cervical radiculopathy -Cervical spinal stenosis- with some left cervical radiculopathy - worse lying down and turning head to the left with a snapping feeling  M54.12       7. Acute left-sided low back pain without sciatica- intermittent recurrences - not right now, but desires cyclobenzaprine rx on hold   M54.50 cyclobenzaprine (FLEXERIL) 5 MG tablet      8. Adjustment disorder with mixed anxiety and depressed mood- escitalopram 5mg daily = really blunted mood, sertraline 100mg helping-150mg - noted 3/10/2025  F43.23 sertraline (ZOLOFT) 100 MG tablet     TSH with free T4 reflex      9. Vaginal discharge  N89.8 Wet prep - Clinic Collect      10. Symptomatic menopausal or female climacteric states  N95.1 DX Bone Density      11. Family history of breast cancer  Z80.3 Adult Oncology/Hematology  Referral      12. Family history of malignant neoplasm of ovary  Z80.41 Adult Oncology/Hematology  Referral      13. Family history of colon cancer  Z80.0 Adult Oncology/Hematology  Referral      14. Moderate recurrent major depression (H)  F33.1 TSH with free T4 reflex      15. Need for hepatitis B vaccination  Z23 HEPATITIS B, ADULT 20+  (ENGERIX-B/RECOMBIVAX HB)          Patient has been advised of split billing requirements and indicates understanding: Yes    Depression Screening Follow Up        7/5/2025    11:56 AM   PHQ   PHQ-9 Total Score 11    Q9: Thoughts of better off dead/self-harm past 2 weeks Not at all       Patient-reported         7/5/2025    11:56 AM   Last PHQ-9   1.  Little interest or pleasure in doing things 1   2.  Feeling down, depressed, or hopeless 2   3.  Trouble falling or staying asleep, or sleeping too much 3   4.  Feeling tired or having little energy 2   5.  Poor appetite or overeating 1   6.  Feeling bad about yourself 0   7.  Trouble concentrating 1   8.  Moving slowly or restless 1   Q9: Thoughts of better off dead/self-harm past 2 weeks 0   PHQ-9 Total Score 11        Patient-reported         Follow Up Actions Taken  Patient counseled, no additional follow up at this time.  Continue same anti-depressant medication dosage.    Pt thinking about HRT. See AVS and will discuss at follow up video visit on 7/30/2025.     Counseling:   Appropriate preventive services were addressed with this patient via screening, questionnaire, or discussion as appropriate for fall prevention, nutrition, physical activity, Tobacco-use cessation, social engagement, weight loss and cognition.  Checklist reviewing preventive services available has been given to the patient.  Reviewed patient's diet, addressing concerns and/or questions.   She is at risk for psychosocial distress and has been provided with information to reduce risk.   The patient's PHQ-9 score is consistent with moderate depression. She was provided with information regarding depression.   Reviewed preventive health counseling, as reflected in patient instructions    Assessment & Plan  Cervical cancer screening:  She had ASCUS three years ago. Pap smear done today to check for any changes.    degenerative disc disease, cervical:  She has neck pain and cracking, sometimes with  "nausea. If she gets more numbness, tingling, or weakness, especially in her arm, she should see a spine specialist. She uses cyclobenzaprine at night for severe pain, which helps.    hyperlipidemia LDL goal <130:  She takes rosuvastatin 5 mg daily. Next refill is due in September.    MTHFR mutation:  She has an MTHFR mutation. This does not stop her from using hormone replacement therapy if she chooses.    cervical radiculopathy:  She has numbness and tingling in her left arm with certain positions.    acute left-sided low back pain without sciatica:  She uses cyclobenzaprine for pain when needed.    adjustment disorder with mixed anxiety and depressed mood:  She has anxiety and depression. Her sertraline dose was increased to 1.5 tablets daily, which has helped her mood.    routine general medical examination at a health care facility:  Routine checkup done. She got her hepatitis B vaccine today. Shingles vaccine is recommended, and a premedication plan was discussed to help with side effects. Pneumonia vaccine was discussed but she chose to wait. Side effects of shingles vaccine and ways to help prevent them were discussed.    vaginal discharge:  She has a little vaginal discharge, likely from hormone changes. Cervical atrophy was seen. A sample was taken to check for yeast or bacteria.    Follow-up    Follow-up Visit   Expected date:  Jul 10, 2026 (Approximate)      Follow Up Appointment Details:     Follow-up with whom?: PCP    Follow-Up for what?: Adult Preventive    How?: In Person               Return in about 1 year (around 7/10/2026) for Wellness/Preventative Visit, w/ Dr. DRUMMOND for 40 min appt.   The longitudinal plan of care for the diagnosis(es)/condition(s) as documented were addressed during this visit. Due to the added complexity in care, I will continue to support Beatris in the subsequent management and with ongoing continuity of care.    \"Consent was obtained from the patient to use an AI " "scribe/documentation tool in the creation of this note.This note/dictation was performed and completed with the assistance of voice recognition software and an AI scribe. It may contain inadvertant transcription  errors,  omissions and/or  inadvertent word substitution.\" --Selena Barnes MD           Aretha Spear is a 55 year old, presenting for the following:  Physical  and the following other medical problems:      1. Routine general medical examination at a health care facility    2. Cervical cancer screening    3. DDD (degenerative disc disease), cervical    4. Hyperlipidemia LDL goal <130    5. MTHFR mutation - heterozygote    6. Cervical radiculopathy -Cervical spinal stenosis- with some left cervical radiculopathy - worse lying down and turning head to the left with a snapping feeling    7. Acute left-sided low back pain without sciatica- intermittent recurrences - not right now, but desires cyclobenzaprine rx on hold     8. Adjustment disorder with mixed anxiety and depressed mood- escitalopram 5mg daily = really blunted mood, sertraline 100mg helping-150mg - noted 3/10/2025    9. Vaginal discharge    10. Symptomatic menopausal or female climacteric states            7/10/2025    10:21 AM   Additional Questions   Roomed by Liss PENNINGTON   Accompanied by Self          HPI   History of Present Illness-  Chronic neck pain and degenerative disc disease  She has chronic neck pain from degenerative discs. Sometimes the pain gets so bad it makes her feel nauseated. Walking or driving can make it worse, especially when her neck is jostled. She feels a cracking sensation in her neck, sometimes with pain or resistance to movement. She has numbness, tingling, or weakness down her left arm with certain positions, especially with overhead activities or triceps exercises, so she avoids some triceps exercises. She takes cyclobenzaprine at night when the pain is severe, which helps her symptoms. She is careful about " her activities and tries not to overstretch, especially during yoga.    Abnormal Pap history  She had an abnormal Pap smear about three years ago (ASCUS). No other abnormal Pap smears in the last five years.    Mood symptoms  She has a history of mood symptoms. Her sertraline dose was recently increased to 1.5 tablets daily. She feels her mood has improved but is not quite where she wants it. She has had more stress and poor sleep lately because her dog has been very sick.    Vaginal discharge  She has noticed a little vaginal discharge recently.    Menopausal symptoms and history  She went through menopause early, at age 41. She has hot flashes that are tolerable, very little vaginal moisture, and trouble sleeping (falling and staying asleep).    Vaccine reactions  She tends to have strong side effects from vaccines, especially viral vaccines like COVID and shingles. She had a strong reaction to her first shingles vaccine.    Heartburn  She has had heartburn, which went away after one day of taking omeprazole, with only one more episode that month.    Family events  She is about to become a grandmother. Her son and daughter-in-law are expecting a baby due July 25, 2025.    Misc  Her  made her breakfast for her birthday (July 10, 2026). Her 13.5-year-old English pointer dog recently got very sick, which has caused her stress and sleep problems.      Hyperlipidemia Follow-Up:     Are you regularly taking any medication or supplement to lower your cholesterol?   Yes- Rosuvastatin 5 mg  Are you having muscle aches or other side effects that you think could be caused by your cholesterol lowering medication?  No    Depression and Anxiety   How are you doing with your depression since your last visit? Improved   How are you doing with your anxiety since your last visit?  No change  Are you having other symptoms that might be associated with depression or anxiety? No  Have you had a significant life event? No   Do  you have any concerns with your use of alcohol or other drugs? No    Social History     Tobacco Use    Smoking status: Never    Smokeless tobacco: Never   Vaping Use    Vaping status: Never Used   Substance Use Topics    Alcohol use: Yes     Alcohol/week: 0.0 - 1.7 standard drinks of alcohol     Comment: maybe 0-1 weekly    Drug use: No     Comment: no herbal meds either          9/9/2024     4:28 PM 3/10/2025    10:11 AM 7/5/2025    11:56 AM   PHQ   PHQ-9 Total Score 5 9  11    Q9: Thoughts of better off dead/self-harm past 2 weeks Not at all Not at all Not at all       Patient-reported         9/8/2024     5:03 PM 3/10/2025    10:12 AM 7/5/2025    11:57 AM   CAROL-7 SCORE   Total Score 7 (mild anxiety) 6 (mild anxiety) 3 (minimal anxiety)   Total Score 7 6  3        Patient-reported         7/5/2025    11:56 AM   Last PHQ-9   1.  Little interest or pleasure in doing things 1   2.  Feeling down, depressed, or hopeless 2   3.  Trouble falling or staying asleep, or sleeping too much 3   4.  Feeling tired or having little energy 2   5.  Poor appetite or overeating 1   6.  Feeling bad about yourself 0   7.  Trouble concentrating 1   8.  Moving slowly or restless 1   Q9: Thoughts of better off dead/self-harm past 2 weeks 0   PHQ-9 Total Score 11        Patient-reported         7/5/2025    11:57 AM   CAROL-7    1. Feeling nervous, anxious, or on edge 1   2. Not being able to stop or control worrying 0   3. Worrying too much about different things 1   4. Trouble relaxing 0   5. Being so restless that it is hard to sit still 0   6. Becoming easily annoyed or irritable 1   7. Feeling afraid, as if something awful might happen 0   CAROL-7 Total Score 3    If you checked any problems, how difficult have they made it for you to do your work, take care of things at home, or get along with other people? Not difficult at all       Patient-reported       Suicide Assessment Five-step Evaluation and Treatment (SAFE-T)      Advance Care  Planning    Discussed advance care planning with patient; however, patient declined at this time.        7/5/2025   General Health   How would you rate your overall physical health? Good   Feel stress (tense, anxious, or unable to sleep) To some extent   (!) STRESS CONCERN      7/5/2025   Nutrition   Three or more servings of calcium each day? (!) NO   Diet: Regular (no restrictions)   How many servings of fruit and vegetables per day? (!) 2-3   How many sweetened beverages each day? 0-1         7/5/2025   Exercise   Days per week of moderate/strenous exercise 6 days   Average minutes spent exercising at this level 50 min         7/5/2025   Social Factors   Frequency of gathering with friends or relatives Once a week   Worry food won't last until get money to buy more No   Food not last or not have enough money for food? No   Do you have housing? (Housing is defined as stable permanent housing and does not include staying outside in a car, in a tent, in an abandoned building, in an overnight shelter, or couch-surfing.) Yes   Are you worried about losing your housing? No   Lack of transportation? No   Unable to get utilities (heat,electricity)? No         7/5/2025   Fall Risk   Fallen 2 or more times in the past year? No   Trouble with walking or balance? No          7/5/2025   Dental   Dentist two times every year? Yes       Today's PHQ-9 Score:       7/5/2025    11:56 AM   PHQ-9 SCORE   PHQ-9 Total Score MyChart 11 (Moderate depression)   PHQ-9 Total Score 11        Patient-reported         7/5/2025   Substance Use   Alcohol more than 3/day or more than 7/wk No   Do you use any other substances recreationally? No     Social History     Tobacco Use    Smoking status: Never    Smokeless tobacco: Never   Vaping Use    Vaping status: Never Used   Substance Use Topics    Alcohol use: Yes     Alcohol/week: 0.0 - 1.7 standard drinks of alcohol     Comment: maybe 0-1 weekly    Drug use: No     Comment: no herbal meds  either            4/11/2025   LAST FHS-7 RESULTS   1st degree relative breast or ovarian cancer Yes   Any relative bilateral breast cancer No   Any male have breast cancer No   Any ONE woman have BOTH breast AND ovarian cancer No   Any woman with breast cancer before 50yrs Yes   2 or more relatives with breast AND/OR ovarian cancer No   2 or more relatives with breast AND/OR bowel cancer Yes   Referred for genetic cancer counseling last year.  Thinking about it again.     Mammogram Screening - Mammogram every 1-2 years updated in Health Maintenance based on mutual decision making        7/5/2025   STI Screening   New sexual partner(s) since last STI/HIV test? No     History of abnormal Pap smear: YES - reflected in Problem List and Health Maintenance accordingly        Latest Ref Rng & Units 7/9/2024    10:28 AM 6/2/2023     2:10 PM 3/2/2022     3:43 PM   PAP / HPV   PAP  Atypical squamous cells of undetermined significance (ASC-US)  Atypical squamous cells of undetermined significance (ASC-US)  Negative for Intraepithelial Lesion or Malignancy (NILM)    HPV 16 DNA Negative Negative  Negative  Negative    HPV 18 DNA Negative Negative  Negative  Negative    Other HR HPV Negative Negative  Negative  Negative      ASCVD Risk   The 10-year ASCVD risk score (Haydee SOLANO, et al., 2019) is: 1.2%    Values used to calculate the score:      Age: 55 years      Sex: Female      Is Non- : No      Diabetic: No      Tobacco smoker: No      Systolic Blood Pressure: 110 mmHg      Is BP treated: No      HDL Cholesterol: 55 mg/dL      Total Cholesterol: 151 mg/dL    DEXA ordered and is pending.     Reviewed and updated as needed this visit by Provider                    Past Medical History:   Diagnosis Date    Adjustment disorder with mixed anxiety and depressed mood     escitalopram 5mg daily - really blunted mood; paroxetine: nausea    Arthritis 2010    osteoarthritis...    ASCUS favor benign 2011,      neg HPV Plan cotest in 3 yrs.    Cellulitis of right leg w/ abrasion 2012    Cervicalgia     Headache     Left ankle injury 2012    MTHFR mutation     heterozygote - referred to hematology    Pap smear cannot exclude high grade squamous intraepithelial lesion (ASC-H) 03/10/2017    03/10/17: ASC-H pap, Neg HR HPV result.     Puncture wound of left lower leg with complication- cellulitis 2012    Vertigo summer 2006    rehydrated over night in hosp = better      Past Surgical History:   Procedure Laterality Date    BIOPSY  1995; 2017    needle biopsy of mass in left breast;shave biopsy of scalp    COLONOSCOPY  2016    Dr. Diaz [facility]    COLONOSCOPY N/A 2016    Procedure: COLONOSCOPY;  Surgeon: Oscar Diaz MD;  Location: [facility]    EXCISE MASS HEAD  2012    Procedure: EXCISE MASS HEAD;  Excision Left Forehead Mass, Excision Right Posterior Thigh Mass, Excision Left Posterior Thigh Mass ;  Surgeon: Felix Trivedi MD;  Location: [facility]    EXCISE MASS LOWER EXTREMITY  2012    Procedure: EXCISE MASS LOWER EXTREMITY;;  Surgeon: Felix Trivedi MD;  Location: [facility]    wisdom teeth extraction       OB History    Para Term  AB Living   4 4 4 0 0 4   SAB IAB Ectopic Multiple Live Births   0 0 0 0 0      # Outcome Date GA Lbr Clement/2nd Weight Sex Type Anes PTL Lv   4 Term            3 Term            2 Term            1 Term               Obstetric Comments    x 4 - no complications with any pregnancies , labors or deliveries     Lab work is in process  Labs reviewed in EPIC  BP Readings from Last 3 Encounters:   07/10/25 110/66   24 120/80   24 127/78    Wt Readings from Last 3 Encounters:   07/10/25 63.9 kg (140 lb 12.8 oz)   24 75.2 kg (165 lb 12.8 oz)   02/15/24 72.6 kg (160 lb)                  Patient Active Problem List   Diagnosis    Multiple lipomas    Sebaceous cyst- left upper forehead     Syncope and collapse     Raynaud's phenomenon - noted 10/17/2011 at first visit     Cystocele with uterine descensus- mild w/ both     Cervical pain    Family history of clotting disorder- sister with MTHFR    Dermatitis- every winter - bilateral flanks and hips    MTHFR mutation - heterozygote    Headache    Chronic neck pain    Mild dysplasia of cervix    DDD (degenerative disc disease), cervical    DDD (degenerative disc disease), thoracic    Cervical spinal stenosis    Chronic constipation    Acute left-sided low back pain without sciatica    Asymptomatic postmenopausal status -early menopause-  since 4/2012 - some mood swings, but scant hot flashes , no night sweats     Dry eyes- sometimes increased watering and sometimes a bit painful in the am's    Hyperlipidemia LDL goal <130    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    Trochanteric bursitis of both hips- left >> right     Adjustment disorder with mixed anxiety and depressed mood- escitalopram 5mg daily = really blunted mood, sertraline 25mg helping- desires to increase to 50mg - noted 7/9/2024    Cervical radiculopathy -Cervical spinal stenosis- with some left cervical radiculopathy - worse lying down and turning head to the left with a snapping feeling    Insomnia secondary to depression with anxiety    Seasonal affective disorder     Past Surgical History:   Procedure Laterality Date    BIOPSY  8/1995; 11/2017    needle biopsy of mass in left breast;shave biopsy of scalp    COLONOSCOPY  2/11/2016    Dr. Diaz [facility]    COLONOSCOPY N/A 2/11/2016    Procedure: COLONOSCOPY;  Surgeon: Oscar Diaz MD;  Location: [facility]    EXCISE MASS HEAD  12/11/2012    Procedure: EXCISE MASS HEAD;  Excision Left Forehead Mass, Excision Right Posterior Thigh Mass, Excision Left Posterior Thigh Mass ;  Surgeon: Felix Trivedi MD;  Location: [facility]    EXCISE MASS LOWER EXTREMITY  12/11/2012    Procedure: EXCISE MASS LOWER EXTREMITY;;  Surgeon: Felix Trivedi  MD RAISSA;  Location: [facility]    wisdom teeth extraction         Social History     Tobacco Use    Smoking status: Never    Smokeless tobacco: Never   Substance Use Topics    Alcohol use: Yes     Alcohol/week: 0.0 - 1.7 standard drinks of alcohol     Comment: maybe 0-1 weekly     Family History   Problem Relation Age of Onset    Hypertension Mother     Neuromuscular Disease Mother         can't walk regularly anymore at age 86    Asthma Father     Hypertension Father     Cerebrovascular Disease Father     Prostate Cancer Father     Neurologic Disorder Father         Lewy Body dementia - broke hip-  at age 76     Cerebrovascular Disease Sister 46        secondary to MTHFR deficiency - homozygous    Breast Cancer Sister 46        dx'd in  just had genetic testing done 2019 - she's heterozygous MTHFR    Depression Sister     Thyroid Disease Sister     Endometrial Cancer Sister 51        Endometrioid adenocarcinoma grade 1- secondary to tamoxifen    Other - See Comments Sister         homoozygous MTHFR    Colon Cancer Other         Maternal aunt    Ovarian Cancer Maternal Cousin 30 - 39         Current Outpatient Medications   Medication Sig Dispense Refill    aspirin 81 MG tablet Take 1 tablet (81 mg) by mouth daily 30 tablet     calcium carbonate-vitamin D (OSCAL) 250-3.125 MG-MCG TABS per tablet Take 1 tablet by mouth 2 times daily      childrens multivitamin w/iron (FLINTSTONES COMPLETE) chewable tablet Take 2 tablets by mouth daily. 180 tablet 3    cyclobenzaprine (FLEXERIL) 5 MG tablet Take 1-2 tablets (5-10 mg) by mouth 3 times daily as needed for muscle spasms. 30 tablet 5    rosuvastatin (CRESTOR) 5 MG tablet Take 1 tablet (5 mg) by mouth daily. 90 tablet 3    sertraline (ZOLOFT) 100 MG tablet Take 1.5 tablets (150 mg) by mouth daily. 135 tablet 1    triamcinolone (KENALOG) 0.1 % cream Apply topically 3 times daily as needed Apply sparingly to affected area. 80 g 5     No Known Allergies  Recent Labs   Lab  "Test 12/09/24  0756 07/09/24  1059 06/30/23  0722 03/02/22  1617 12/29/20  1012 07/03/19  0828   LDL 82 162* 149*   < > 171* 138*   HDL 55 63 55   < > 63 58   TRIG 71 58 70   < > 95 50   ALT 14 19 18   < > 27 20   CR  --  0.77 0.81   < > 0.70 0.72   GFRESTIMATED  --  >90 86   < > >90 >90   GFRESTBLACK  --   --   --   --  >90 >90   POTASSIUM  --  4.5 5.0   < > 4.3 4.2   TSH  --  0.68 0.95   < > 1.11 1.25    < > = values in this interval not displayed.           Review of Systems  Constitutional, HEENT, cardiovascular, pulmonary, GI, , musculoskeletal, neuro, skin, endocrine and psych systems are negative, except as otherwise noted.     Objective    Exam  /66   Pulse 109   Temp 97.9  F (36.6  C) (Tympanic)   Resp 14   Ht 1.676 m (5' 6\")   Wt 63.9 kg (140 lb 12.8 oz)   LMP 04/01/2015   SpO2 99%   BMI 22.73 kg/m     Estimated body mass index is 22.73 kg/m  as calculated from the following:    Height as of this encounter: 1.676 m (5' 6\").    Weight as of this encounter: 63.9 kg (140 lb 12.8 oz).    Physical Exam:   GENERAL: alert and no distress.   EYES: Eyes grossly normal to inspection, PERRL and conjunctivae and sclerae normal  HENT: ear canals and TM's normal, nose and mouth without ulcers or lesions  NECK: no adenopathy, no asymmetry, masses, or scars  RESP: lungs clear to auscultation - no rales, rhonchi or wheezes  BREAST: normal without masses, tenderness or nipple discharge and no palpable axillary masses or adenopathy  CV: regular rate and rhythm, normal S1 S2, no S3 or S4, no murmur, click or rub, no peripheral edema  ABDOMEN: soft, nontender, no hepatosplenomegaly, no masses and bowel sounds normal   (female) w/bimanual: normal female external genitalia, normal urethral meatus, normal vaginal mucosa, and normal cervix/adnexa/uterus without masses , but there is a scant yellow-greenish nonodorus vaginal  discharge noted. Collected for wet prep and is pending.   MS: no gross musculoskeletal " defects noted, no edema  SKIN: no suspicious lesions or rashes  NEURO: Normal strength and tone, mentation intact and speech normal  PSYCH: mentation appears normal, affect normal/bright    Signed Electronically by: Selena Barnes MD

## 2025-07-10 NOTE — PROGRESS NOTES
New Patient Oncology Nurse Navigator Note     Referring provider:   Selena Barnes MD [facility] [phone]     Referred to (specialty):  Genetic Counseling    Date Referral Received: 7/10/2025     Evaluation for:   Family history of breast cancer.   Family history of malignant neoplasm of ovary.   Family history of colon cancer.

## 2025-07-10 NOTE — PATIENT INSTRUCTIONS
[facility]  [address] [place] [id number]  Office: [phone]   Fax:    [phone]     To prevent and/or treat mild side effects (body aches, chills, fever, fatigue) -not related to allergies - after receiving COVID-19 novel coronavirus vaccine or other vaccines :     Go into your shots very well hydrated and stay well hydrated for 3-4 days afterwards. The reactions to shots are both histamine mediated and inflammatory mediated, so we can mitigate those side effects with mild anti-inflammatories and non-sedating anti-histamines, such as claritin or allegra.     Don't take Ibuprofen prior to your shot, but rather just afterward.  No prescription steroids within 2 weeks of the shots as they can suppress your immune system, but Ibuprofen is not a significant immunosuppressant at the 400mg dosage.   Ok to take tylenol 2-325 or 2-500mg tabs prior to vaccine.    Take claritin (a non-sedating anti-histamine) 10 mg daily or allegra 180mg daily for 3 days and 2 over the counter 200mg  Ibuprofen pills every 4-6 hours with food while awake for the next 2-3 days .  Take them with food so they don't irritate your stomach.  You can do the above regimen for 2-3 days after your first,  second, or third  shots to decrease the possibility of achiness, fever, chills after your COVID-19 novel coronavirus or any current vaccines.  If you can't take Ibuprofen , you can substitute Tylenol 2-325mg tabs every 8 hours or 2-500mg tabs every 12 hours scheduled for 2-3 days after you get your shots instead.     Based on our discussion, I have outlined the following instructions for you:      - You had a Pap smear today to check for any changes in your cervical health.  - If you experience more numbness, tingling, or weakness, especially in your arm, please make an appointment with your spine specialist.  - You use cyclobenzaprine at night to help with severe neck pain, and it seems to be effective.  -  You take rosuvastatin 5 mg every day for your cholesterol . Remember to refill your prescription in September.  - You can choose to use hormone replacement therapy if you want, as your MTHFR mutation does not prevent you from doing so. See below for more information on HRT.   - You notice numbness and tingling in your left arm when you hold certain positions.  - You use cyclobenzaprine when you need it for pain relief.  - Your sertraline dose has been increased to 1.5 tablets daily, which has improved your mood.  - You had a routine checkup today and received your hepatitis B vaccine. It's recommended that you get the shingles vaccine, and a plan was discussed to help manage any side effects. You decided to wait on the pneumonia vaccine.  - You discussed the side effects of the shingles vaccine and ways to help prevent them.  - A sample was taken to check for yeast or bacteria due to your vaginal discharge.    Thank you again for your visit, and we look forward to supporting you in your journey to better health.       Patient Education   Preventive Care Advice   This is general advice given by our system to help you stay healthy. However, your care team may have specific advice just for you. Please talk to your care team about your preventive care needs.  Nutrition  Eat 5 or more servings of fruits and vegetables each day.  Try wheat bread, brown rice and whole grain pasta (instead of white bread, rice, and pasta).  Get enough calcium and vitamin D. Check the label on foods and aim for 100% of the RDA (recommended daily allowance).  Lifestyle  Exercise at least 150 minutes each week  (30 minutes a day, 5 days a week).  Do muscle strengthening activities 2 days a week. These help control your weight and prevent disease.  No smoking.  Wear sunscreen to prevent skin cancer.  Have a dental exam and cleaning every 6 months.  Yearly exams  See your health care team every year to talk about:  Any changes in your  health.  Any medicines your care team has prescribed.  Preventive care, family planning, and ways to prevent chronic diseases.  Shots (vaccines)   HPV shots (up to age 26), if you've never had them before.  Hepatitis B shots (up to age 59), if you've never had them before.  COVID-19 shot: Get this shot when it's due.  Flu shot: Get a flu shot every year.  Tetanus shot: Get a tetanus shot every 10 years.  Pneumococcal, hepatitis A, and RSV shots: Ask your care team if you need these based on your risk.  Shingles shot (for age 50 and up)  General health tests  Diabetes screening:  Starting at age 35, Get screened for diabetes at least every 3 years.  If you are younger than age 35, ask your care team if you should be screened for diabetes.  Cholesterol test: At age 39, start having a cholesterol test every 5 years, or more often if advised.  Bone density scan (DEXA): At age 50, ask your care team if you should have this scan for osteoporosis (brittle bones).  Hepatitis C: Get tested at least once in your life.  STIs (sexually transmitted infections)  Before age 24: Ask your care team if you should be screened for STIs.  After age 24: Get screened for STIs if you're at risk. You are at risk for STIs (including HIV) if:  You are sexually active with more than one person.  You don't use condoms every time.  You or a partner was diagnosed with a sexually transmitted infection.  If you are at risk for HIV, ask about PrEP medicine to prevent HIV.  Get tested for HIV at least once in your life, whether you are at risk for HIV or not.  Cancer screening tests  Cervical cancer screening: If you have a cervix, begin getting regular cervical cancer screening tests starting at age 21.  Breast cancer scan (mammogram): If you've ever had breasts, begin having regular mammograms starting at age 40. This is a scan to check for breast cancer.  Colon cancer screening: It is important to start screening for colon cancer at age 45.  Have  a colonoscopy test every 10 years (or more often if you're at risk) Or, ask your provider about stool tests like a FIT test every year or Cologuard test every 3 years.  To learn more about your testing options, visit:   .  For help making a decision, visit:   https://pito.miquel/jp46364.  Prostate cancer screening test: If you have a prostate, ask your care team if a prostate cancer screening test (PSA) at age 55 is right for you.  Lung cancer screening: If you are a current or former smoker ages 50 to 80, ask your care team if ongoing lung cancer screenings are right for you.  For informational purposes only. Not to replace the advice of your health care provider. Copyright   2023 [employer]. All rights reserved. Clinically reviewed by the [facility]. [employer] 897168 - REV 01/24.  Learning About Stress  What is stress?     Stress is your body's response to a hard situation. Your body can have a physical, emotional, or mental response. Stress is a fact of life for most people, and it affects everyone differently. What causes stress for you may not be stressful for someone else.  A lot of things can cause stress. You may feel stress when you go on a job interview, take a test, or run a race. This kind of short-term stress is normal and even useful. It can help you if you need to work hard or react quickly. For example, stress can help you finish an important job on time.  Long-term stress is caused by ongoing stressful situations or events. Examples of long-term stress include long-term health problems, ongoing problems at work, or conflicts in your family. Long-term stress can harm your health.  How does stress affect your health?  When you are stressed, your body responds as though you are in danger. It makes hormones that speed up your heart, make you breathe faster, and give you a burst of energy. This is called the fight-or-flight stress response. If the stress is over  quickly, your body goes back to normal and no harm is done.  But if stress happens too often or lasts too long, it can have bad effects. Long-term stress can make you more likely to get sick, and it can make symptoms of some diseases worse. If you tense up when you are stressed, you may develop neck, shoulder, or low back pain. Stress is linked to high blood pressure and heart disease.  Stress also harms your emotional health. It can make you jackson, tense, or depressed. Your relationships may suffer, and you may not do well at work or school.  What can you do to manage stress?  You can try these things to help manage stress:   Do something active. Exercise or activity can help reduce stress. Walking is a great way to get started. Even everyday activities such as housecleaning or yard work can help.  Try yoga or idalia chi. These techniques combine exercise and meditation. You may need some training at first to learn them.  Do something you enjoy. For example, listen to music or go to a movie. Practice your hobby or do volunteer work.  Meditate. This can help you relax, because you are not worrying about what happened before or what may happen in the future.  Do guided imagery. Imagine yourself in any setting that helps you feel calm. You can use online videos, books, or a teacher to guide you.  Do breathing exercises. For example:  From a standing position, bend forward from the waist with your knees slightly bent. Let your arms dangle close to the floor.  Breathe in slowly and deeply as you return to a standing position. Roll up slowly and lift your head last.  Hold your breath for just a few seconds in the standing position.  Breathe out slowly and bend forward from the waist.  Let your feelings out. Talk, laugh, cry, and express anger when you need to. Talking with supportive friends or family, a counselor, or a radha leader about your feelings is a healthy way to relieve stress. Avoid discussing your feelings with  "people who make you feel worse.  Write. It may help to write about things that are bothering you. This helps you find out how much stress you feel and what is causing it. When you know this, you can find better ways to cope.  What can you do to prevent stress?  You might try some of these things to help prevent stress:  Manage your time. This helps you find time to do the things you want and need to do.  Get enough sleep. Your body recovers from the stresses of the day while you are sleeping.  Get support. Your family, friends, and community can make a difference in how you experience stress.  Limit your news feed. Avoid or limit time on social media or news that may make you feel stressed.  Do something active. Exercise or activity can help reduce stress. Walking is a great way to get started.  Where can you learn more?  Go to https://www.[employer]/patiented  Enter N032 in the search box to learn more about \"Learning About Stress.\"  Current as of: October 24, 2024  Content Version: 14.5 2024-2025 [employer].   Care instructions adapted under license by your healthcare professional. If you have questions about a medical condition or this instruction, always ask your healthcare professional. [employer] disclaims any warranty or liability for your use of this information.    Learning About Depression Screening  What is depression screening?  Depression screening is a way to see if you have depression symptoms. It may be done by a doctor or counselor. It's often part of a routine checkup. That's because your mental health is just as important as your physical health.  Depression is a mental health condition that affects how you feel, think, and act. You may:  Have less energy.  Lose interest in your daily activities.  Feel sad and grouchy for a long time.  Depression is very common. It affects people of all ages.  Many things can lead to depression. Some people become depressed after " "they have a stroke or find out they have a major illness like cancer or heart disease. The death of a loved one or a breakup may lead to depression. It can run in families. Most experts believe that a combination of inherited genes and stressful life events can cause it.  What happens during screening?  You may be asked to fill out a form about your depression symptoms. You and the doctor will discuss your answers. The doctor may ask you more questions to learn more about how you think, act, and feel.  What happens after screening?  If you have symptoms of depression, your doctor will talk to you about your options.  Doctors usually treat depression with medicines or counseling. Often, combining the two works best. Many people don't get help because they think that they'll get over the depression on their own. But people with depression may not get better unless they get treatment.  The cause of depression is not well understood. There may be many factors involved. But if you have depression, it's not your fault.  A serious symptom of depression is thinking about death or suicide. If you or someone you care about talks about this or about feeling hopeless, get help right away.  It's important to know that depression can be treated. Medicine, counseling, and self-care may help.  Where can you learn more?  Go to https://www.[employer].net/patiented  Enter T185 in the search box to learn more about \"Learning About Depression Screening.\"  Current as of: July 31, 2024  Content Version: 14.5    8943-1103 [email or web address].   Care instructions adapted under license by your healthcare professional. If you have questions about a medical condition or this instruction, always ask your healthcare professional. [email or web address] disclaims any warranty or liability for your use of this information.       "

## 2025-07-15 LAB
BKR AP ASSOCIATED HPV REPORT: NORMAL
BKR LAB AP GYN ADEQUACY: NORMAL
BKR LAB AP GYN INTERPRETATION: NORMAL
BKR LAB AP LMP: NORMAL
BKR LAB AP PREVIOUS ABNL DX: NORMAL
BKR LAB AP PREVIOUS ABNORMAL: NORMAL
PATH REPORT.COMMENTS IMP SPEC: NORMAL
PATH REPORT.COMMENTS IMP SPEC: NORMAL
PATH REPORT.RELEVANT HX SPEC: NORMAL

## 2025-07-16 ENCOUNTER — PATIENT OUTREACH (OUTPATIENT)
Dept: FAMILY MEDICINE | Facility: CLINIC | Age: 55
End: 2025-07-16
Payer: COMMERCIAL

## 2025-07-21 ENCOUNTER — VIRTUAL VISIT (OUTPATIENT)
Dept: ONCOLOGY | Facility: CLINIC | Age: 55
End: 2025-07-21
Attending: FAMILY MEDICINE
Payer: COMMERCIAL

## 2025-07-21 DIAGNOSIS — Z80.0 FAMILY HISTORY OF COLON CANCER: ICD-10-CM

## 2025-07-21 DIAGNOSIS — Z80.3 FAMILY HISTORY OF BREAST CANCER: Primary | ICD-10-CM

## 2025-07-21 DIAGNOSIS — Z80.42 FAMILY HISTORY OF PROSTATE CANCER: ICD-10-CM

## 2025-07-21 DIAGNOSIS — Z80.51 FAMILY HISTORY OF KIDNEY CANCER: ICD-10-CM

## 2025-07-21 DIAGNOSIS — Z80.49 FAMILY HISTORY OF UTERINE CANCER: ICD-10-CM

## 2025-07-21 PROCEDURE — 96041 GENETIC COUNSELING SVC EA 30: CPT | Mod: GT,95 | Performed by: GENETIC COUNSELOR, MS

## 2025-07-21 NOTE — LETTER
"7/21/2025      Sherry Osorio  [address] [place] [address]      Dear Colleague,    Thank you for referring your patient, Sherry Osorio, to the [facility]. Please see a copy of my visit note below.    7/21/2025    Virtual Visit Details  Type of service:  Video Visit   Originating Location (pt. Location): Home  Distant Location (provider location):  Off-site  Platform used for Video Visit: Devshop    Referring Provider: Selena Barnes MD    Presenting Information:   Today Beatris elected for a virtual genetic counseling visit through the Cancer Risk Management Program to discuss her family history of cancer. We reviewed this history, cancer screening recommendations, and available genetic testing options.    Personal History:  Sherry is a 55 year old female. She had a basal cell carcinoma removed from her scalp at age 47 and now meets with a dermatologist monthly. She also had one lipoma removed from each thigh, as well as one angiolipoma from her forehead, in 2012.    Sherry has her ovaries, fallopian tubes and uterus in place, and she has had no ovarian cancer screening to date. She has annual mammograms and her most recent mammogram in April 2025 was normal/negative. Of note, Beatris reports that she had a left-sided breast biopsy in 1995 that identified a benign cyst. Her most recent colonoscopy in February 2016 was normal and follow-up was recommended in 10 years. She does not regularly do any other cancer screening at this time.    Family History: (Please see scanned pedigree for detailed family history information)  Beatris's sister is 59 and was diagnosed with breast cancer at age 47, followed by endometrial cancer (possibly Tamoxifen-related) at age 51. Beatris believes her sister may have pursued genetic testing around 2013, but additional details are unknown.  One maternal aunt was diagnosed with a tumor \"on top of her kidney\" in the 1970's (likely in her 30/40's) and " "experienced a recurrence in her 80's. She then passed away at age 82.  One maternal aunt was diagnosed with cancer \"in her chest cavity and intestines\" in her late 40's and passed away at age 52. Additional details are unknown.  One maternal aunt was diagnosed with colon cancer at age 54 and passed away at age 64.  Beatris's father was diagnosed with non-metastatic prostate cancer at age 68, as well as several basal cell carcinomas, and passed away at age 76.  Her maternal ethnicity is Kisw[place]ili. Her paternal ethnicity is English. There is no known Ashkenazi Restorationist ancestry on either side of her family.    Discussion:  We reviewed the features of sporadic, familial, and hereditary cancers. In looking at Sherry's family history, it is possible that a cancer susceptibility gene is present as Beatris's sister and several maternal relatives have been diagnosed with potentially related cancers in two generations; her sister and several maternal aunts were also diagnosed under age 50 and/or with more than one primary cancer. We also discussed that though her father was diagnosed with a potentially related cancer (prostate), he was diagnosed at a typical age and his other close relatives have had a cancer. This likely reduces, but does not eliminate, the chance for a paternally inherited cancer syndrome.  We discussed the natural history and genetics of hereditary cancer, including hereditary breast and ovarian cancer (HBOC) syndrome.  We reviewed that the most common cause of hereditary breast cancer is HBOC syndrome, which is caused by mutations in the BRCA1 and BRCA2 genes. Individuals with HBOC syndrome are at increased risk for several different cancers, including breast, ovarian, male breast, prostate, melanoma, and pancreatic cancer.  We discussed that there are additional genes that could cause increased risk for the cancers in her family. As many of these genes present with overlapping features in a family and accurate " "cancer risk cannot always be established based upon the pedigree analysis alone, it would be reasonable for Sherry to consider panel genetic testing to analyze multiple genes at once. Moreover, current NCCN guidelines recommend that hereditary cancer genetic testing be done through a multigene panel as: \"An individual s personal and/or family history may be explained by more than one inherited cancer syndrome; thus, phenotype directed testing based on personal and family history through a tailored multigene panel test is often more efficient and cost effective and increases the yield of detecting a pathogenic/likely pathogenic variant in a gene that will impact medical management for the individual or their family members with increased risk.\"  Based on her family history, Sherry meets current National Comprehensive Cancer Network (NCCN) criteria for genetic testing of high penetrance breast cancer genes (I.e. BRCA1, BRCA2, CDH1, PALB2, PTEN, STK11, TP53, etc.) and genes related to colorectal/uterine cancer risk (such as the Ng syndrome genes). Of note, though Beatris's sister may have pursued genetic testing, the testing was likely accomplished more than 10 years ago and no additional details are available. This means genetic testing remains appropriate for Beatris to consider.  A detailed handout regarding these genes/syndromes and the information we discussed was provided to Sherry at the end of our appointment today and can be found in the after visit summary. Topics included: inheritance pattern, cancer risks, cancer screening recommendations, and also risks, benefits and limitations of testing.  We discussed that genetic testing for cancer susceptibility genes is typically most informative, though, when it is first performed on a family member with a personal history of cancer. Testing is available to Sherry, but with limitations. If Sherry pursues testing at this time and receives a negative result, this does " "not rule out the possibility of a hereditary cancer syndrome in her and/or her family. Despite these limitations and given that her sister's testing was completed more than 10 years ago, Sherry expressed interest in proceeding with testing herself. Of note, current NCCN guidelines state that \"While testing an affected family member is most informative, it is also appropriate to test unaffected family members who meet testing criteria.\"  We reviewed Beatris's genetic testing options: [facility] Common Hereditary Cancers Panel and expanded [facility] Multi-Cancer Panel. She opted for the [facility] Common Hereditary Cancers Panel.  Consent was obtained over the video and her blood will be drawn on 7/24/2025. Once her blood is drawn, it will be sent to [facility] Turn around time: 2-3 weeks after [facility] receives her blood sample.  Medical Management: For Sherry, we reviewed that the information from genetic testing may determine:  additional cancer screening for which Sherry may qualify (i.e. mammogram and breast MRI, more frequent colonoscopies, more frequent dermatologic exams, etc.),  options for risk reducing surgeries Sherry could consider (i.e. bilateral mastectomy, surgery to remove her ovaries and/or uterus, etc.),    and targeted chemotherapies if she were to develop certain cancers in the future (i.e. immunotherapy for individuals with Ng syndrome, PARP inhibitors, etc.).   These recommendations and possible targeted chemotherapies will be discussed in detail once genetic testing is completed.     Plan:  1) Today Sherry elected to proceed with genetic testing using the [facility] Common Hereditary Cancers Panel. Her blood will be drawn during her next scheduled lab appointment on 7/24/2025.  2) The results should be available 2-3 weeks after [facility] receives her blood sample.  3) Sherry will be contacted to schedule a virtual return visit with me to discuss the results.    I spent 61 minutes on the date of the " encounter doing chart review, history and exam, documentation and further activities as noted above.    Elle Herrera MS, [facility]  Licensed, Certified Genetic Counselor  Office: [phone]  [email or web address]      Again, thank you for allowing me to participate in the care of your patient.        Sincerely,        Elle Herrera     Electronically signed

## 2025-07-21 NOTE — PROGRESS NOTES
"7/21/2025    Virtual Visit Details  Type of service:  Video Visit   Originating Location (pt. Location): Home  Distant Location (provider location):  Off-site  Platform used for Video Visit: Paul    Referring Provider: Selena Barnes MD    Presenting Information:   Today Beatris elected for a virtual genetic counseling visit through the Cancer Risk Management Program to discuss her family history of cancer. We reviewed this history, cancer screening recommendations, and available genetic testing options.    Personal History:  Sherry is a 55 year old female. She had a basal cell carcinoma removed from her scalp at age 47 and now meets with a dermatologist monthly. She also had one lipoma removed from each thigh, as well as one angiolipoma from her forehead, in 2012.    Sherry has her ovaries, fallopian tubes and uterus in place, and she has had no ovarian cancer screening to date. She has annual mammograms and her most recent mammogram in April 2025 was normal/negative. Of note, Beatris reports that she had a left-sided breast biopsy in 1995 that identified a benign cyst. Her most recent colonoscopy in February 2016 was normal and follow-up was recommended in 10 years. She does not regularly do any other cancer screening at this time.    Family History: (Please see scanned pedigree for detailed family history information)  Beatris's sister is 59 and was diagnosed with breast cancer at age 47, followed by endometrial cancer (possibly Tamoxifen-related) at age 51. Beatris believes her sister may have pursued genetic testing around 2013, but additional details are unknown.  One maternal aunt was diagnosed with a tumor \"on top of her kidney\" in the 1970's (likely in her 30/40's) and experienced a recurrence in her 80's. She then passed away at age 82.  One maternal aunt was diagnosed with cancer \"in her chest cavity and intestines\" in her late 40's and passed away at age 52. Additional details are unknown.  One maternal aunt " was diagnosed with colon cancer at age 54 and passed away at age 64.  Beatris's father was diagnosed with non-metastatic prostate cancer at age 68, as well as several basal cell carcinomas, and passed away at age 76.  Her maternal ethnicity is Persian. Her paternal ethnicity is English. There is no known Ashkenazi Christian ancestry on either side of her family.    Discussion:  We reviewed the features of sporadic, familial, and hereditary cancers. In looking at Sherry's family history, it is possible that a cancer susceptibility gene is present as Beatris's sister and several maternal relatives have been diagnosed with potentially related cancers in two generations; her sister and several maternal aunts were also diagnosed under age 50 and/or with more than one primary cancer. We also discussed that though her father was diagnosed with a potentially related cancer (prostate), he was diagnosed at a typical age and his other close relatives have had a cancer. This likely reduces, but does not eliminate, the chance for a paternally inherited cancer syndrome.  We discussed the natural history and genetics of hereditary cancer, including hereditary breast and ovarian cancer (HBOC) syndrome.  We reviewed that the most common cause of hereditary breast cancer is HBOC syndrome, which is caused by mutations in the BRCA1 and BRCA2 genes. Individuals with HBOC syndrome are at increased risk for several different cancers, including breast, ovarian, male breast, prostate, melanoma, and pancreatic cancer.  We discussed that there are additional genes that could cause increased risk for the cancers in her family. As many of these genes present with overlapping features in a family and accurate cancer risk cannot always be established based upon the pedigree analysis alone, it would be reasonable for Sherry to consider panel genetic testing to analyze multiple genes at once. Moreover, current NCCN guidelines recommend that hereditary cancer  "genetic testing be done through a multigene panel as: \"An individual s personal and/or family history may be explained by more than one inherited cancer syndrome; thus, phenotype directed testing based on personal and family history through a tailored multigene panel test is often more efficient and cost effective and increases the yield of detecting a pathogenic/likely pathogenic variant in a gene that will impact medical management for the individual or their family members with increased risk.\"  Based on her family history, Sherry meets current National Comprehensive Cancer Network (NCCN) criteria for genetic testing of high penetrance breast cancer genes (I.e. BRCA1, BRCA2, CDH1, PALB2, PTEN, STK11, TP53, etc.) and genes related to colorectal/uterine cancer risk (such as the Ng syndrome genes). Of note, though Beatris's sister may have pursued genetic testing, the testing was likely accomplished more than 10 years ago and no additional details are available. This means genetic testing remains appropriate for Beatris to consider.  A detailed handout regarding these genes/syndromes and the information we discussed was provided to Sherry at the end of our appointment today and can be found in the after visit summary. Topics included: inheritance pattern, cancer risks, cancer screening recommendations, and also risks, benefits and limitations of testing.  We discussed that genetic testing for cancer susceptibility genes is typically most informative, though, when it is first performed on a family member with a personal history of cancer. Testing is available to Sherry, but with limitations. If Sherry pursues testing at this time and receives a negative result, this does not rule out the possibility of a hereditary cancer syndrome in her and/or her family. Despite these limitations and given that her sister's testing was completed more than 10 years ago, Sherry expressed interest in proceeding with testing herself. " "Of note, current NCCN guidelines state that \"While testing an affected family member is most informative, it is also appropriate to test unaffected family members who meet testing criteria.\"  We reviewed Beatris's genetic testing options: [facility] Common Hereditary Cancers Panel and expanded [facility] Multi-Cancer Panel. She opted for the [facility] Common Hereditary Cancers Panel.  Consent was obtained over the video and her blood will be drawn on 7/24/2025. Once her blood is drawn, it will be sent to [facility] Turn around time: 2-3 weeks after [facility] receives her blood sample.  Medical Management: For Sherry, we reviewed that the information from genetic testing may determine:  additional cancer screening for which Sherry may qualify (i.e. mammogram and breast MRI, more frequent colonoscopies, more frequent dermatologic exams, etc.),  options for risk reducing surgeries Sherry could consider (i.e. bilateral mastectomy, surgery to remove her ovaries and/or uterus, etc.),    and targeted chemotherapies if she were to develop certain cancers in the future (i.e. immunotherapy for individuals with Ng syndrome, PARP inhibitors, etc.).   These recommendations and possible targeted chemotherapies will be discussed in detail once genetic testing is completed.     Plan:  1) Today Sherry elected to proceed with genetic testing using the [facility] Common Hereditary Cancers Panel. Her blood will be drawn during her next scheduled lab appointment on 7/24/2025.  2) The results should be available 2-3 weeks after [facility] receives her blood sample.  3) Sherry will be contacted to schedule a virtual return visit with me to discuss the results.    I spent 61 minutes on the date of the encounter doing chart review, history and exam, documentation and further activities as noted above.    Elle Herrera MS, [facility] [place]  Licensed, Certified Genetic Counselor  Office: [phone]  [email or web address]  "

## 2025-07-21 NOTE — NURSING NOTE
Current patient location: [id number] [place] [address]      Is the patient currently in the state of [place]? YES    Visit mode:VIDEO    If the visit is dropped, the patient can be reconnected by: VIDEO VISIT: Text to cell phone:   Telephone Information:   Mobile [phone]       Will anyone else be joining the visit? NO  (If patient encounters technical issues they should call [phone]    How would you like to obtain your AVS? MyChart    Are changes needed to the allergy or medication list? N/A    Reason for visit: Consult      Adela KEANE

## 2025-07-21 NOTE — PATIENT INSTRUCTIONS
Assessing Cancer Risk  Only about 5-10% of cancers are thought to be due to an inherited cancer susceptibility gene.    These families often have:  Several people with the same or related types of cancer  Cancers diagnosed at a young age (before age 50)  Individuals with more than one primary cancer  Multiple generations of the family affected with cancer    Genetic Testing  Genetic testing involves a blood or saliva test and will look at the genetic information in select genes for any harmful mutations that are associated with increased cancer risk.  If possible, it is recommended that the person(s) who has had cancer be tested before other family members.  That person will give us the most useful information about whether or not a specific gene is associated with the cancer in the family.     Results  There are three possible results of genetic testing:  Positive--a harmful mutation was identified  Negative--no mutation was identified  Variant of unknown significance--a variation in one of the genes was identified, but it is unclear how this impacts cancer risk in the family    Advantages and Disadvantages  There are advantages and disadvantages to genetic testing.    Advantages  May clarify your cancer risk  Can help you make medical decisions  May explain the cancers in your family  May give useful information to your family members (if you share your results)    Disadvantages  Possible negative emotional impact of learning about inherited cancer risk  Uncertainty in interpreting a negative test result in some situations  Possible genetic discrimination concerns (see below)    Inheritance   Mutations in most cancer risk genes are inherited in an autosomal dominant pattern.  This means that if a parent has a mutation, each of their children will have a 50% chance of inheriting that same mutation.  Therefore, each child would have a 50% chance of being at increased risk for developing cancer.                                               Image obtained from Genetics Home Reference, 2013     Genetic Information Nondiscrimination Act (LILIANA)  LILIANA is a federal law that protects individuals from health insurance or employment discrimination based on a genetic test result alone.  Although rare, there are currently no legal protections in terms of life insurance, long term care, or disability insurances.  Visit the National Human Genome Research [place] at Genome.gov/29860863 to learn more.    Reducing Cancer Risk  If a harmful mutation is found in a cancer risk gene, there may be certain screening tests or preventative surgeries that can be offered. This information will be discussed after genetic testing is completed. If no mutations are found on genetic testing, screening is then recommended based on personal and/or family history of cancer.     Questions to Think About Regarding Genetic Testing  What effect will the test result have on me and my relationship with my family members if I have an inherited gene mutation?  If I don t have a gene mutation?  Should I share my test results, and how will my family react to this news, which may also affect them?  Are my children ready to learn new information that may one day affect their own health?    Resources  American Cancer Society (ACS) cancer.org   National Cancer [place] (NCI) cancer.gov     Please call us if you have any questions or concerns.   Cancer Risk Management Program [phone] ([phone])  Andrés Meza, MS [facility] [place] [phone]  Cindy Washington, MS, [facility] [place] [phone]  Nidhi Stinson, MS, [facility] [place]  [phone]  Arabella Wells, MS, [facility] [place]  [phone]  Essence Lr, MS, [facility] [place]  [phone]  Elle Herrera, MS, [facility] [place] [phone]  Cristina Joaquin, MS, [facility] [place] [phone]

## 2025-07-24 ENCOUNTER — LAB (OUTPATIENT)
Dept: LAB | Facility: CLINIC | Age: 55
End: 2025-07-24
Payer: COMMERCIAL

## 2025-07-24 DIAGNOSIS — Z80.42 FAMILY HISTORY OF PROSTATE CANCER: ICD-10-CM

## 2025-07-24 DIAGNOSIS — F43.23 ADJUSTMENT DISORDER WITH MIXED ANXIETY AND DEPRESSED MOOD: ICD-10-CM

## 2025-07-24 DIAGNOSIS — Z80.51 FAMILY HISTORY OF KIDNEY CANCER: ICD-10-CM

## 2025-07-24 DIAGNOSIS — Z80.49 FAMILY HISTORY OF UTERINE CANCER: ICD-10-CM

## 2025-07-24 DIAGNOSIS — Z80.0 FAMILY HISTORY OF COLON CANCER: ICD-10-CM

## 2025-07-24 DIAGNOSIS — F33.1 MODERATE RECURRENT MAJOR DEPRESSION (H): ICD-10-CM

## 2025-07-24 DIAGNOSIS — Z80.3 FAMILY HISTORY OF BREAST CANCER: ICD-10-CM

## 2025-07-24 DIAGNOSIS — E78.5 HYPERLIPIDEMIA LDL GOAL <130: ICD-10-CM

## 2025-07-24 LAB
ALBUMIN SERPL BCG-MCNC: 4 G/DL (ref 3.5–5.2)
ALP SERPL-CCNC: 114 U/L (ref 40–150)
ALT SERPL W P-5'-P-CCNC: 20 U/L (ref 0–50)
ANION GAP SERPL CALCULATED.3IONS-SCNC: 10 MMOL/L (ref 7–15)
AST SERPL W P-5'-P-CCNC: 27 U/L (ref 0–45)
BILIRUB SERPL-MCNC: 0.3 MG/DL
BUN SERPL-MCNC: 13 MG/DL (ref 6–20)
CALCIUM SERPL-MCNC: 9.3 MG/DL (ref 8.8–10.4)
CHLORIDE SERPL-SCNC: 104 MMOL/L (ref 98–107)
CHOLEST SERPL-MCNC: 256 MG/DL
CREAT SERPL-MCNC: 0.78 MG/DL (ref 0.51–0.95)
CREAT UR-MCNC: 219 MG/DL
EGFRCR SERPLBLD CKD-EPI 2021: 89 ML/MIN/1.73M2
ERYTHROCYTE [DISTWIDTH] IN BLOOD BY AUTOMATED COUNT: 13 % (ref 10–15)
FASTING STATUS PATIENT QL REPORTED: YES
FASTING STATUS PATIENT QL REPORTED: YES
GLUCOSE SERPL-MCNC: 92 MG/DL (ref 70–99)
HCO3 SERPL-SCNC: 25 MMOL/L (ref 22–29)
HCT VFR BLD AUTO: 40 % (ref 35–47)
HDLC SERPL-MCNC: 59 MG/DL
HGB BLD-MCNC: 13.3 G/DL (ref 11.7–15.7)
LAB ORDER RESULT STATUS: NORMAL
LDLC SERPL CALC-MCNC: 184 MG/DL
MCH RBC QN AUTO: 29.2 PG (ref 26.5–33)
MCHC RBC AUTO-ENTMCNC: 33.3 G/DL (ref 31.5–36.5)
MCV RBC AUTO: 88 FL (ref 78–100)
MICROALBUMIN UR-MCNC: <12 MG/L
MICROALBUMIN/CREAT UR: NORMAL MG/G{CREAT}
NONHDLC SERPL-MCNC: 197 MG/DL
PERFORMING LABORATORY: NORMAL
PLATELET # BLD AUTO: 296 10E3/UL (ref 150–450)
POTASSIUM SERPL-SCNC: 4.2 MMOL/L (ref 3.4–5.3)
PROT SERPL-MCNC: 6.6 G/DL (ref 6.4–8.3)
RBC # BLD AUTO: 4.55 10E6/UL (ref 3.8–5.2)
SODIUM SERPL-SCNC: 139 MMOL/L (ref 135–145)
TEST NAME: NORMAL
TRIGL SERPL-MCNC: 67 MG/DL
TSH SERPL DL<=0.005 MIU/L-ACNC: 1.82 UIU/ML (ref 0.3–4.2)
WBC # BLD AUTO: 4.7 10E3/UL (ref 4–11)

## 2025-07-30 ENCOUNTER — VIRTUAL VISIT (OUTPATIENT)
Dept: FAMILY MEDICINE | Facility: CLINIC | Age: 55
End: 2025-07-30
Payer: COMMERCIAL

## 2025-07-30 DIAGNOSIS — N95.1 SYMPTOMATIC MENOPAUSAL OR FEMALE CLIMACTERIC STATES: Primary | ICD-10-CM

## 2025-07-30 DIAGNOSIS — E78.5 HYPERLIPIDEMIA LDL GOAL <130: ICD-10-CM

## 2025-07-30 PROCEDURE — 3050F LDL-C >= 130 MG/DL: CPT | Mod: 95 | Performed by: FAMILY MEDICINE

## 2025-07-30 PROCEDURE — 98006 SYNCH AUDIO-VIDEO EST MOD 30: CPT | Performed by: FAMILY MEDICINE

## 2025-07-30 RX ORDER — PROGESTERONE 100 MG/1
100 CAPSULE ORAL DAILY
Qty: 90 CAPSULE | Refills: 3 | Status: SHIPPED | OUTPATIENT
Start: 2025-07-30

## 2025-07-30 RX ORDER — ESTRADIOL 0.05 MG/D
1 PATCH, EXTENDED RELEASE TRANSDERMAL
Qty: 8 PATCH | Refills: 11 | Status: SHIPPED | OUTPATIENT
Start: 2025-07-31

## 2025-07-30 NOTE — PROGRESS NOTES
"Beatris is a 55 year old who is being evaluated via a billable video visit.    How would you like to obtain your AVS? MyChart  If the video visit is dropped, the invitation should be resent by: Text to cell phone: [phone]  Will anyone else be joining your video visit? No      Assessment & Plan       ICD-10-CM    1. Symptomatic menopausal or female climacteric states- had premature menopause at age 42 in 2012  N95.1 progesterone (PROMETRIUM) 100 MG capsule     estradiol (VIVELLE-DOT) 0.05 MG/24HR bi-weekly patch      2. Hyperlipidemia LDL goal <130- last LDL was 184 on 7/24/2025 - pt off rosuvastatin x >3 mos -will restart rosuvastatin 5mg and recheck fasting lipids & ALT/AST in 10/30/2025  E78.5 Lipid panel reflex to direct LDL Fasting     ALT     AST            Follow-up : Return in about 3 months (around 10/30/2025) for fasting cholesterol, as lab only appt, then recheck around 7/11/2026 for Preventative Visit w/ Dr. SALVADOR     Assessment & Plan  Symptomatic menopause:  - Start hormone replacement therapy with Vivelle dot patch (0.0375 mg twice weekly) and micronized progesterone (100 mg at night). The patch is small and used on the lower abdomen, and progesterone may help with sleep. Risks include a small chance of blood clots (higher with oral estrogen). Hormone therapy does not cause breast cancer but can make it worse if it happens. She should have a yearly breast exam and mammogram while on hormone therapy. Start with one box of patches and adjust as needed.    Hyperlipidemia, LDL goal <130:  - restart  rosuvastatin 5 mg. Her LDL was 184 on 7/24/2025.  Recheck fasting lipids and liver functions in 3 months, around the end of October.    The longitudinal plan of care for the diagnosis(es)/condition(s) as documented were addressed during this visit. Due to the added complexity in care, I will continue to support Beatris in the subsequent management and with ongoing continuity of care.    \"Consent was obtained from the " "patient to use an AI scribe/documentation tool in the creation of this note.This note/dictation was performed and completed with the assistance of voice recognition software and an AI scribe. It may contain inadvertant transcription  errors,  omissions and/or  inadvertent word substitution.\" --Selena Barnes MD             Aretha Spear is a 55 year old, presenting for the following health issues:  Video Visit  and the following other medical problems:      1. Symptomatic menopausal or female climacteric states- had premature menopause at age 42 in 2012    2. Hyperlipidemia LDL goal <130- last LDL was 184 on 7/24/2025 - pt off rosuvastatin x >3 mos -will restart rosuvastatin 5mg and recheck fasting lipids & ALT/AST in 10/30/2025            7/30/2025    11:20 AM   Additional Questions   Roomed by JANIE KRAMER CMA   Accompanied by SELF     History of Present Illness       Reason for visit:  Estrogen therapy    She eats 2-3 servings of fruits and vegetables daily.She consumes 0 sweetened beverage(s) daily.She exercises with enough effort to increase her heart rate 30 to 60 minutes per day.  She exercises with enough effort to increase her heart rate 6 days per week. She is missing 3 dose(s) of medications per week.  She is not taking prescribed medications regularly due to remembering to take.      HRT discussed at  on 7/10/25 - hot flashes , issues sleeping and staying asleep, no new Sx   History of Present Illness-  Menopausal symptoms  - She has been in menopause since 2012 at age 42, with no periods since then.  - She has night sweats and sometimes has hot flashes, especially after a shower.  - She has not used hormone therapy since menopause.  - She and I discussed starting hormone replacement therapy. We talked about the Vivelle dot patch (estrogen) and micronized progesterone. I explained that the patch is small, used twice a week, and usually not irritating. Progesterone is taken at night and can help " with sleepiness for some women. We discussed that hormone doses for menopause are much lower than birth control. I explained the risks, including a  risk of blood clots (higher with oral estrogen), risk of a cardiovascular event or stroke in the first year of HRT when starting on HRT 5 or more years after last period, and that hormone therapy does not cause breast cancer but can make it worse if it occurs while on HRT,  if it is an estrogen and/or progesterone receptor positive breast cancer.   Pt voiced understanding of these risks     Hyperlipidemia:   - Her cholesterol was high on recent labs, with LDL at 184 mg/dL on July 24, 2025.  - She was not taking rosuvastatin 5 mg at the time of the test., she had stopped it > 3 months prior to her fasting lipids test not for any specific reason, just to see if she could keep her LDL low with diet and exercise.    - She is comfortable restarting rosuvastatin 5mg nightly  and recheck fasting lipids and liver functions in 3 months. She hasn't filled her rx for same from 7/10/2025 from her px.     Recent Labs   Lab Test 07/24/25  0822 12/09/24  0756   CHOL 256* 151   HDL 59 55   * 82   TRIG 67 71        Tobacco use  - She does not vape or smoke and never has.     Breast cancer screening  - She had a mammogram in April 2025 which was normal.    Misc  - She does not have a Mirena IUD or other intrauterine device.  - She still has her uterus.    Patient Active Problem List   Diagnosis    Multiple lipomas    Sebaceous cyst- left upper forehead     Syncope and collapse    Raynaud's phenomenon - noted 10/17/2011 at first visit     Cystocele with uterine descensus- mild w/ both     Cervical pain    Family history of clotting disorder- sister with MTHFR    Dermatitis- every winter - bilateral flanks and hips    MTHFR mutation - heterozygote    Headache    Chronic neck pain    Mild dysplasia of cervix    DDD (degenerative disc disease), cervical    DDD (degenerative disc  disease), thoracic    Cervical spinal stenosis    Chronic constipation    Acute left-sided low back pain without sciatica    Asymptomatic postmenopausal status -early menopause-  since 4/2012 - some mood swings, but scant hot flashes , no night sweats     Dry eyes- sometimes increased watering and sometimes a bit painful in the am's    Hyperlipidemia LDL goal <130- last LDL was 184 on 7/24/2025 - pt off rosuvastatin x >3 mos -will restart rosuvastatin 5mg and recheck fasting lipids & ALT/AST in 10/30/2025    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    Trochanteric bursitis of both hips- left >> right     Adjustment disorder with mixed anxiety and depressed mood- escitalopram 5mg daily = really blunted mood, sertraline 25mg helping- desires to increase to 50mg - noted 7/9/2024    Cervical radiculopathy -Cervical spinal stenosis- with some left cervical radiculopathy - worse lying down and turning head to the left with a snapping feeling    Insomnia secondary to depression with anxiety    Seasonal affective disorder    Moderate recurrent major depression (H)       Current Outpatient Medications   Medication Sig Dispense Refill    aspirin 81 MG tablet Take 1 tablet (81 mg) by mouth daily 30 tablet     calcium carbonate-vitamin D (OSCAL) 250-3.125 MG-MCG TABS per tablet Take 1 tablet by mouth 2 times daily      childrens multivitamin w/iron (FLINTSTONES COMPLETE) chewable tablet Take 2 tablets by mouth daily. 180 tablet 3    cyclobenzaprine (FLEXERIL) 5 MG tablet Take 1-2 tablets (5-10 mg) by mouth 3 times daily as needed for muscle spasms. 30 tablet 5    [START ON 7/31/2025] estradiol (VIVELLE-DOT) 0.05 MG/24HR bi-weekly patch Place 1 patch onto the skin twice a week 8 patch 11    progesterone (PROMETRIUM) 100 MG capsule Take 1 capsule (100 mg) by mouth daily. 90 capsule 3    rosuvastatin (CRESTOR) 5 MG tablet Take 1 tablet (5 mg) by mouth daily. 90 tablet 3    sertraline (ZOLOFT) 100 MG  tablet Take 1.5 tablets (150 mg) by mouth daily. 135 tablet 1    triamcinolone (KENALOG) 0.1 % cream Apply topically 3 times daily as needed Apply sparingly to affected area. 80 g 5        No Known Allergies             Review of Systems  Constitutional, HEENT, cardiovascular, pulmonary, GI, , musculoskeletal, neuro, skin, endocrine and psych systems are negative, except as otherwise noted.      Objective           Vitals:  No vitals were obtained today due to virtual visit.    Physical Exam   GENERAL: alert and no distress  EYES: Eyes grossly normal to inspection.  No discharge or erythema, or obvious scleral/conjunctival abnormalities.  RESP: No audible wheeze, cough, or visible cyanosis.    SKIN: Visible skin clear. No significant rash, abnormal pigmentation or lesions.  NEURO: Cranial nerves grossly intact.  Mentation and speech appropriate for age.  PSYCH: Appropriate affect, tone, and pace of words    Lab on 07/24/2025   Component Date Value Ref Range Status    TSH 07/24/2025 1.82  0.30 - 4.20 uIU/mL Final    Cholesterol 07/24/2025 256 (H)  <200 mg/dL Final    Triglycerides 07/24/2025 67  <150 mg/dL Final    Direct Measure HDL 07/24/2025 59  >=50 mg/dL Final    LDL Cholesterol Calculated 07/24/2025 184 (H)  <100 mg/dL Final    LDL calculated using the Friedewald equation.    Non HDL Cholesterol 07/24/2025 197 (H)  <130 mg/dL Final    Patient Fasting > 8hrs? 07/24/2025 Yes   Final    Sodium 07/24/2025 139  135 - 145 mmol/L Final    Potassium 07/24/2025 4.2  3.4 - 5.3 mmol/L Final    Carbon Dioxide (CO2) 07/24/2025 25  22 - 29 mmol/L Final    Anion Gap 07/24/2025 10  7 - 15 mmol/L Final    Urea Nitrogen 07/24/2025 13.0  6.0 - 20.0 mg/dL Final    Creatinine 07/24/2025 0.78  0.51 - 0.95 mg/dL Final    GFR Estimate 07/24/2025 89  >60 mL/min/1.73m2 Final    eGFR calculated using 2021 CKD-EPI equation.    Calcium 07/24/2025 9.3  8.8 - 10.4 mg/dL Final    Chloride 07/24/2025 104  98 - 107 mmol/L Final    Glucose  07/24/2025 92  70 - 99 mg/dL Final    Alkaline Phosphatase 07/24/2025 114  40 - 150 U/L Final    AST 07/24/2025 27  0 - 45 U/L Final    ALT 07/24/2025 20  0 - 50 U/L Final    Protein Total 07/24/2025 6.6  6.4 - 8.3 g/dL Final    Albumin 07/24/2025 4.0  3.5 - 5.2 g/dL Final    Bilirubin Total 07/24/2025 0.3  <=1.2 mg/dL Final    Patient Fasting > 8hrs? 07/24/2025 Yes   Final    WBC Count 07/24/2025 4.7  4.0 - 11.0 10e3/uL Final    RBC Count 07/24/2025 4.55  3.80 - 5.20 10e6/uL Final    Hemoglobin 07/24/2025 13.3  11.7 - 15.7 g/dL Final    Hematocrit 07/24/2025 40.0  35.0 - 47.0 % Final    MCV 07/24/2025 88  78 - 100 fL Final    MCH 07/24/2025 29.2  26.5 - 33.0 pg Final    MCHC 07/24/2025 33.3  31.5 - 36.5 g/dL Final    RDW 07/24/2025 13.0  10.0 - 15.0 % Final    Platelet Count 07/24/2025 296  150 - 450 10e3/uL Final    Creatinine Urine mg/dL 07/24/2025 219.0  mg/dL Final    The reference ranges have not been established in urine creatinine. The results should be integrated into the clinical context for interpretation.    Albumin Urine mg/L 07/24/2025 <12.0  mg/L Final    The reference ranges have not been established in urine albumin. The results should be integrated into the clinical context for interpretation.    Albumin Urine mg/g Cr 07/24/2025    Final    Unable to calculate, urine albumin and/or urine creatinine is outside detectable limits.  Microalbuminuria is defined as an albumin:creatinine ratio of 17 to 299 for males and 25 to 299 for females. A ratio of albumin:creatinine of 300 or higher is indicative of overt proteinuria.  Due to biologic variability, positive results should be confirmed by a second, first-morning random or 24-hour timed urine specimen. If there is discrepancy, a third specimen is recommended. When 2 out of 3 results are in the microalbuminuria range, this is evidence for incipient nephropathy and warrants increased efforts at glucose control, blood pressure control, and institution of  therapy with an angiotensin-converting-enzyme (ACE) inhibitor (if the patient can tolerate it).      Specimen Status 07/24/2025 Specimen received. Reordered and sent to performing laboratory. Report to follow upon completion.   Final    Performing Laboratory 07/24/2025 [facility] - SEE COMMENT   Final    Test Name 07/24/2025 [facility] Common Hereditary Cancers Panel, genetic test   Final       Discussed all the above with pt in detail today during this video visit.       Video-Visit Details    Type of service:  Video Visit   Joined the call at 7/30/2025, 12:00:52 pm.  Left the call at 7/30/2025, 12:16:33 pm.  You were on the call for 15 minutes 41 seconds.  Originating Location (pt. Location): Home    Distant Location (provider location):  On-site  Platform used for Video Visit: [employer]    25 minutes spent by me on the date of the encounter doing chart review, history and exam, documentation and further activities per the note.  16 minutes was face to face time.     Signed Electronically by: Selena Barnes MD

## 2025-07-30 NOTE — LETTER
My Depression Action Plan  Name: Sherry Osorio   Date of Birth 1970  Date: 7/30/2025    My doctor: Selena Barnes   My clinic: [facility] [address] [id number] [phone]            GREEN    ZONE   Good Control    What it looks like:   Things are going generally well. You have normal ups and downs. You may even feel depressed from time to time, but bad moods usually last less than a day.   What you need to do:  Continue to care for yourself (see self care plan)  Check your depression survival kit and update it as needed  Follow your physician s recommendations including any medication.  Do not stop taking medication unless you consult with your physician first.             YELLOW         ZONE Getting Worse    What it looks like:   Depression is starting to interfere with your life.   It may be hard to get out of bed; you may be starting to isolate yourself from others.  Symptoms of depression are starting to last most all day and this has happened for several days.   You may have suicidal thoughts but they are not constant.   What you need to do:     Call your care team. Your response to treatment will improve if you keep your care team informed of your progress. Yellow periods are signs an adjustment may need to be made.     Continue your self-care.  Just get dressed and ready for the day.  Don't give yourself time to talk yourself out of it.    Talk to someone in your support network.    Open up your Depression Self-Care Plan/Wellness Kit.             RED    ZONE Medical Alert - Get Help    What it looks like:   Depression is seriously interfering with your life.   You may experience these or other symptoms: You can t get out of bed most days, can t work or engage in other necessary activities, you have trouble taking care of basic hygiene, or basic responsibilities, thoughts of suicide or death that will not go away,  self-injurious behavior.     What you need to do:  Call your care team and request a same-day appointment. If they are not available (weekends or after hours) call your local crisis line, emergency room or 911.          Depression Self-Care Plan / Wellness Kit    Many people find that medication and therapy are helpful treatments for managing depression. In addition, making small changes to your everyday life can help to boost your mood and improve your wellbeing. Below are some tips for you to consider. Be sure to talk with your medical provider and/or behavioral health consultant if your symptoms are worsening or not improving.     Sleep   Sleep hygiene  means all of the habits that support good, restful sleep. It includes maintaining a consistent bedtime and wake time, using your bedroom only for sleeping or sex, and keeping the bedroom dark and free of distractions like a computer, smartphone, or television.     Develop a Healthy Routine  Maintain good hygiene. Get out of bed in the morning, make your bed, brush your teeth, take a shower, and get dressed. Don t spend too much time viewing media that makes you feel stressed. Find time to relax each day.    Exercise  Get some form of exercise every day. This will help reduce pain and release endorphins, the  feel good  chemicals in your brain. It can be as simple as just going for a walk or doing some gardening, anything that will get you moving.      Diet  Strive to eat healthy foods, including fruits and vegetables. Drink plenty of water. Avoid excessive sugar, caffeine, alcohol, and other mood-altering substances.     Stay Connected with Others  Stay in touch with friends and family members.    Manage Your Mood  Try deep breathing, massage therapy, biofeedback, or meditation. Take part in fun activities when you can. Try to find something to smile about each day.     Psychotherapy  Be open to working with a therapist if your provider recommends it.      Medication  Be sure to take your medication as prescribed. Most anti-depressants need to be taken every day. It usually takes several weeks for medications to work. Not all medicines work for all people. It is important to follow-up with your provider to make sure you have a treatment plan that is working for you. Do not stop your medication abruptly without first discussing it with your provider.    Crisis Resources   These hotlines are for both adults and children. They and are open 24 hours a day, 7 days a week unless noted otherwise.    National Suicide Prevention Lifeline   988 or 2-202-459-WDIB (4279)    Crisis Text Line    www.crisistextline.org  Text HOME to 848786 from anywhere in the [place], anytime, about any type of crisis. A live, trained crisis counselor will receive the text and respond quickly.    [facility] Lifeline for LGBTQ Youth  A national crisis intervention and suicide lifeline for LGBTQ youth under 25. Provides a safe place to talk without judgement. Call 1-322.289.6916; text START to 618635 or visit www.thetrevorproject.org to talk to a trained counselor.    For [facility] crisis numbers, visit the [facility] website at:  https://[place].gov/dhs/people-we-serve/adults/health-care/mental-health/resources/crisis-contacts.jsp

## 2025-07-30 NOTE — PATIENT INSTRUCTIONS
" [facility]  [address] [place] [address]  Office: [phone]   Fax:    [phone]     Return in about 3 months (around 10/30/2025) for fasting cholesterol, as lab only appt, then recheck around 7/11/2026 for annual Preventative Visit w/ Dr. DRUMMOND.     Based on our discussion, I have outlined the following instructions for you:      - Begin using the Vivelle dot patch for hormone therapy. Apply the patch to your lower abdomen twice a week. Also, take micronized progesterone (100 mg) every night, which may help you sleep better.  - Make sure to have a breast exam and mammogram every year while you are on hormone therapy.  - Start with one box of patches and we can  adjust the dosage , if your symptoms are not well controlled , as needed.  - restart your  rosuvastatin 5 mg nightly .   - Plan to have your fasting lipids and liver functions checked in about 3 months, around the end of October.    Thank you again for your visit, and we look forward to supporting you in your journey to better health.               Thank you so much for doing a video visit with us today. And, again, thank you  for choosing our [facility].  Please contact us with any questions that you may have.   We appreciate the opportunity to serve you now and look forward to supporting your healthcare needs for a long time to come!    Our clinic for the foreseeable future and until further notice , will continue to offer virtual visits, including telephone visits, video visits,  and e-visits, in addition to in person visits.  Please call to schedule another one if you need it!        Most Sincerely,     Selena Barnes MD                                              To reach your [facility] care team after hours call:   [phone] press #2 \"to speak with your care team\".  This will get you to our clinic instead of routing to central [facility]  " scheduling.         Our current clinic hours are:         Monday- Thursday   7:00am - 6:00pm  in person.      Friday  7:00am- 5:00pm in person                       Saturday and Sunday : Closed to in person and virtual visits            We have telephone and virtual visit times available in the above time frames Monday through Friday.                                                Phone:  [phone] ( press 2 to speak to your care team).     Our pharmacy hours: Monday  through Friday 8:00am to 5:00pm                        Saturday - 9:00 am to 12 noon         Sunday : Closed.     ###  Please note: at this time we are not accepting any walk-in visits. ###      There is also information available at our web site:  [email or web address]    If your provider ordered any lab tests and you do not receive the results within 10 business days, please call the clinic.    If you need a medication refill please contact your pharmacy.  Please allow 3 business days for your refill to be completed.    Our clinic offers telephone visits and e visits.  Please ask one of your team members to explain more.      Use [employer] (secure email communication and access to your chart) to send your primary care provider a message or make an appointment. Ask someone on your Team how to sign up for [employer].

## 2025-08-05 DIAGNOSIS — Z80.49 FAMILY HISTORY OF UTERINE CANCER: ICD-10-CM

## 2025-08-05 DIAGNOSIS — Z80.51 FAMILY HISTORY OF KIDNEY CANCER: ICD-10-CM

## 2025-08-05 DIAGNOSIS — Z80.0 FAMILY HISTORY OF COLON CANCER: ICD-10-CM

## 2025-08-05 DIAGNOSIS — Z80.3 FAMILY HISTORY OF BREAST CANCER: Primary | ICD-10-CM

## 2025-08-05 DIAGNOSIS — Z80.42 FAMILY HISTORY OF PROSTATE CANCER: ICD-10-CM

## 2025-08-05 LAB
SCANNED LAB RESULT: NORMAL
TEST NAME: NORMAL

## 2025-08-27 ENCOUNTER — HOSPITAL ENCOUNTER (OUTPATIENT)
Dept: MRI IMAGING | Facility: CLINIC | Age: 55
Discharge: HOME | End: 2025-08-27
Attending: NURSE PRACTITIONER
Payer: COMMERCIAL

## 2025-08-27 DIAGNOSIS — R26.89 IMBALANCE: ICD-10-CM

## 2025-08-27 DIAGNOSIS — M54.12 CERVICAL RADICULOPATHY: ICD-10-CM

## 2025-08-27 PROCEDURE — 72141 MRI NECK SPINE W/O DYE: CPT
